# Patient Record
Sex: MALE | Race: WHITE | NOT HISPANIC OR LATINO | Employment: OTHER | URBAN - METROPOLITAN AREA
[De-identification: names, ages, dates, MRNs, and addresses within clinical notes are randomized per-mention and may not be internally consistent; named-entity substitution may affect disease eponyms.]

---

## 2023-02-15 ENCOUNTER — OFFICE VISIT (OUTPATIENT)
Dept: MEDICAL GROUP | Facility: MEDICAL CENTER | Age: 73
End: 2023-02-15
Payer: MEDICARE

## 2023-02-15 VITALS
HEART RATE: 94 BPM | WEIGHT: 179.8 LBS | TEMPERATURE: 98 F | OXYGEN SATURATION: 95 % | DIASTOLIC BLOOD PRESSURE: 82 MMHG | BODY MASS INDEX: 28.22 KG/M2 | HEIGHT: 67 IN | SYSTOLIC BLOOD PRESSURE: 136 MMHG

## 2023-02-15 DIAGNOSIS — M54.50 ACUTE BILATERAL LOW BACK PAIN WITHOUT SCIATICA: Primary | ICD-10-CM

## 2023-02-15 DIAGNOSIS — N40.0 BPH WITH ELEVATED PSA: ICD-10-CM

## 2023-02-15 DIAGNOSIS — Z02.89 ENCOUNTER FOR COMPLETION OF FORM WITH PATIENT: ICD-10-CM

## 2023-02-15 DIAGNOSIS — H93.13 TINNITUS, BILATERAL: ICD-10-CM

## 2023-02-15 DIAGNOSIS — R97.20 BPH WITH ELEVATED PSA: ICD-10-CM

## 2023-02-15 PROCEDURE — 99204 OFFICE O/P NEW MOD 45 MIN: CPT | Performed by: FAMILY MEDICINE

## 2023-02-15 RX ORDER — TIZANIDINE 4 MG/1
4 TABLET ORAL EVERY 6 HOURS PRN
Qty: 30 TABLET | Refills: 1 | Status: SHIPPED | OUTPATIENT
Start: 2023-02-15 | End: 2023-08-23

## 2023-02-15 ASSESSMENT — PATIENT HEALTH QUESTIONNAIRE - PHQ9: CLINICAL INTERPRETATION OF PHQ2 SCORE: 0

## 2023-02-15 NOTE — PROGRESS NOTES
"Subjective:     CC:  The primary encounter diagnosis was Acute bilateral low back pain without sciatica. Diagnoses of BPH with elevated PSA, Tinnitus, bilateral, and Encounter for completion of form with patient were also pertinent to this visit.    HISTORY OF THE PRESENT ILLNESS: Patient is a 73 y.o. male. This pleasant patient is here today to establish care and discuss back pain. Patient presents with daughter who helps with some of the medical history.    Moved here from [facility], [place], [facility]   Moved closer to family to [place]    Back pain  Bad fall 2 weeks ago, landed flat on back  Bad pain  Fell out of bed, had an air mattress on his bed, he was waiting for a new mattress  No head contact, no LOC  Middle of low back hurts, needing to use cane which is not typical for him  No radiation down legs  No loss of bladder or stool control  Using rest and heat    BPH with elevated PSA  Has had work up with urology and has always been benign  Does not take any medication for prostate, uses Zinc plus  Urinates once per night  Able to empty bladder  No issues with starting the stream  No hematuria    Tinnitus, B/L  Does wear hearing aids  Hasn't seen audiologist in some time  Would consider getting an updated test      Problem   Bph With Elevated Psa   Tinnitus, Bilateral       Current Outpatient Medications Ordered in Epic   Medication Sig Dispense Refill    tizanidine (ZANAFLEX) 4 MG Tab Take 1 Tablet by mouth every 6 hours as needed (low back pain). 30 Tablet 1     No current Epic-ordered facility-administered medications on file.       Health Maintenance: records request pending    ROS:   ROS see HPI      Objective:     Exam: /82   Pulse 94   Temp 36.7 °C (98 °F) (Temporal)   Ht 1.702 m (5' 7\")   Wt 81.6 kg (179 lb 12.8 oz)   SpO2 95%  Body mass index is 28.16 kg/m².    Physical Exam  Vitals reviewed.   Constitutional:       General: He is not in acute distress.     Appearance: " Normal appearance.   HENT:      Head: Normocephalic and atraumatic.   Eyes:      Conjunctiva/sclera: Conjunctivae normal.   Cardiovascular:      Rate and Rhythm: Normal rate and regular rhythm.      Heart sounds: Normal heart sounds.   Pulmonary:      Effort: Pulmonary effort is normal. No respiratory distress.      Breath sounds: Normal breath sounds.   Abdominal:      General: There is no distension.      Palpations: Abdomen is soft.      Tenderness: There is no abdominal tenderness. There is no guarding.   Musculoskeletal:         General: No swelling, tenderness, deformity or signs of injury.      Comments: Negative seated straight leg raise   Skin:     General: Skin is warm and dry.   Neurological:      Mental Status: He is alert. Mental status is at baseline.      Gait: Gait abnormal (uses cane).   Psychiatric:         Mood and Affect: Mood normal.         Behavior: Behavior normal.         Assessment & Plan:   73 y.o. male with the following -    Problem List Items Addressed This Visit       BPH with elevated PSA     Has been followed by urology closely  Records request pending  Will discuss referral to establish here if needed but not having any concerning symptoms at this time         Tinnitus, bilateral     Chronic and seems to have worsened  Patient is amenable to updated audiology visit         Relevant Orders    Referral to Audiology     Other Visit Diagnoses       Acute bilateral low back pain without sciatica    -  Primary  Acute problem  No red flag symptoms, no midline tenderness or step offs noted  Will continue with conservative therapy and will add tizanidine, discussed with patient can increase fall risk and drowsiness, if not improving over next couple of weeks to come back and will investigate further    Relevant Medications    tizanidine (ZANAFLEX) 4 MG Tab    Encounter for completion of form with patient      DMV form filled out for moderately temporary plaquered            Return if  symptoms worsen or fail to improve.    Please note that this dictation was created using voice recognition software. I have made every reasonable attempt to correct obvious errors, but I expect that there are errors of grammar and possibly content that I did not discover before finalizing the note.

## 2023-02-15 NOTE — LETTER
Jemstep  Armaan Koch D.O.  [address]  [place] [address]  Fax: [phone]   Authorization for Release/Disclosure of   Protected Health Information   Name: RAMESH BLANK : 1950 SSN: [id number]   Address: [place] [address] Phone:    [phone] (home)    I authorize the entity listed below to release/disclose the PHI below to:   Jemstep/Armaan Koch D.O. and Armaan Koch D.O.   Provider or Entity Name:  [facility]   Address   City, State, [place], [place] Phone:      Fax:     Reason for request: continuity of care   Information to be released:    [ xxx ] LAST COLONOSCOPY,  including any PATH REPORT and follow-up  [  ] LAST FIT/COLOGUARD RESULT [  ] LAST DEXA  [  ] LAST MAMMOGRAM  [  ] LAST PAP  [xxx  ] LAST LABS [  ] RETINA EXAM REPORT  [ xxx ] IMMUNIZATION RECORDS  [ xxx ] Release all info      [  ] Check here and initial the line next to each item to release ALL health information INCLUDING  _____ Care and treatment for drug and / or alcohol abuse  _____ HIV testing, infection status, or AIDS  _____ Genetic Testing    DATES OF SERVICE OR TIME PERIOD TO BE DISCLOSED: _____________  I understand and acknowledge that:  * This Authorization may be revoked at any time by you in writing, except if your health information has already been used or disclosed.  * Your health information that will be used or disclosed as a result of you signing this authorization could be re-disclosed by the recipient. If this occurs, your re-disclosed health information may no longer be protected by State or Federal laws.  * You may refuse to sign this Authorization. Your refusal will not affect your ability to obtain treatment.  * This Authorization becomes effective upon signing and will  on (date) __________.      If no date is indicated, this Authorization will  one (1) year from the signature date.    Name: Ramesh Blank    Signature:   Date:      2/15/2023       PLEASE FAX REQUESTED RECORDS BACK TO: [phone]

## 2023-02-16 NOTE — ASSESSMENT & PLAN NOTE
Has been followed by urology closely  Records request pending  Will discuss referral to establish here if needed but not having any concerning symptoms at this time

## 2023-06-23 ENCOUNTER — TELEPHONE (OUTPATIENT)
Dept: HEALTH INFORMATION MANAGEMENT | Facility: OTHER | Age: 73
End: 2023-06-23
Payer: MEDICARE

## 2023-08-22 ENCOUNTER — APPOINTMENT (OUTPATIENT)
Dept: RADIOLOGY | Facility: MEDICAL CENTER | Age: 73
DRG: 543 | End: 2023-08-22
Attending: STUDENT IN AN ORGANIZED HEALTH CARE EDUCATION/TRAINING PROGRAM
Payer: MEDICARE

## 2023-08-22 ENCOUNTER — HOSPITAL ENCOUNTER (INPATIENT)
Facility: MEDICAL CENTER | Age: 73
LOS: 3 days | DRG: 543 | End: 2023-08-26
Attending: STUDENT IN AN ORGANIZED HEALTH CARE EDUCATION/TRAINING PROGRAM | Admitting: SURGERY
Payer: MEDICARE

## 2023-08-22 DIAGNOSIS — Z02.9 DISCHARGE PLANNING ISSUES: ICD-10-CM

## 2023-08-22 DIAGNOSIS — S32.402A CLOSED NONDISPLACED FRACTURE OF LEFT ACETABULUM, UNSPECIFIED PORTION OF ACETABULUM, INITIAL ENCOUNTER (HCC): ICD-10-CM
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
## 2023-08-22 DIAGNOSIS — S22.32XA CLOSED FRACTURE OF ONE RIB OF LEFT SIDE, INITIAL ENCOUNTER: ICD-10-CM

## 2023-08-22 DIAGNOSIS — M54.50 ACUTE MIDLINE LOW BACK PAIN WITHOUT SCIATICA: ICD-10-CM

## 2023-08-22 DIAGNOSIS — S32.592A CLOSED FRACTURE OF LEFT INFERIOR PUBIC RAMUS, INITIAL ENCOUNTER (HCC): ICD-10-CM

## 2023-08-22 DIAGNOSIS — S32.82XA MULTIPLE CLOSED FRACTURES OF PELVIS WITHOUT DISRUPTION OF PELVIC RING, INITIAL ENCOUNTER (HCC): ICD-10-CM

## 2023-08-22 DIAGNOSIS — R31.9 HEMATURIA, UNSPECIFIED TYPE: ICD-10-CM

## 2023-08-22 DIAGNOSIS — I71.21 ANEURYSM OF ASCENDING AORTA WITHOUT RUPTURE (HCC): ICD-10-CM

## 2023-08-22 PROBLEM — T14.90XA TRAUMA: Status: ACTIVE | Noted: 2023-08-22

## 2023-08-22 PROBLEM — S32.030A COMPRESSION FRACTURE OF L3 VERTEBRA (HCC): Status: ACTIVE | Noted: 2023-08-22

## 2023-08-22 PROCEDURE — 99285 EMERGENCY DEPT VISIT HI MDM: CPT

## 2023-08-22 PROCEDURE — 73501 X-RAY EXAM HIP UNI 1 VIEW: CPT | Mod: LT

## 2023-08-22 PROCEDURE — 96375 TX/PRO/DX INJ NEW DRUG ADDON: CPT

## 2023-08-22 PROCEDURE — 96374 THER/PROPH/DIAG INJ IV PUSH: CPT

## 2023-08-22 PROCEDURE — 71101 X-RAY EXAM UNILAT RIBS/CHEST: CPT | Mod: LT

## 2023-08-22 PROCEDURE — 36415 COLL VENOUS BLD VENIPUNCTURE: CPT

## 2023-08-22 PROCEDURE — 99284 EMERGENCY DEPT VISIT MOD MDM: CPT | Performed by: SURGERY

## 2023-08-22 PROCEDURE — 72100 X-RAY EXAM L-S SPINE 2/3 VWS: CPT

## 2023-08-22 PROCEDURE — 96376 TX/PRO/DX INJ SAME DRUG ADON: CPT

## 2023-08-22 PROCEDURE — 700111 HCHG RX REV CODE 636 W/ 250 OVERRIDE (IP): Mod: JZ | Performed by: STUDENT IN AN ORGANIZED HEALTH CARE EDUCATION/TRAINING PROGRAM

## 2023-08-22 RX ORDER — HYDROMORPHONE HYDROCHLORIDE 1 MG/ML
0.5 INJECTION, SOLUTION INTRAMUSCULAR; INTRAVENOUS; SUBCUTANEOUS ONCE
Status: COMPLETED | OUTPATIENT
Start: 2023-08-22 | End: 2023-08-22

## 2023-08-22 RX ORDER — HYDROMORPHONE HYDROCHLORIDE 1 MG/ML
1 INJECTION, SOLUTION INTRAMUSCULAR; INTRAVENOUS; SUBCUTANEOUS ONCE
Status: COMPLETED | OUTPATIENT
Start: 2023-08-22 | End: 2023-08-22

## 2023-08-22 RX ADMIN — FAMOTIDINE 20 MG: 10 INJECTION, SOLUTION INTRAVENOUS at 20:56

## 2023-08-22 RX ADMIN — HYDROMORPHONE HYDROCHLORIDE 0.5 MG: 1 INJECTION, SOLUTION INTRAMUSCULAR; INTRAVENOUS; SUBCUTANEOUS at 23:29

## 2023-08-22 RX ADMIN — HYDROMORPHONE HYDROCHLORIDE 1 MG: 1 INJECTION, SOLUTION INTRAMUSCULAR; INTRAVENOUS; SUBCUTANEOUS at 20:56

## 2023-08-22 ASSESSMENT — PAIN DESCRIPTION - PAIN TYPE: TYPE: ACUTE PAIN

## 2023-08-23 ENCOUNTER — APPOINTMENT (OUTPATIENT)
Dept: RADIOLOGY | Facility: MEDICAL CENTER | Age: 73
DRG: 543 | End: 2023-08-23
Attending: SURGERY
Payer: MEDICARE

## 2023-08-23 PROBLEM — Z78.9 NO CONTRAINDICATION TO DEEP VEIN THROMBOSIS (DVT) PROPHYLAXIS: Status: ACTIVE | Noted: 2023-08-23

## 2023-08-23 PROBLEM — I71.21 ASCENDING AORTIC ANEURYSM (HCC): Status: ACTIVE | Noted: 2023-08-23

## 2023-08-23 PROBLEM — J98.11 BILATERAL ATELECTASIS: Status: ACTIVE | Noted: 2023-08-23

## 2023-08-23 PROBLEM — S32.82XA MULTIPLE CLOSED PELVIC FRACTURES WITHOUT DISRUPTION OF PELVIC CIRCLE (HCC): Status: ACTIVE | Noted: 2023-08-22

## 2023-08-23 LAB
ALBUMIN SERPL BCP-MCNC: 4 G/DL (ref 3.2–4.9)
ALBUMIN/GLOB SERPL: 1.7 G/DL
ALP SERPL-CCNC: 63 U/L (ref 30–99)
ALT SERPL-CCNC: 18 U/L (ref 2–50)
ANION GAP SERPL CALC-SCNC: 12 MMOL/L (ref 7–16)
AST SERPL-CCNC: 22 U/L (ref 12–45)
BILIRUB SERPL-MCNC: 1.3 MG/DL (ref 0.1–1.5)
BUN SERPL-MCNC: 17 MG/DL (ref 8–22)
CALCIUM ALBUM COR SERPL-MCNC: 9 MG/DL (ref 8.5–10.5)
CALCIUM SERPL-MCNC: 9 MG/DL (ref 8.5–10.5)
CHLORIDE SERPL-SCNC: 105 MMOL/L (ref 96–112)
CO2 SERPL-SCNC: 24 MMOL/L (ref 20–33)
CREAT SERPL-MCNC: 1.08 MG/DL (ref 0.5–1.4)
ERYTHROCYTE [DISTWIDTH] IN BLOOD BY AUTOMATED COUNT: 41.3 FL (ref 35.9–50)
ETHANOL BLD-MCNC: <10.1 MG/DL
GFR SERPLBLD CREATININE-BSD FMLA CKD-EPI: 72 ML/MIN/1.73 M 2
GLOBULIN SER CALC-MCNC: 2.3 G/DL (ref 1.9–3.5)
GLUCOSE SERPL-MCNC: 156 MG/DL (ref 65–99)
HCT VFR BLD AUTO: 45.7 % (ref 42–52)
HGB BLD-MCNC: 15.2 G/DL (ref 14–18)
MCH RBC QN AUTO: 30.5 PG (ref 27–33)
MCHC RBC AUTO-ENTMCNC: 33.3 G/DL (ref 32.3–36.5)
MCV RBC AUTO: 91.8 FL (ref 81.4–97.8)
PLATELET # BLD AUTO: 196 K/UL (ref 164–446)
PMV BLD AUTO: 10.5 FL (ref 9–12.9)
POTASSIUM SERPL-SCNC: 4 MMOL/L (ref 3.6–5.5)
PROT SERPL-MCNC: 6.3 G/DL (ref 6–8.2)
RBC # BLD AUTO: 4.98 M/UL (ref 4.7–6.1)
SODIUM SERPL-SCNC: 141 MMOL/L (ref 135–145)
WBC # BLD AUTO: 14.6 K/UL (ref 4.8–10.8)

## 2023-08-23 PROCEDURE — 99222 1ST HOSP IP/OBS MODERATE 55: CPT | Performed by: ORTHOPAEDIC SURGERY

## 2023-08-23 PROCEDURE — 99232 SBSQ HOSP IP/OBS MODERATE 35: CPT | Performed by: REGISTERED NURSE

## 2023-08-23 PROCEDURE — 700111 HCHG RX REV CODE 636 W/ 250 OVERRIDE (IP): Performed by: NURSE PRACTITIONER

## 2023-08-23 PROCEDURE — 82077 ASSAY SPEC XCP UR&BREATH IA: CPT

## 2023-08-23 PROCEDURE — 85027 COMPLETE CBC AUTOMATED: CPT

## 2023-08-23 PROCEDURE — 80053 COMPREHEN METABOLIC PANEL: CPT

## 2023-08-23 PROCEDURE — 700105 HCHG RX REV CODE 258: Mod: JZ | Performed by: NURSE PRACTITIONER

## 2023-08-23 PROCEDURE — A9270 NON-COVERED ITEM OR SERVICE: HCPCS | Performed by: NURSE PRACTITIONER

## 2023-08-23 PROCEDURE — 770001 HCHG ROOM/CARE - MED/SURG/GYN PRIV*

## 2023-08-23 PROCEDURE — 700101 HCHG RX REV CODE 250: Performed by: NURSE PRACTITIONER

## 2023-08-23 PROCEDURE — 72131 CT LUMBAR SPINE W/O DYE: CPT

## 2023-08-23 PROCEDURE — 71250 CT THORAX DX C-: CPT

## 2023-08-23 PROCEDURE — 700102 HCHG RX REV CODE 250 W/ 637 OVERRIDE(OP): Performed by: NURSE PRACTITIONER

## 2023-08-23 PROCEDURE — 71045 X-RAY EXAM CHEST 1 VIEW: CPT

## 2023-08-23 RX ORDER — ONDANSETRON 2 MG/ML
4 INJECTION INTRAMUSCULAR; INTRAVENOUS EVERY 4 HOURS PRN
Status: DISCONTINUED | OUTPATIENT
Start: 2023-08-23 | End: 2023-08-26 | Stop reason: HOSPADM

## 2023-08-23 RX ORDER — SODIUM CHLORIDE, SODIUM LACTATE, POTASSIUM CHLORIDE, CALCIUM CHLORIDE 600; 310; 30; 20 MG/100ML; MG/100ML; MG/100ML; MG/100ML
INJECTION, SOLUTION INTRAVENOUS CONTINUOUS
Status: DISCONTINUED | OUTPATIENT
Start: 2023-08-23 | End: 2023-08-24

## 2023-08-23 RX ORDER — ACETAMINOPHEN 325 MG/1
650 TABLET ORAL EVERY 6 HOURS PRN
Status: DISCONTINUED | OUTPATIENT
Start: 2023-08-28 | End: 2023-08-26 | Stop reason: HOSPADM

## 2023-08-23 RX ORDER — ENOXAPARIN SODIUM 100 MG/ML
30 INJECTION SUBCUTANEOUS EVERY 12 HOURS
Status: DISCONTINUED | OUTPATIENT
Start: 2023-08-23 | End: 2023-08-26 | Stop reason: HOSPADM

## 2023-08-23 RX ORDER — AMOXICILLIN 250 MG
1 CAPSULE ORAL NIGHTLY
Status: DISCONTINUED | OUTPATIENT
Start: 2023-08-23 | End: 2023-08-26 | Stop reason: HOSPADM

## 2023-08-23 RX ORDER — OXYCODONE HYDROCHLORIDE 5 MG/1
5 TABLET ORAL
Status: DISCONTINUED | OUTPATIENT
Start: 2023-08-23 | End: 2023-08-26 | Stop reason: HOSPADM

## 2023-08-23 RX ORDER — MORPHINE SULFATE 4 MG/ML
4 INJECTION INTRAVENOUS
Status: DISCONTINUED | OUTPATIENT
Start: 2023-08-23 | End: 2023-08-26 | Stop reason: HOSPADM

## 2023-08-23 RX ORDER — OXYCODONE HYDROCHLORIDE 10 MG/1
10 TABLET ORAL
Status: DISCONTINUED | OUTPATIENT
Start: 2023-08-23 | End: 2023-08-26 | Stop reason: HOSPADM

## 2023-08-23 RX ORDER — LIDOCAINE 50 MG/G
1 PATCH TOPICAL EVERY 24 HOURS
Status: DISCONTINUED | OUTPATIENT
Start: 2023-08-23 | End: 2023-08-26 | Stop reason: HOSPADM

## 2023-08-23 RX ORDER — ENEMA 19; 7 G/133ML; G/133ML
1 ENEMA RECTAL
Status: DISCONTINUED | OUTPATIENT
Start: 2023-08-23 | End: 2023-08-26 | Stop reason: HOSPADM

## 2023-08-23 RX ORDER — ACETAMINOPHEN 325 MG/1
650 TABLET ORAL EVERY 6 HOURS
Status: DISCONTINUED | OUTPATIENT
Start: 2023-08-23 | End: 2023-08-26 | Stop reason: HOSPADM

## 2023-08-23 RX ORDER — POLYETHYLENE GLYCOL 3350 17 G/17G
1 POWDER, FOR SOLUTION ORAL 2 TIMES DAILY
Status: DISCONTINUED | OUTPATIENT
Start: 2023-08-23 | End: 2023-08-26 | Stop reason: HOSPADM

## 2023-08-23 RX ORDER — DOCUSATE SODIUM 100 MG/1
100 CAPSULE, LIQUID FILLED ORAL 2 TIMES DAILY
Status: DISCONTINUED | OUTPATIENT
Start: 2023-08-23 | End: 2023-08-26 | Stop reason: HOSPADM

## 2023-08-23 RX ORDER — ONDANSETRON 4 MG/1
4 TABLET, ORALLY DISINTEGRATING ORAL EVERY 4 HOURS PRN
Status: DISCONTINUED | OUTPATIENT
Start: 2023-08-23 | End: 2023-08-26 | Stop reason: HOSPADM

## 2023-08-23 RX ORDER — BISACODYL 10 MG
10 SUPPOSITORY, RECTAL RECTAL
Status: DISCONTINUED | OUTPATIENT
Start: 2023-08-23 | End: 2023-08-26 | Stop reason: HOSPADM

## 2023-08-23 RX ORDER — AMOXICILLIN 250 MG
1 CAPSULE ORAL
Status: DISCONTINUED | OUTPATIENT
Start: 2023-08-23 | End: 2023-08-26 | Stop reason: HOSPADM

## 2023-08-23 RX ADMIN — OXYCODONE HYDROCHLORIDE 5 MG: 5 TABLET ORAL at 09:14

## 2023-08-23 RX ADMIN — ENOXAPARIN SODIUM 30 MG: 100 INJECTION SUBCUTANEOUS at 18:10

## 2023-08-23 RX ADMIN — SENNOSIDES AND DOCUSATE SODIUM 1 TABLET: 50; 8.6 TABLET ORAL at 22:39

## 2023-08-23 RX ADMIN — ACETAMINOPHEN 650 MG: 325 TABLET, FILM COATED ORAL at 12:27

## 2023-08-23 RX ADMIN — OXYCODONE HYDROCHLORIDE 10 MG: 10 TABLET ORAL at 12:27

## 2023-08-23 RX ADMIN — DOCUSATE SODIUM 100 MG: 100 CAPSULE, LIQUID FILLED ORAL at 06:08

## 2023-08-23 RX ADMIN — SODIUM CHLORIDE, POTASSIUM CHLORIDE, SODIUM LACTATE AND CALCIUM CHLORIDE: 600; 310; 30; 20 INJECTION, SOLUTION INTRAVENOUS at 23:45

## 2023-08-23 RX ADMIN — ACETAMINOPHEN 650 MG: 325 TABLET, FILM COATED ORAL at 06:08

## 2023-08-23 RX ADMIN — OXYCODONE HYDROCHLORIDE 5 MG: 5 TABLET ORAL at 06:12

## 2023-08-23 RX ADMIN — ACETAMINOPHEN 650 MG: 325 TABLET, FILM COATED ORAL at 18:10

## 2023-08-23 RX ADMIN — SODIUM CHLORIDE, POTASSIUM CHLORIDE, SODIUM LACTATE AND CALCIUM CHLORIDE: 600; 310; 30; 20 INJECTION, SOLUTION INTRAVENOUS at 03:57

## 2023-08-23 RX ADMIN — LIDOCAINE PATCH 5% 1 PATCH: 700 PATCH TOPICAL at 06:08

## 2023-08-23 ASSESSMENT — COGNITIVE AND FUNCTIONAL STATUS - GENERAL
HELP NEEDED FOR BATHING: A LOT
DRESSING REGULAR UPPER BODY CLOTHING: A LOT
TURNING FROM BACK TO SIDE WHILE IN FLAT BAD: A LOT
MOVING FROM LYING ON BACK TO SITTING ON SIDE OF FLAT BED: A LOT
WALKING IN HOSPITAL ROOM: A LOT
TOILETING: A LOT
DAILY ACTIVITIY SCORE: 15
DRESSING REGULAR LOWER BODY CLOTHING: A LOT
MOBILITY SCORE: 12
CLIMB 3 TO 5 STEPS WITH RAILING: A LOT
MOVING TO AND FROM BED TO CHAIR: A LOT
SUGGESTED CMS G CODE MODIFIER DAILY ACTIVITY: CK
SUGGESTED CMS G CODE MODIFIER MOBILITY: CL
STANDING UP FROM CHAIR USING ARMS: A LOT
PERSONAL GROOMING: A LITTLE

## 2023-08-23 ASSESSMENT — LIFESTYLE VARIABLES
HOW MANY TIMES IN THE PAST YEAR HAVE YOU HAD 5 OR MORE DRINKS IN A DAY: 0
EVER HAD A DRINK FIRST THING IN THE MORNING TO STEADY YOUR NERVES TO GET RID OF A HANGOVER: NO
EVER FELT BAD OR GUILTY ABOUT YOUR DRINKING: NO
CONSUMPTION TOTAL: NEGATIVE
AVERAGE NUMBER OF DAYS PER WEEK YOU HAVE A DRINK CONTAINING ALCOHOL: 1
HAVE PEOPLE ANNOYED YOU BY CRITICIZING YOUR DRINKING: NO
ON A TYPICAL DAY WHEN YOU DRINK ALCOHOL HOW MANY DRINKS DO YOU HAVE: 1
TOTAL SCORE: 0
DOES PATIENT WANT TO STOP DRINKING: NO
HAVE YOU EVER FELT YOU SHOULD CUT DOWN ON YOUR DRINKING: NO
ALCOHOL_USE: YES

## 2023-08-23 ASSESSMENT — ENCOUNTER SYMPTOMS
EYES NEGATIVE: 1
MYALGIAS: 1
PSYCHIATRIC NEGATIVE: 1
CARDIOVASCULAR NEGATIVE: 1
GASTROINTESTINAL NEGATIVE: 1
RESPIRATORY NEGATIVE: 1
NEUROLOGICAL NEGATIVE: 1
CONSTITUTIONAL NEGATIVE: 1
FALLS: 1
FOCAL WEAKNESS: 0
BLURRED VISION: 0
DOUBLE VISION: 0
DIZZINESS: 0

## 2023-08-23 ASSESSMENT — PAIN DESCRIPTION - PAIN TYPE
TYPE: ACUTE PAIN

## 2023-08-23 ASSESSMENT — PATIENT HEALTH QUESTIONNAIRE - PHQ9
2. FEELING DOWN, DEPRESSED, IRRITABLE, OR HOPELESS: NOT AT ALL
1. LITTLE INTEREST OR PLEASURE IN DOING THINGS: NOT AT ALL
SUM OF ALL RESPONSES TO PHQ9 QUESTIONS 1 AND 2: 0

## 2023-08-23 ASSESSMENT — FIBROSIS 4 INDEX: FIB4 SCORE: 1.96

## 2023-08-23 ASSESSMENT — COPD QUESTIONNAIRES
COPD SCREENING SCORE: 4
DO YOU EVER COUGH UP ANY MUCUS OR PHLEGM?: NO/ONLY WITH OCCASIONAL COLDS OR INFECTIONS
DURING THE PAST 4 WEEKS HOW MUCH DID YOU FEEL SHORT OF BREATH: NONE/LITTLE OF THE TIME
HAVE YOU SMOKED AT LEAST 100 CIGARETTES IN YOUR ENTIRE LIFE: YES

## 2023-08-23 NOTE — H&P
TRAUMA HISTORY AND PHYSICAL    DATE OF SERVICE: 8/22/2023    ACTIVATION LEVEL: no activation.     HISTORY OF PRESENT ILLNESS: The patient is a 73 year-old man who was injured when he fell unintentionally. He did not hit his head or have any LOC.  He does not take any medication. He has pain in his pelvis and ribs.  He has road rash on the left forearm.     The patient was triaged to [facility] in accordance with established pre hospital protocols. An expeditious primary and secondary survey with required adjuncts was conducted. See Trauma Narrator for full details.    PAST MEDICAL HISTORY:   Past Medical History:   Diagnosis Date    BPH with elevated PSA     Tinnitus, bilateral          PAST SURGICAL HISTORY:   Past Surgical History:   Procedure Laterality Date    INGUINAL HERNIA REPAIR Left     PROSTATE NEEDLE BIOPSY      x4 reports all benign          ALLERGIES: Aspirin       CURRENT MEDICATIONS:   No outpatient medications have been marked as taking for the 8/22/23 encounter (Hospital Encounter).   Patient reports none    FAMILY HISTORY: family history includes Diabetes in his father; Heart Attack in his father; Other in his mother.  Reviewed and found to be non-contributory in regards to the above presentation    SOCIAL HISTORY:  reports that he quit smoking about 34 years ago. His smoking use included cigarettes. He started smoking about 44 years ago. He has a 10.0 pack-year smoking history. He has never used smokeless tobacco. He reports current alcohol use. He reports that he does not use drugs.  Patient denies habitual use of alcohol, tobacco, and illicit drugs    REVIEW OF SYSTEMS:   Comprehensive review of systems is negative with the exception of the aforementioned HPI, PMH, and PSH bullets in accordance with CMS guidelines.    PHYSICAL EXAMINATION:   Vital Signs: BP (!) 153/80   Pulse 79   Temp 36.9 °C (98.4 °F) (Temporal)   Resp 18   SpO2 91%   Physical Exam  Vitals and  nursing note reviewed. Exam conducted with a chaperone present.   Constitutional:       General: He is not in acute distress.     Appearance: He is not ill-appearing.   HENT:      Head: Normocephalic and atraumatic.      Right Ear: External ear normal.      Left Ear: External ear normal.      Nose: Nose normal.      Mouth/Throat:      Mouth: Mucous membranes are moist.      Pharynx: Oropharynx is clear.   Eyes:      Extraocular Movements: Extraocular movements intact.      Pupils: Pupils are equal, round, and reactive to light.   Cardiovascular:      Rate and Rhythm: Normal rate and regular rhythm.      Pulses: Normal pulses.   Pulmonary:      Effort: Pulmonary effort is normal.      Comments: Nasal cannula oxygen  Abdominal:      General: Abdomen is flat. There is no distension.      Palpations: Abdomen is soft.      Tenderness: There is no abdominal tenderness.   Musculoskeletal:         General: No swelling or deformity.      Cervical back: Normal range of motion.      Comments: Pelvic pain, poor motor function of left lower extremity   Skin:     General: Skin is warm and dry.      Capillary Refill: Capillary refill takes less than 2 seconds.      Comments: Left forearm abrasion.   Neurological:      General: No focal deficit present.      Mental Status: He is alert and oriented to person, place, and time.   Psychiatric:         Mood and Affect: Mood normal.         LABORATORY VALUES:   Recent Labs     08/23/23  0010   WBC 14.6*   RBC 4.98   HEMOGLOBIN 15.2   HEMATOCRIT 45.7   MCV 91.8   MCH 30.5   MCHC 33.3   RDW 41.3   PLATELETCT 196   MPV 10.5     Recent Labs     08/23/23  0010   SODIUM 141   POTASSIUM 4.0   CHLORIDE 105   CO2 24   GLUCOSE 156*   BUN 17   CREATININE 1.08   CALCIUM 9.0     Recent Labs     08/23/23  0010   ASTSGOT 22   ALTSGPT 18   TBILIRUBIN 1.3   ALKPHOSPHAT 63   GLOBULIN 2.3            IMAGING:   CT-LSPINE W/O PLUS RECONS   Final Result         1.  No acute traumatic bony injury of the lumbar  spine.   2.  Chronic appearing superior endplate fracture of L3 with anterior wedge compression deformity   3.  Medial left acetabular fracture   4.  Atherosclerosis      DX-LUMBAR SPINE-2 OR 3 VIEWS   Final Result         1.  Anterior compression form body of L3, appears likely chronic, otherwise age indeterminate. Could be further evaluated with CT of the lumbar spine.   2.  Atherosclerosis      [id number] (WITH 1-VIEW CXR) LEFT   Final Result         1.  Anterior left 10th rib fracture   2.  Scattered hazy pulmonary infiltrates and/or edema.   3.  Atherosclerosis      DX-HIP-UNILATERAL-WITH PELVIS-1 VIEW LEFT   Final Result         1.  Subtle left inferior pubic ramus fracture      CT-CHEST,ABDOMEN,PELVIS W/O    (Results Pending)       IMPRESSION AND PLAN:  Multiple closed pelvic fractures without disruption of pelvic [place] (HCC)- (present on admission)  Assessment & Plan  Left inferior pubic ramus fracture.  Medial left acetabular fracture.  Definitive plan pending.  Weight bearing status - Nonweightbearing LLE.  Romeo Bowman MD. Orthopedic Surgeon. [facility]. consultation pending     Closed fracture of one rib of left side- (present on admission)  Assessment & Plan  Anterior left 10th rib fracture.  CT of chest pending.  Aggressive multimodal pain management, and pulmonary hygiene. Serial chest radiographs.    Chronic compression fracture of L3 vertebra (HCC)- (present on admission)  Assessment & Plan  Anterior compression form body of L3, appears likely chronic.  CT of lumbar spine confirms L3 fracture is chronic. No acute fracture of lumbar spine.  Analgesia.    Trauma- (present on admission)  Assessment & Plan  Frequent falls. Tripped and fell today. Unable to ambulate since fall.  Non Trauma Activation.  Susy Gee MD. Trauma Surgery.        Aggregated care time spent evaluating, reviewing documentation, providing care, and managing this patient exclusive of procedures: 55  minutes  ____________________________________   [facility]     DD: 8/22/2023   DT: 11:38 PM'

## 2023-08-23 NOTE — ED TRIAGE NOTES
Chief Complaint   Patient presents with    Back Pain     Hip pain     Patient BIBA with above mentioned complaint. Patient called EMS after having ground level fall. Patient reporting of lower back pain and hip pian. Denies head strike and not on blood thinner.

## 2023-08-23 NOTE — ASSESSMENT & PLAN NOTE
Left inferior pubic ramus fracture.  Medial left acetabular fracture.  Non-operative management.  Weight bearing status - Touch toe weightbearing LLE x 4 weeks.   Romeo Bowman MD. Orthopedic Surgeon. [facility].

## 2023-08-23 NOTE — ED NOTES
Bedside report given to Barbara RN. Patient on 2L O2 nasal cannula.  POC discussed with patient. No needs at this time.

## 2023-08-23 NOTE — ASSESSMENT & PLAN NOTE
Anterior compression form body of L3, appears likely chronic.  CT of lumbar spine confirms L3 fracture is chronic. No acute fracture of lumbar spine.  Analgesia.

## 2023-08-23 NOTE — CARE PLAN
Problem: Pain - Standard  Goal: Alleviation of pain or a reduction in pain to the patient’s comfort goal  Outcome: Progressing     Problem: Knowledge Deficit - Standard  Goal: Patient and family/care givers will demonstrate understanding of plan of care, disease process/condition, diagnostic tests and medications  Outcome: Progressing       The patient is Stable - Low risk of patient condition declining or worsening    Shift Goals  Clinical Goals: Pain control  Patient Goals: Pain control    Progress made toward(s) clinical / shift goals:  Taught pt 0-10 pain scale and non-pharmacological method of pain management, encouraged to verbalize when in pain. Administered PRN pain meds as needed.      Patient is not progressing towards the following goals:

## 2023-08-23 NOTE — PROGRESS NOTES
Ground-level fall after a mechanical trip and fall.  Landed on the left hip.  Case discussed with Dr. Mendiola last night.  CT scan shows acetabular fracture involving anterior column and medial acetabular wall.  This is nondisplaced.  Severe osteopenia to the pelvis.    Plan:  Toe-touch weightbearing and range of motion as tolerated to the left hip x4 weeks  PT/OT  Mobilize  DC planning

## 2023-08-23 NOTE — DISCHARGE PLANNING
Received Choice form @: 1210  Agency/Facility Name: [facility]  Referral sent per Choice form @: Not sent, placed in media    Received Choice form @: 1241  Agency/Facility Name: [facility]  Referral sent per Choice form @: Not sent, placed in media    Received Choice form @: 1246  Agency/Facility Name: [facility]   Referral sent per Choice form @: 1475

## 2023-08-23 NOTE — DISCHARGE PLANNING
HTH/SCP TCN chart review completed. Collaborated with ED CM as well as floor CM Leeann (as pt transitioned from ED to T3). The most current review of medical record, knowledge of pt's PLOF and social support, LACE+ score of 45, 6 clicks scores of 12 mobility were considered.      Pt seen at bedside. Introduced TCN program. Provided education regarding post acute levels of care. Discussed HTH/SCP plan benefits (Meds to Beds, medical uber and [facility] transitional care). Pt verbalizes understanding.    Note that pt is now awaiting PT and OT consults that were placed today (8/23). Also note that per review, pt is TTWB LLE and currently non-op. Discussed dc planning considerations with this in mind with pt in agreement that he may require post acute placement at time of dc. He reports he does not have a FWW or appropriate ADs at home as well.    Given aforementioned, no additional provider requests and choice proactively obtained for IRF, SNF, HH and DME (02 and/or DME if indicated), faxed to [facility] (currently in media) and CM aware. TCN will continue to follow and collaborate with discharge planning team as additional post acute needs arise. Thank you.     Completed today:  PT/OT consults in place today; monitoring for recs  Choice obtained: IRF, SNF, HH, DME (02/ADs)  [facility] referral not sent

## 2023-08-23 NOTE — PROGRESS NOTES
Trauma / Surgical Daily Progress Note    Date of Service  8/23/2023    Chief Complaint  73 y.o. male admitted 8/22/2023 with pelvic and rib fractures after a mechanical ground level fall.     Interval Events  Patient seen in ED, awaiting merrill bed.  Pain well managed.  Patient denies home prescription medication.   No new injury on tertiary exam.  PT/OT.  Ortho consult complete, non operative, regular diet.     Review of Systems  Review of Systems   Constitutional: Negative.    Eyes: Negative.  Negative for blurred vision and double vision.   Respiratory: Negative.     Cardiovascular: Negative.    Gastrointestinal: Negative.    Genitourinary: Negative.    Musculoskeletal:  Positive for falls, joint pain and myalgias.   Neurological: Negative.  Negative for dizziness and focal weakness.   Psychiatric/Behavioral: Negative.     All other systems reviewed and are negative.       Vital Signs  Temp:  [36.6 °C (97.9 °F)-36.9 °C (98.4 °F)] 36.9 °C (98.4 °F)  Pulse:  [70-99] 77  Resp:  [15-18] 16  BP: (132-153)/(76-89) 146/76  SpO2:  [91 %-94 %] 91 %    Physical Exam  Physical Exam  Vitals and nursing note reviewed.   Constitutional:       General: He is not in acute distress.     Appearance: Normal appearance.      Interventions: Nasal cannula in place.   HENT:      Head: Normocephalic and atraumatic.      Nose: Nose normal.      Mouth/Throat:      Mouth: Mucous membranes are moist.      Pharynx: Oropharynx is clear.   Eyes:      Extraocular Movements: Extraocular movements intact.      Right eye: No nystagmus.      Left eye: No nystagmus.      Conjunctiva/sclera: Conjunctivae normal.      Pupils: Pupils are equal, round, and reactive to light.   Cardiovascular:      Rate and Rhythm: Normal rate and regular rhythm.      Pulses: Normal pulses.      Heart sounds: No murmur heard.  Pulmonary:      Effort: Pulmonary effort is normal. No respiratory distress.   Chest:      Chest wall: No deformity, tenderness or crepitus.    Abdominal:      General: Abdomen is flat. Bowel sounds are normal.      Palpations: Abdomen is soft.   Musculoskeletal:         General: Normal range of motion.      Cervical back: Full passive range of motion without pain, normal range of motion and neck supple.      Right lower leg: No edema.      Left lower leg: No edema.   Skin:     General: Skin is warm and dry.      Capillary Refill: Capillary refill takes less than 2 seconds.      Comments: Abrasion to anterior left forearm.    Neurological:      General: No focal deficit present.      Mental Status: He is alert and oriented to person, place, and time. Mental status is at baseline.   Psychiatric:         Mood and Affect: Mood normal.         Laboratory  Recent Results (from the past 24 hour(s))   CBC WITHOUT DIFFERENTIAL    Collection Time: 08/23/23 12:10 AM   Result Value Ref Range    WBC 14.6 (H) 4.8 - 10.8 K/uL    RBC 4.98 4.70 - 6.10 M/uL    Hemoglobin 15.2 14.0 - 18.0 g/dL    Hematocrit 45.7 42.0 - 52.0 %    MCV 91.8 81.4 - 97.8 fL    MCH 30.5 27.0 - 33.0 pg    MCHC 33.3 32.3 - 36.5 g/dL    RDW 41.3 35.9 - 50.0 fL    Platelet Count 196 164 - 446 K/uL    MPV 10.5 9.0 - 12.9 fL   COMP METABOLIC PANEL    Collection Time: 08/23/23 12:10 AM   Result Value Ref Range    Sodium 141 135 - 145 mmol/L    Potassium 4.0 3.6 - 5.5 mmol/L    Chloride 105 96 - 112 mmol/L    Co2 24 20 - 33 mmol/L    Anion Gap 12.0 7.0 - 16.0    Glucose 156 (H) 65 - 99 mg/dL    Bun 17 8 - 22 mg/dL    Creatinine 1.08 0.50 - 1.40 mg/dL    Calcium 9.0 8.5 - 10.5 mg/dL    Correct Calcium 9.0 8.5 - 10.5 mg/dL    AST(SGOT) 22 12 - 45 U/L    ALT(SGPT) 18 2 - 50 U/L    Alkaline Phosphatase 63 30 - 99 U/L    Total Bilirubin 1.3 0.1 - 1.5 mg/dL    Albumin 4.0 3.2 - 4.9 g/dL    Total Protein 6.3 6.0 - 8.2 g/dL    Globulin 2.3 1.9 - 3.5 g/dL    A-G Ratio 1.7 g/dL   DIAGNOSTIC ALCOHOL    Collection Time: 08/23/23 12:10 AM   Result Value Ref Range    Diagnostic Alcohol <10.1 <10.1 mg/dL   ESTIMATED  GFR    Collection Time: 08/23/23 12:10 AM   Result Value Ref Range    GFR (CKD-EPI) 72 >60 mL/min/1.73 m 2       Fluids    Intake/Output Summary (Last 24 hours) at 8/23/2023 1011  Last data filed at 8/23/2023 0600  Gross per 24 hour   Intake --   Output 500 ml   Net -500 ml       Core Measures & Quality Metrics  Radiology images reviewed, Labs reviewed and Medications reviewed  Jang catheter: No Jang      DVT Prophylaxis: Enoxaparin (Lovenox)  DVT prophylaxis - mechanical: SCDs      Assessed for rehab: Patient was assess for and/or received rehabilitation services during this hospitalization    RAP Score Total: 4    CAGE Results: not completed Blood Alcohol>0.08: no       Assessment/Plan  * Trauma- (present on admission)  Assessment & Plan  Frequent falls. Tripped and fell today. Unable to ambulate since fall.  Non Trauma Activation.  Susy Gee MD. Trauma Surgery.    Multiple closed pelvic fractures without disruption of pelvic [place] (HCC)- (present on admission)  Assessment & Plan  Left inferior pubic ramus fracture.  Medial left acetabular fracture.  Non-operative management.  Weight bearing status - Touch toe weightbearing LLE x 4 weeks.   Romeo Bowman MD. Orthopedic Surgeon. [facility].     Closed fracture of one rib of left side- (present on admission)  Assessment & Plan  Anterior left 10th rib fracture.  CT of chest completed, no pneumothorax, confirms left anterolateral 10th rib fracture.   Aggressive multimodal pain management, and pulmonary hygiene. Serial chest radiographs.    Bilateral atelectasis- (present on admission)  Assessment & Plan  Bibasilar atelectasis, possible component of lower lobe infiltrates  Aggressive pulmonary hygiene. Serial chest radiographs.    No contraindication to deep vein thrombosis (DVT) prophylaxis- (present on admission)  Assessment & Plan  Prophylactic dose enoxaparin 30 mg BID initiated upon admission.    Chronic compression fracture of L3 vertebra  (HCC)- (present on admission)  Assessment & Plan  Anterior compression form body of L3, appears likely chronic.  CT of lumbar spine confirms L3 fracture is chronic. No acute fracture of lumbar spine.  Analgesia.    Ascending aortic aneurysm (HCC)- (present on admission)  Assessment & Plan  Incidental finding.  4.5 cm ascending thoracic aortic aneurysm, radiographic follow-up and stability recommended as clinically appropriate.      Mental status adequate for full examination?: Yes    Spine cleared (radiologically and/or clinically): Yes    All current laboratory studies/radiology exams reviewed: Yes    Medications reconciliation has been reviewed: Yes    Completed Consultations:  Surgery orthopedic.     Pending Consultations:  None    Newly identified diagnoses, injuries and/or co-morbidities:  None.      Discussed patient condition with RN, Patient, and trauma surgery.

## 2023-08-23 NOTE — ED NOTES
Received bedside report from Becka ADAME. Assumed pt care. Pt on 2L of oxygen. Pt denies any pain needs at this time.

## 2023-08-23 NOTE — ASSESSMENT & PLAN NOTE
Anterior left 10th rib fracture.  CT of chest completed, no pneumothorax, confirms left anterolateral 10th rib fracture.   Aggressive multimodal pain management, and pulmonary hygiene. Serial chest radiographs.

## 2023-08-23 NOTE — ASSESSMENT & PLAN NOTE
Bibasilar atelectasis, possible component of lower lobe infiltrates  Aggressive pulmonary hygiene. Serial chest radiographs.

## 2023-08-23 NOTE — ED NOTES
Report to Buddy ADAME. Pt being transported up to [facility] with transport in stable condition. All belongings with pt. Pt being sent up on 2L oxygen with full tank

## 2023-08-23 NOTE — ASSESSMENT & PLAN NOTE
Incidental finding.  4.5 cm ascending thoracic aortic aneurysm, radiographic follow-up and stability recommended as clinically appropriate.

## 2023-08-23 NOTE — ED PROVIDER NOTES
ED Provider Note    CHIEF COMPLAINT  Chief Complaint   Patient presents with    Back Pain     Hip pain       EXTERNAL RECORDS REVIEWED  Outpatient Notes Seen as an outpatient in 2023 for low back pain without sciatica. He had had a fall two weeks prior and landed flat on his back    HPI/ROS  LIMITATION TO HISTORY   Select: : None  OUTSIDE HISTORIAN(S):  None    Ramesh Manjarrez is a 73 y.o. male who presents for evaluation of hip pain and back pain after a ground-level fall.  Approximately 3 hours prior to arrival he accidentally tripped on some rocks outside of his apartment.  He had immediate pain in the left hip and low back and was unable to get up.  He has not been able to ambulate since the fall.  He also has some left-sided chest wall pain.  He did not hit his head she did not lose consciousness and denies any nausea or vomiting.  He does not take any anticoagulation.  He did receive fentanyl in route by EMS with some improvement.  The pain is worse with movement.    PAST MEDICAL HISTORY   has a past medical history of BPH with elevated PSA and Tinnitus, bilateral.    SURGICAL HISTORY   has a past surgical history that includes prostate needle biopsy and inguinal hernia repair (Left).    FAMILY HISTORY  Family History   Problem Relation Age of Onset    Other Mother         Liver disease    Diabetes Father     Heart Attack Father         77       SOCIAL HISTORY  Social History     Tobacco Use    Smoking status: Former     Current packs/day: 0.00     Average packs/day: 1 pack/day for 10.0 years (10.0 ttl pk-yrs)     Types: Cigarettes     Start date:      Quit date:      Years since quittin.6    Smokeless tobacco: Never   Vaping Use    Vaping Use: Never used   Substance and Sexual Activity    Alcohol use: Yes     Comment: 1 drink a month    Drug use: Never    Sexual activity: Not Currently       CURRENT MEDICATIONS  Home Medications       Reviewed by Seth Carr (Pharmacy Tech)  "on 08/23/23 at 0156  Med List Status: Complete     Medication Last Dose Status        Patient Cyrus Taking any Medications                           ALLERGIES  Allergies   Allergen Reactions    Aspirin Unspecified     Worsening tinnitus       PHYSICAL EXAM  VITAL SIGNS: BP (!) 142/78   Pulse 99   Temp 36.9 °C (98.4 °F) (Temporal)   Resp 18   Ht 1.702 m (5' 7.01\")   Wt 82 kg (180 lb 12.4 oz)   SpO2 94%   BMI 28.31 kg/m²    Constitutional: Awake and alert. Nontoxic  HENT: Normal cephalic atraumatic  Eyes: Grossly normal  Neck: Normal range of motion  Cardiovascular: Normal heart rate   Thorax & Lungs: No respiratory distress. Left chest wall TTP. No crepitus.    Abdomen: Non-tender  Back: Patient declines exam due to pain  Skin:  Road rash to left forearm  Extremities: TTP of left hip and pain with internal and external rotation.  Left hip raise is limited due to pain.  5/5 strength with ankle plantar and dorsiflexion.  + DP and PT pulses.  Cap refill < 2 seconds.  Normal sensation to light touch in all nerve distributions.  No open wounds.  Compartments soft.   Psychiatric: Affect normal      DIAGNOSTIC STUDIES / PROCEDURES    LABS  Results for orders placed or performed during the hospital encounter of 08/22/23   CBC WITHOUT DIFFERENTIAL   Result Value Ref Range    WBC 14.6 (H) 4.8 - 10.8 K/uL    RBC 4.98 4.70 - 6.10 M/uL    Hemoglobin 15.2 14.0 - 18.0 g/dL    Hematocrit 45.7 42.0 - 52.0 %    MCV 91.8 81.4 - 97.8 fL    MCH 30.5 27.0 - 33.0 pg    MCHC 33.3 32.3 - 36.5 g/dL    RDW 41.3 35.9 - 50.0 fL    Platelet Count 196 164 - 446 K/uL    MPV 10.5 9.0 - 12.9 fL   COMP METABOLIC PANEL   Result Value Ref Range    Sodium 141 135 - 145 mmol/L    Potassium 4.0 3.6 - 5.5 mmol/L    Chloride 105 96 - 112 mmol/L    Co2 24 20 - 33 mmol/L    Anion Gap 12.0 7.0 - 16.0    Glucose 156 (H) 65 - 99 mg/dL    Bun 17 8 - 22 mg/dL    Creatinine 1.08 0.50 - 1.40 mg/dL    Calcium 9.0 8.5 - 10.5 mg/dL    Correct Calcium 9.0 8.5 - " 10.5 mg/dL    AST(SGOT) 22 12 - 45 U/L    ALT(SGPT) 18 2 - 50 U/L    Alkaline Phosphatase 63 30 - 99 U/L    Total Bilirubin 1.3 0.1 - 1.5 mg/dL    Albumin 4.0 3.2 - 4.9 g/dL    Total Protein 6.3 6.0 - 8.2 g/dL    Globulin 2.3 1.9 - 3.5 g/dL    A-G Ratio 1.7 g/dL   DIAGNOSTIC ALCOHOL   Result Value Ref Range    Diagnostic Alcohol <10.1 <10.1 mg/dL   ESTIMATED GFR   Result Value Ref Range    GFR (CKD-EPI) 72 >60 mL/min/1.73 m 2         RADIOLOGY  I have independently interpreted the diagnostic imaging associated with this visit and am waiting the final reading from the radiologist.   My preliminary interpretation is as follows: + rib fracture  Radiologist interpretation:   CT-LSPINE W/O PLUS RECONS   Final Result         1.  No acute traumatic bony injury of the lumbar spine.   2.  Chronic appearing superior endplate fracture of L3 with anterior wedge compression deformity   3.  Medial left acetabular fracture   4.  Atherosclerosis      CT-CHEST,ABDOMEN,PELVIS W/O   Final Result         1.  Left inferior pubic ramus fracture   2.  Left medial acetabular fracture extending into the acetabular roof   3.  Anterolateral left 10th rib fracture   4.  Bibasilar atelectasis, possible component of lower lobe infiltrates   5.  4.5 cm ascending thoracic aortic aneurysm, radiographic follow-up and stability recommended as clinically appropriate.   6.  Cholelithiasis   7.  Fat-containing bilateral inguinal hernias.   8.  Enlarged prostate, workup and evaluation for causes of prostate enlargement recommended as clinically appropriate.   9.  Atherosclerosis and atherosclerotic coronary artery disease      DX-LUMBAR SPINE-2 OR 3 VIEWS   Final Result         1.  Anterior compression form body of L3, appears likely chronic, otherwise age indeterminate. Could be further evaluated with CT of the lumbar spine.   2.  Atherosclerosis      [id number] (WITH 1-VIEW CXR) LEFT   Final Result         1.  Anterior left 10th rib fracture    2.  Scattered hazy pulmonary infiltrates and/or edema.   3.  Atherosclerosis      DX-HIP-UNILATERAL-WITH PELVIS-1 VIEW LEFT   Final Result         1.  Subtle left inferior pubic ramus fracture      DX-CHEST-PORTABLE (1 VIEW)    (Results Pending)         COURSE & MEDICAL DECISION MAKING    ED Observation Status? Yes; I am placing the patient in to an observation status due to a diagnostic uncertainty as well as therapeutic intensity. Patient placed in observation status at 8:35 PM, 8/22/2023.     Observation plan is as follows: XR    Upon Reevaluation, the patient's condition has: not improved; and will be escalated to hospitalization.    Patient discharged from ED Observation status at 11:38 PM 8/22/2023    INITIAL ASSESSMENT, COURSE AND PLAN  Care Narrative: This is a 73-year-old male, not on anticoagulation who presents for evaluation after a mechanical ground-level fall.  He is complaining of pain in his left hip, lower back and left chest wall.  On arrival he has normal vital signs and is neurologically intact.  He has a leukocytosis likely reactive in the setting of stress response.  Otherwise no significant laboratory derangements.  X-rays obtained notable for left 10th rib fracture, left inferior pubic ramus fracture and a likely chronic L3 compression fracture.  Patient denies any known history of this and therefore we will pursue cross-sectional imaging for further evaluation.    He did not hit his head or lose consciousness and I have very low suspicion for an intracranial bleed.  He has no neck pain to suggest fracture or subluxation.  His abdomen is soft, nontender and I doubt hollow viscus injury or solid organ injury.  Patient describes a fall that is clearly mechanical in nature and I have low suspicion for syncope, CVA or underlying infection.  CT was obtained which is notable for chronic anterior compression fracture of L3.  He does also have left medial acetabular fracture and left inferior pubic  ramus fracture.  He was incidentally noted to have a 4.5 cm ascending thoracic aortic aneurysm among other incidental findings.  Otherwise no traumatic injuries identified.    In the ER patient required multiple doses of IV narcotics for pain control.    3:21 AM  I discussed with Dr. Bowman (Orthopedics).  His hip fractures are non operative.      Patient will be admitted to the Trauma service by Dr. Gee for further management.  He was admitted in stable condition.             DISPOSITION AND DISCUSSIONS  I have discussed management of the patient with the following physicians and RICKY's:  Dr. Gee. Dr. Bowman    Discussion of management with other [facility] or appropriate source(s): None       FINAL DIAGNOSIS  1. Closed nondisplaced fracture of left acetabulum, unspecified portion of acetabulum, initial encounter ([facility]) Acute   2. Closed fracture of left inferior pubic ramus, initial encounter ([facility]) Acute   3. Acute midline low back pain without sciatica Acute   4. Closed fracture of one rib of left side, initial encounter Acute   5. Aneurysm of ascending aorta without rupture (HCC) Acute   6. Ground-level fall Acute          Electronically signed by: Felicia Mendiola M.D., 8/22/2023 8:17 PM

## 2023-08-23 NOTE — ASSESSMENT & PLAN NOTE
Frequent falls. Tripped and fell today. Unable to ambulate since fall.  Non Trauma Activation.  Susy Gee MD. Trauma Surgery.

## 2023-08-24 ENCOUNTER — APPOINTMENT (OUTPATIENT)
Dept: RADIOLOGY | Facility: MEDICAL CENTER | Age: 73
DRG: 543 | End: 2023-08-24
Attending: SURGERY
Payer: MEDICARE

## 2023-08-24 LAB
ANION GAP SERPL CALC-SCNC: 9 MMOL/L (ref 7–16)
BASOPHILS # BLD AUTO: 0.4 % (ref 0–1.8)
BASOPHILS # BLD: 0.05 K/UL (ref 0–0.12)
BUN SERPL-MCNC: 15 MG/DL (ref 8–22)
CALCIUM SERPL-MCNC: 8.8 MG/DL (ref 8.5–10.5)
CHLORIDE SERPL-SCNC: 103 MMOL/L (ref 96–112)
CO2 SERPL-SCNC: 28 MMOL/L (ref 20–33)
CREAT SERPL-MCNC: 1.17 MG/DL (ref 0.5–1.4)
EOSINOPHIL # BLD AUTO: 0.25 K/UL (ref 0–0.51)
EOSINOPHIL NFR BLD: 2.1 % (ref 0–6.9)
ERYTHROCYTE [DISTWIDTH] IN BLOOD BY AUTOMATED COUNT: 43.4 FL (ref 35.9–50)
GFR SERPLBLD CREATININE-BSD FMLA CKD-EPI: 65 ML/MIN/1.73 M 2
GLUCOSE SERPL-MCNC: 117 MG/DL (ref 65–99)
HCT VFR BLD AUTO: 43 % (ref 42–52)
HGB BLD-MCNC: 14.6 G/DL (ref 14–18)
IMM GRANULOCYTES # BLD AUTO: 0.07 K/UL (ref 0–0.11)
IMM GRANULOCYTES NFR BLD AUTO: 0.6 % (ref 0–0.9)
LYMPHOCYTES # BLD AUTO: 0.84 K/UL (ref 1–4.8)
LYMPHOCYTES NFR BLD: 7 % (ref 22–41)
MAGNESIUM SERPL-MCNC: 1.8 MG/DL (ref 1.5–2.5)
MCH RBC QN AUTO: 31.5 PG (ref 27–33)
MCHC RBC AUTO-ENTMCNC: 34 G/DL (ref 32.3–36.5)
MCV RBC AUTO: 92.9 FL (ref 81.4–97.8)
MONOCYTES # BLD AUTO: 0.92 K/UL (ref 0–0.85)
MONOCYTES NFR BLD AUTO: 7.6 % (ref 0–13.4)
NEUTROPHILS # BLD AUTO: 9.95 K/UL (ref 1.82–7.42)
NEUTROPHILS NFR BLD: 82.3 % (ref 44–72)
NRBC # BLD AUTO: 0 K/UL
NRBC BLD-RTO: 0 /100 WBC (ref 0–0.2)
PHOSPHATE SERPL-MCNC: 2.2 MG/DL (ref 2.5–4.5)
PLATELET # BLD AUTO: 151 K/UL (ref 164–446)
PMV BLD AUTO: 10.5 FL (ref 9–12.9)
POTASSIUM SERPL-SCNC: 4.5 MMOL/L (ref 3.6–5.5)
RBC # BLD AUTO: 4.63 M/UL (ref 4.7–6.1)
SODIUM SERPL-SCNC: 140 MMOL/L (ref 135–145)
WBC # BLD AUTO: 12.1 K/UL (ref 4.8–10.8)

## 2023-08-24 PROCEDURE — 700111 HCHG RX REV CODE 636 W/ 250 OVERRIDE (IP): Performed by: REGISTERED NURSE

## 2023-08-24 PROCEDURE — 700111 HCHG RX REV CODE 636 W/ 250 OVERRIDE (IP): Performed by: NURSE PRACTITIONER

## 2023-08-24 PROCEDURE — 83735 ASSAY OF MAGNESIUM: CPT

## 2023-08-24 PROCEDURE — 99233 SBSQ HOSP IP/OBS HIGH 50: CPT | Performed by: REGISTERED NURSE

## 2023-08-24 PROCEDURE — 97162 PT EVAL MOD COMPLEX 30 MIN: CPT

## 2023-08-24 PROCEDURE — A9270 NON-COVERED ITEM OR SERVICE: HCPCS | Performed by: REGISTERED NURSE

## 2023-08-24 PROCEDURE — 94669 MECHANICAL CHEST WALL OSCILL: CPT

## 2023-08-24 PROCEDURE — 700101 HCHG RX REV CODE 250: Performed by: NURSE PRACTITIONER

## 2023-08-24 PROCEDURE — 36415 COLL VENOUS BLD VENIPUNCTURE: CPT

## 2023-08-24 PROCEDURE — 700102 HCHG RX REV CODE 250 W/ 637 OVERRIDE(OP): Performed by: REGISTERED NURSE

## 2023-08-24 PROCEDURE — 71045 X-RAY EXAM CHEST 1 VIEW: CPT

## 2023-08-24 PROCEDURE — 700102 HCHG RX REV CODE 250 W/ 637 OVERRIDE(OP): Performed by: NURSE PRACTITIONER

## 2023-08-24 PROCEDURE — 770001 HCHG ROOM/CARE - MED/SURG/GYN PRIV*

## 2023-08-24 PROCEDURE — 84100 ASSAY OF PHOSPHORUS: CPT

## 2023-08-24 PROCEDURE — A9270 NON-COVERED ITEM OR SERVICE: HCPCS | Performed by: NURSE PRACTITIONER

## 2023-08-24 PROCEDURE — 85025 COMPLETE CBC W/AUTO DIFF WBC: CPT

## 2023-08-24 PROCEDURE — 97166 OT EVAL MOD COMPLEX 45 MIN: CPT

## 2023-08-24 PROCEDURE — 80048 BASIC METABOLIC PNL TOTAL CA: CPT

## 2023-08-24 RX ORDER — CELECOXIB 100 MG/1
100 CAPSULE ORAL 2 TIMES DAILY
Status: DISCONTINUED | OUTPATIENT
Start: 2023-08-24 | End: 2023-08-26 | Stop reason: HOSPADM

## 2023-08-24 RX ORDER — MAGNESIUM SULFATE 1 G/100ML
1 INJECTION INTRAVENOUS ONCE
Status: COMPLETED | OUTPATIENT
Start: 2023-08-24 | End: 2023-08-24

## 2023-08-24 RX ORDER — METAXALONE 800 MG/1
800 TABLET ORAL 3 TIMES DAILY
Status: DISCONTINUED | OUTPATIENT
Start: 2023-08-24 | End: 2023-08-26 | Stop reason: HOSPADM

## 2023-08-24 RX ADMIN — DIBASIC SODIUM PHOSPHATE, MONOBASIC POTASSIUM PHOSPHATE AND MONOBASIC SODIUM PHOSPHATE 250 MG: 852; 155; 130 TABLET ORAL at 17:00

## 2023-08-24 RX ADMIN — OXYCODONE HYDROCHLORIDE 5 MG: 5 TABLET ORAL at 09:32

## 2023-08-24 RX ADMIN — POLYETHYLENE GLYCOL 3350 1 PACKET: 17 POWDER, FOR SOLUTION ORAL at 16:59

## 2023-08-24 RX ADMIN — DOCUSATE SODIUM 100 MG: 100 CAPSULE, LIQUID FILLED ORAL at 05:12

## 2023-08-24 RX ADMIN — LIDOCAINE PATCH 5% 1 PATCH: 700 PATCH TOPICAL at 05:12

## 2023-08-24 RX ADMIN — ACETAMINOPHEN 650 MG: 325 TABLET, FILM COATED ORAL at 16:59

## 2023-08-24 RX ADMIN — DIBASIC SODIUM PHOSPHATE, MONOBASIC POTASSIUM PHOSPHATE AND MONOBASIC SODIUM PHOSPHATE 250 MG: 852; 155; 130 TABLET ORAL at 09:23

## 2023-08-24 RX ADMIN — ENOXAPARIN SODIUM 30 MG: 100 INJECTION SUBCUTANEOUS at 17:00

## 2023-08-24 RX ADMIN — ACETAMINOPHEN 650 MG: 325 TABLET, FILM COATED ORAL at 12:51

## 2023-08-24 RX ADMIN — ACETAMINOPHEN 650 MG: 325 TABLET, FILM COATED ORAL at 23:42

## 2023-08-24 RX ADMIN — METAXALONE 800 MG: 800 TABLET ORAL at 17:00

## 2023-08-24 RX ADMIN — MAGNESIUM SULFATE IN DEXTROSE 1 G: 10 INJECTION, SOLUTION INTRAVENOUS at 09:26

## 2023-08-24 RX ADMIN — ENOXAPARIN SODIUM 30 MG: 100 INJECTION SUBCUTANEOUS at 05:11

## 2023-08-24 RX ADMIN — CELECOXIB 100 MG: 100 CAPSULE ORAL at 17:00

## 2023-08-24 RX ADMIN — DOCUSATE SODIUM 100 MG: 100 CAPSULE, LIQUID FILLED ORAL at 16:59

## 2023-08-24 RX ADMIN — CELECOXIB 100 MG: 100 CAPSULE ORAL at 12:50

## 2023-08-24 RX ADMIN — METAXALONE 800 MG: 800 TABLET ORAL at 12:50

## 2023-08-24 RX ADMIN — ACETAMINOPHEN 650 MG: 325 TABLET, FILM COATED ORAL at 05:11

## 2023-08-24 RX ADMIN — DIBASIC SODIUM PHOSPHATE, MONOBASIC POTASSIUM PHOSPHATE AND MONOBASIC SODIUM PHOSPHATE 250 MG: 852; 155; 130 TABLET ORAL at 12:51

## 2023-08-24 ASSESSMENT — ENCOUNTER SYMPTOMS
EYES NEGATIVE: 1
DOUBLE VISION: 0
BLURRED VISION: 0
MYALGIAS: 1
FALLS: 1
RESPIRATORY NEGATIVE: 1
CARDIOVASCULAR NEGATIVE: 1
DIZZINESS: 0
FOCAL WEAKNESS: 0
PSYCHIATRIC NEGATIVE: 1
GASTROINTESTINAL NEGATIVE: 1
CONSTITUTIONAL NEGATIVE: 1
NEUROLOGICAL NEGATIVE: 1

## 2023-08-24 ASSESSMENT — PAIN DESCRIPTION - PAIN TYPE
TYPE: ACUTE PAIN

## 2023-08-24 ASSESSMENT — COGNITIVE AND FUNCTIONAL STATUS - GENERAL
DRESSING REGULAR UPPER BODY CLOTHING: A LITTLE
EATING MEALS: A LITTLE
SUGGESTED CMS G CODE MODIFIER MOBILITY: CL
HELP NEEDED FOR BATHING: A LOT
MOVING FROM LYING ON BACK TO SITTING ON SIDE OF FLAT BED: A LOT
PERSONAL GROOMING: A LITTLE
TOILETING: A LOT
TURNING FROM BACK TO SIDE WHILE IN FLAT BAD: UNABLE
CLIMB 3 TO 5 STEPS WITH RAILING: TOTAL
WALKING IN HOSPITAL ROOM: A LOT
SUGGESTED CMS G CODE MODIFIER DAILY ACTIVITY: CK
DAILY ACTIVITIY SCORE: 15
MOVING TO AND FROM BED TO CHAIR: UNABLE
STANDING UP FROM CHAIR USING ARMS: A LITTLE
MOBILITY SCORE: 10
DRESSING REGULAR LOWER BODY CLOTHING: A LOT

## 2023-08-24 ASSESSMENT — PATIENT HEALTH QUESTIONNAIRE - PHQ9
SUM OF ALL RESPONSES TO PHQ9 QUESTIONS 1 AND 2: 0
1. LITTLE INTEREST OR PLEASURE IN DOING THINGS: NOT AT ALL
2. FEELING DOWN, DEPRESSED, IRRITABLE, OR HOPELESS: NOT AT ALL

## 2023-08-24 ASSESSMENT — GAIT ASSESSMENTS: GAIT LEVEL OF ASSIST: UNABLE TO PARTICIPATE

## 2023-08-24 ASSESSMENT — ACTIVITIES OF DAILY LIVING (ADL): TOILETING: INDEPENDENT

## 2023-08-24 NOTE — THERAPY
Physical Therapy   Initial Evaluation     Patient Name: Ramesh Manjarrez  Age:  73 y.o., Sex:  male  Medical Record #: [id number]  Today's Date: 8/24/2023     Precautions: Fall Risk;Toe Touch Weight Bearing Left Lower Extremity  Comments: ROMAT LLE x4 wks    Assessment  Patient is 73 y.o. male presently acutely s/p GLF resulting in L pelvic and rib fxs. Pt instructed on TTWB precautions. He required max A of 2 for bed mob due to pelvic and rib pain. Fair understandng of WB precautions to perform for transfer. Unable to progress to amb today 2/2 pain in ribs. Currently recommend post-acute placement prior to DC home.    Plan    Physical Therapy Initial Treatment Plan   Treatment Plan : Bed Mobility, Equipment, Gait Training, Neuro Re-Education / Balance, Self Care / Home Evaluation, Therapeutic Activities, Therapeutic Exercise, Stair Training  Treatment Frequency: 5 Times per Week  Duration: Until Therapy Goals Met    DC Equipment Recommendations: Unable to determine at this time  Discharge Recommendations: Recommend post-acute placement for additional physical therapy services prior to discharge home     Objective    Prior Living Situation   Prior Services Home-Independent   Housing / Facility 2 Story House  (bedroom on 1st level)   Steps Into Home 2   Equipment Owned Single Point Cane   Lives with - Patient's Self Care Capacity Adult Children   Comments son and son's fiance whom work during the day   Prior Level of Functional Mobility   Bed Mobility Independent   Transfer Status Independent   Ambulation Independent   Ambulation Distance Community distances   Assistive Devices Used Single Point Cane   Stairs Independent   History of Falls   History of Falls Yes   Date of Last Fall   (reports hx of recent falls)   Cognition    Cognition / Consciousness X   Level of Consciousness Alert   New Learning Impaired   Attention Impaired   Sequencing Impaired   Comments fair ability to understand and maintain TTWB precautions  on LLE   Active ROM Upper Body   Active ROM Upper Body  X   Comments RUE flexion/abd grossly impaired by weakness   Active ROM Lower Body    Active ROM Lower Body  X   Comments LLE grossly impaired by weakness/pain   Strength Lower Body   Lower Body Strength  X   Comments LLE grossly 2/5   Sensation Lower Body   Lower Extremity Sensation   WDL   Comments denies N/T   Balance Assessment   Sitting Balance (Static) Fair   Sitting Balance (Dynamic) Fair   Standing Balance (Static) Fair -   Standing Balance (Dynamic) Fair -   Weight Shift Sitting Fair   Weight Shift Standing Poor   Comments w/ FWW for transfer   Bed Mobility    Supine to Sit Maximal Assist  (of 2, toward R side of bed)   Sit to Supine   (up to chair post)   Rolling Maximal Assist to Rt.   Gait Analysis   Gait Level Of Assist Unable to Participate   Weight Bearing Status LLE TTWB   Functional Mobility   Sit to Stand Contact Guard Assist   Bed, Chair, Wheelchair Transfer Minimal Assist   Transfer Method Stand Pivot   How much difficulty does the patient currently have...   Turning over in bed (including adjusting bedclothes, sheets and blankets)? 1   Sitting down on and standing up from a chair with arms (e.g., wheelchair, bedside commode, etc.) 2   Moving from lying on back to sitting on the side of the bed? 1   How much help from another person does the patient currently need...   Moving to and from a bed to a chair (including a wheelchair)? 3   Need to walk in a hospital room? 2   Climbing 3-5 steps with a railing? 1   6 clicks Mobility Score 10   Activity Tolerance   Sitting in Chair post session   Sitting Edge of Bed 15 mins   Standing 3 mins   Short Term Goals    Short Term Goal # 1 Pt will perform supine<>sit with HOB flat and min A within 6 visits.   Short Term Goal # 2 Pt will perform bed<>chair transfers with FWW and SPV within 6 visits.   Short Term Goal # 3 Pt will amb >10ft with FWW and min A within 6 visits.

## 2023-08-24 NOTE — DISCHARGE PLANNING
HTH/SCP TCN chart review completed. Current discharge considerations are anticipated dc to post acute placement given  current TTWB status, current assist levels per review, and recent fall, etc. Note that pt has been accepted to both [facility] if indicated at time of dc. See prior TCN note for initial dc planning considerations. PT and OT consults are in place and TCN will monitor for recs as well. TCN will continue to follow and collaborate with discharge planning team as additional post acute needs arise. Thank you.    Completed:  PT/OT consults in place; monitoring for recs*  Choice obtained: IRF, SNF, HH, DME (02/ADs)  [facility] referral not sent     *noted in PM that PT and OT recs placed today for post acute placement

## 2023-08-24 NOTE — PROGRESS NOTES
Trauma / Surgical Daily Progress Note    Date of Service  8/24/2023    Chief Complaint  73 y.o. male admitted 8/22/2023 with pelvic and rib fractures after a mechanical ground level fall.     Interval Events  Patient doing well.   Multimodal pain regimen adjusted.  PT/OT evaluations pending.  Phosphorus replaced.   Encouraged IS use.     Review of Systems  Review of Systems   Constitutional: Negative.    Eyes: Negative.  Negative for blurred vision and double vision.   Respiratory: Negative.     Cardiovascular: Negative.    Gastrointestinal: Negative.    Genitourinary: Negative.    Musculoskeletal:  Positive for falls, joint pain and myalgias.   Neurological: Negative.  Negative for dizziness and focal weakness.   Psychiatric/Behavioral: Negative.     All other systems reviewed and are negative.       Vital Signs  Temp:  [36.9 °C (98.4 °F)-37.4 °C (99.3 °F)] 36.9 °C (98.4 °F)  Pulse:  [] 72  Resp:  [16-18] 18  BP: (130-159)/() 138/82  SpO2:  [92 %-93 %] 92 %    Physical Exam  Physical Exam  Vitals and nursing note reviewed.   Constitutional:       General: He is not in acute distress.     Appearance: Normal appearance.      Interventions: Nasal cannula in place.   HENT:      Head: Normocephalic and atraumatic.      Nose: Nose normal.      Mouth/Throat:      Mouth: Mucous membranes are moist.      Pharynx: Oropharynx is clear.   Eyes:      Extraocular Movements: Extraocular movements intact.      Right eye: No nystagmus.      Left eye: No nystagmus.      Conjunctiva/sclera: Conjunctivae normal.      Pupils: Pupils are equal, round, and reactive to light.   Cardiovascular:      Rate and Rhythm: Normal rate.      Pulses: Normal pulses.      Heart sounds: No murmur heard.  Pulmonary:      Effort: Pulmonary effort is normal. No respiratory distress.   Chest:      Chest wall: No deformity, tenderness or crepitus.   Abdominal:      General: Abdomen is flat. Bowel sounds are normal.      Palpations: Abdomen  is soft.   Musculoskeletal:         General: Normal range of motion.      Cervical back: Full passive range of motion without pain, normal range of motion and neck supple.      Right lower leg: No edema.      Left lower leg: No edema.   Skin:     General: Skin is warm and dry.      Capillary Refill: Capillary refill takes less than 2 seconds.      Comments: Abrasion to anterior left forearm.    Neurological:      General: No focal deficit present.      Mental Status: He is alert and oriented to person, place, and time. Mental status is at baseline.      GCS: GCS eye subscore is 4. GCS verbal subscore is 5. GCS motor subscore is 6.      Sensory: Sensation is intact.      Motor: Motor function is intact.   Psychiatric:         Mood and Affect: Mood normal.         Laboratory  Recent Results (from the past 24 hour(s))   CBC with Differential: Tomorrow AM    Collection Time: 08/24/23  4:19 AM   Result Value Ref Range    WBC 12.1 (H) 4.8 - 10.8 K/uL    RBC 4.63 (L) 4.70 - 6.10 M/uL    Hemoglobin 14.6 14.0 - 18.0 g/dL    Hematocrit 43.0 42.0 - 52.0 %    MCV 92.9 81.4 - 97.8 fL    MCH 31.5 27.0 - 33.0 pg    MCHC 34.0 32.3 - 36.5 g/dL    RDW 43.4 35.9 - 50.0 fL    Platelet Count 151 (L) 164 - 446 K/uL    MPV 10.5 9.0 - 12.9 fL    Neutrophils-Polys 82.30 (H) 44.00 - 72.00 %    Lymphocytes 7.00 (L) 22.00 - 41.00 %    Monocytes 7.60 0.00 - 13.40 %    Eosinophils 2.10 0.00 - 6.90 %    Basophils 0.40 0.00 - 1.80 %    Immature Granulocytes 0.60 0.00 - 0.90 %    Nucleated RBC 0.00 0.00 - 0.20 /100 WBC    Neutrophils (Absolute) 9.95 (H) 1.82 - 7.42 K/uL    Lymphs (Absolute) 0.84 (L) 1.00 - 4.80 K/uL    Monos (Absolute) 0.92 (H) 0.00 - 0.85 K/uL    Eos (Absolute) 0.25 0.00 - 0.51 K/uL    Baso (Absolute) 0.05 0.00 - 0.12 K/uL    Immature Granulocytes (abs) 0.07 0.00 - 0.11 K/uL    NRBC (Absolute) 0.00 K/uL   Basic Metabolic Panel (BMP): Tomorrow AM    Collection Time: 08/24/23  4:19 AM   Result Value Ref Range    Sodium 140 135 - 145  mmol/L    Potassium 4.5 3.6 - 5.5 mmol/L    Chloride 103 96 - 112 mmol/L    Co2 28 20 - 33 mmol/L    Glucose 117 (H) 65 - 99 mg/dL    Bun 15 8 - 22 mg/dL    Creatinine 1.17 0.50 - 1.40 mg/dL    Calcium 8.8 8.5 - 10.5 mg/dL    Anion Gap 9.0 7.0 - 16.0   Magnesium: Every Monday and Thursday AM    Collection Time: 08/24/23  4:19 AM   Result Value Ref Range    Magnesium 1.8 1.5 - 2.5 mg/dL   Phosphorus: Every Monday and Thursday AM    Collection Time: 08/24/23  4:19 AM   Result Value Ref Range    Phosphorus 2.2 (L) 2.5 - 4.5 mg/dL   ESTIMATED GFR    Collection Time: 08/24/23  4:19 AM   Result Value Ref Range    GFR (CKD-EPI) 65 >60 mL/min/1.73 m 2       Fluids    Intake/Output Summary (Last 24 hours) at 8/24/2023 1002  Last data filed at 8/23/2023 1600  Gross per 24 hour   Intake --   Output 150 ml   Net -150 ml       Core Measures & Quality Metrics  Radiology images reviewed, Labs reviewed and Medications reviewed  Jang catheter: No Jang      DVT Prophylaxis: Enoxaparin (Lovenox)  DVT prophylaxis - mechanical: SCDs      Assessed for rehab: Patient was assess for and/or received rehabilitation services during this hospitalization    RAP Score Total: 4    CAGE Results: negative Blood Alcohol>0.08: no       Assessment/Plan  * Trauma- (present on admission)  Assessment & Plan  Frequent falls. Tripped and fell today. Unable to ambulate since fall.  Non Trauma Activation.  Susy Gee MD. Trauma Surgery.    Multiple closed pelvic fractures without disruption of pelvic [place] (HCC)- (present on admission)  Assessment & Plan  Left inferior pubic ramus fracture.  Medial left acetabular fracture.  Non-operative management.  Weight bearing status - Touch toe weightbearing LLE x 4 weeks.   Romeo Bowman MD. Orthopedic Surgeon. [facility].     Closed fracture of one rib of left side- (present on admission)  Assessment & Plan  Anterior left 10th rib fracture.  CT of chest completed, no pneumothorax, confirms  left anterolateral 10th rib fracture.   Aggressive multimodal pain management, and pulmonary hygiene. Serial chest radiographs.    Bilateral atelectasis- (present on admission)  Assessment & Plan  Bibasilar atelectasis, possible component of lower lobe infiltrates  Aggressive pulmonary hygiene. Serial chest radiographs.    No contraindication to deep vein thrombosis (DVT) prophylaxis- (present on admission)  Assessment & Plan  Prophylactic dose enoxaparin 30 mg BID initiated upon admission.    Chronic compression fracture of L3 vertebra (HCC)- (present on admission)  Assessment & Plan  Anterior compression form body of L3, appears likely chronic.  CT of lumbar spine confirms L3 fracture is chronic. No acute fracture of lumbar spine.  Analgesia.    Ascending aortic aneurysm (HCC)- (present on admission)  Assessment & Plan  Incidental finding.  4.5 cm ascending thoracic aortic aneurysm, radiographic follow-up and stability recommended as clinically appropriate.        Discussed patient condition with RN, Patient, and trauma surgery.

## 2023-08-24 NOTE — PROGRESS NOTES
4 Eyes Skin Assessment Completed by DEEP Bee and DEEP Hill.    Head WDL  Ears WDL  Nose WDL  Mouth WDL  Neck WDL  Breast/Chest WDL  Shoulder Blades WDL  Spine WDL  (R) Arm/Elbow/Hand WDL  (L) Arm/Elbow/Hand Abrasion  Abdomen WDL  Groin Redness  Scrotum/Coccyx/Buttocks Redness and Blanching  (R) Leg WDL  (L) Leg WDL  (R) Heel/Foot/Toe WDL  (L) Heel/Foot/Toe WDL          Devices In Places Blood Pressure Cuff, Pulse Ox, SCD's, Oxy Mask, and Nasal Cannula      Interventions In Place Gray Ear Foams, Pillows, and Low Air Loss Mattress    Possible Skin Injury No    Pictures Uploaded Into Epic N/A  Wound Consult Placed N/A  RN Wound Prevention Protocol Ordered No

## 2023-08-24 NOTE — PROGRESS NOTES
1121 Report received from Teena ADAME.    1200 Pt arrived to unit from ER. Pt AAOx4, VSS on 2.5L nasal canula. Assessment completed. Pt oriented to room and to call light. Bed locked and in lowest position. Bed alarm on. Discussed plan of care. All needs met at this time.

## 2023-08-24 NOTE — THERAPY
"Occupational Therapy   Initial Evaluation     Patient Name: Ramesh Manjarrez  Age:  73 y.o., Sex:  male  Medical Record #: [id number]  Today's Date: 8/24/2023     Precautions  Precautions: Fall Risk, Toe Touch Weight Bearing Left Lower Extremity  Comments: ROMAT LLE x4 wks    Assessment  Patient is 73 y.o. male admitted after GLF with L acetabular fx and L rib fractures, managed non op.  Additional factors influencing patient status / progress: weakness, fatigue, impaired balance, impaired cognition, pain.      Plan    Occupational Therapy Initial Treatment Plan   Treatment Interventions: Self Care / Activities of Daily Living, Adaptive Equipment, Therapeutic Exercises, Neuro Re-Education / Balance, Therapeutic Activity  Treatment Frequency: 4 Times per Week  Duration: Until Therapy Goals Met    DC Equipment Recommendations: Unable to determine at this time  Discharge Recommendations: Recommend post-acute placement for additional occupational therapy services prior to discharge home     Subjective    \"This is hard.\"     Objective       08/24/23 1003   Prior Living Situation   Prior Services Home-Independent   Housing / Facility 2 Story House  (bed/bath downstairs)   Bathroom Set up Walk In Shower;Shower Chair;Grab Bars   Equipment Owned Hand Held Shower;Tub / Shower Seat;Grab Bar(s) In Tub / Shower;Grab Bar(s) By Toilet;Single Point Cane   Lives with - Patient's Self Care Capacity Adult Children   Comments Lives with son and son's fiance. They both work.   Prior Level of ADL Function   Self Feeding Independent   Grooming / Hygiene Independent   Bathing Independent   Dressing Independent   Toileting Independent   Prior Level of IADL Function   Medication Management Independent   Laundry Independent   Kitchen Mobility Independent   Finances Independent   Home Management Independent   Shopping Independent   Prior Level Of Mobility Independent With Device in Community;Independent With Device in Home   Driving / " Transportation Driving Independent   Occupation (Pre-Hospital Vocational) Retired Due To Age   Precautions   Precautions Fall Risk;Toe Touch Weight Bearing Left Lower Extremity   Comments ROMAT LLE x4 weeks; L rib fractures   Pain 0 - 10 Group   Therapist Pain Assessment Nurse Notified;During Activity  (mod c/o L hip pain)   Cognition    Cognition / Consciousness X   Speech/ Communication Delayed Responses   Level of Consciousness Alert   Safety Awareness Impaired   New Learning Impaired   Attention Impaired   Sequencing Impaired   Initiation Impaired   Comments pleasant and cooperative, poor retention of education   Active ROM Upper Body   Active ROM Upper Body  X   Dominant Hand Right   Comments hx of R RTC injury, impaired R shoulder flexion, limited LUE movement due to referred pain from rib fractures   Strength Upper Body   Upper Body Strength  X   Gross Strength Generalized Weakness, Equal Bilaterally.    Balance Assessment   Sitting Balance (Static) Fair   Sitting Balance (Dynamic) Fair -   Standing Balance (Static) Poor +   Standing Balance (Dynamic) Poor -   Weight Shift Sitting Fair   Weight Shift Standing Poor   Comments w/ FWW; difficulty maintaining TTWB LLE during transfer, needed frequent cueing   Bed Mobility    Supine to Sit Maximal Assist   Scooting Maximal Assist   Rolling Maximal Assist to Rt.   ADL Assessment   Grooming Supervision;Seated  (oral care)   Upper Body Dressing Minimal Assist  (don gown)   Lower Body Dressing Maximal Assist   Toileting   (declined need)   How much help from another person does the patient currently need...   Putting on and taking off regular lower body clothing? 2   Bathing (including washing, rinsing, and drying)? 2   Toileting, which includes using a toilet, bedpan, or urinal? 2   Putting on and taking off regular upper body clothing? 3   Taking care of personal grooming such as brushing teeth? 3   Eating meals? 3   6 Clicks Daily Activity Score 15   Functional  Mobility   Sit to Stand Contact Guard Assist   Bed, Chair, Wheelchair Transfer Minimal Assist   Transfer Method Stand Step   Mobility EOB>STS>Stand step to recliner   Comments w/ FWW   Patient / Family Goals   Patient / Family Goal #1 go home   Short Term Goals   Short Term Goal # 1 pt will demo ADL txfs with supv while maintaining TTWB LLE   Short Term Goal # 2 pt will dress LB with supv and AE prn   Short Term Goal # 3 pt will demo toileting wth supv

## 2023-08-24 NOTE — CARE PLAN
The patient is Stable - Low risk of patient condition declining or worsening    Shift Goals  Clinical Goals: mobility; wean oxygen  Patient Goals: comfort; rest  Family Goals: not present    Progress made toward(s) clinical / shift goals:    Problem: Pain - Standard  Goal: Alleviation of pain or a reduction in pain to the patient’s comfort goal  Outcome: Progressing     Problem: Knowledge Deficit - Standard  Goal: Patient and family/care givers will demonstrate understanding of plan of care, disease process/condition, diagnostic tests and medications  Outcome: Progressing     Problem: Fall Risk  Goal: Patient will remain free from falls  Outcome: Progressing       Patient is not progressing towards the following goals:

## 2023-08-24 NOTE — CARE PLAN
Problem: Pain - Standard  Goal: Alleviation of pain or a reduction in pain to the patient’s comfort goal  Outcome: Progressing  Pain control interventions: repositioning, medication, distraction and rest.          The patient is Stable - Low risk of patient condition declining or worsening    Shift Goals  Clinical Goals: rest, safety, pain control  Patient Goals: pain control, rest  Family Goals: na    Progress made toward(s) clinical / shift goals:  progressing     Patient is not progressing towards the following goals:

## 2023-08-25 PROBLEM — Z75.8 DISCHARGE PLANNING ISSUES: Status: ACTIVE | Noted: 2023-08-25

## 2023-08-25 PROBLEM — Z02.9 DISCHARGE PLANNING ISSUES: Status: ACTIVE | Noted: 2023-08-25

## 2023-08-25 LAB
ANION GAP SERPL CALC-SCNC: 7 MMOL/L (ref 7–16)
APPEARANCE UR: ABNORMAL
BACTERIA #/AREA URNS HPF: NEGATIVE /HPF
BASOPHILS # BLD AUTO: 0.4 % (ref 0–1.8)
BASOPHILS # BLD: 0.04 K/UL (ref 0–0.12)
BILIRUB UR QL STRIP.AUTO: NEGATIVE
BUN SERPL-MCNC: 20 MG/DL (ref 8–22)
CALCIUM SERPL-MCNC: 8.5 MG/DL (ref 8.5–10.5)
CHLORIDE SERPL-SCNC: 106 MMOL/L (ref 96–112)
CO2 SERPL-SCNC: 28 MMOL/L (ref 20–33)
COLOR UR: YELLOW
CREAT SERPL-MCNC: 1.05 MG/DL (ref 0.5–1.4)
EOSINOPHIL # BLD AUTO: 0.3 K/UL (ref 0–0.51)
EOSINOPHIL NFR BLD: 3 % (ref 0–6.9)
EPI CELLS #/AREA URNS HPF: NEGATIVE /HPF
ERYTHROCYTE [DISTWIDTH] IN BLOOD BY AUTOMATED COUNT: 42.1 FL (ref 35.9–50)
GFR SERPLBLD CREATININE-BSD FMLA CKD-EPI: 74 ML/MIN/1.73 M 2
GLUCOSE SERPL-MCNC: 121 MG/DL (ref 65–99)
GLUCOSE UR STRIP.AUTO-MCNC: NEGATIVE MG/DL
HCT VFR BLD AUTO: 40.4 % (ref 42–52)
HGB BLD-MCNC: 13.6 G/DL (ref 14–18)
HYALINE CASTS #/AREA URNS LPF: ABNORMAL /LPF
IMM GRANULOCYTES # BLD AUTO: 0.05 K/UL (ref 0–0.11)
IMM GRANULOCYTES NFR BLD AUTO: 0.5 % (ref 0–0.9)
KETONES UR STRIP.AUTO-MCNC: ABNORMAL MG/DL
LEUKOCYTE ESTERASE UR QL STRIP.AUTO: ABNORMAL
LYMPHOCYTES # BLD AUTO: 0.79 K/UL (ref 1–4.8)
LYMPHOCYTES NFR BLD: 7.8 % (ref 22–41)
MCH RBC QN AUTO: 30.6 PG (ref 27–33)
MCHC RBC AUTO-ENTMCNC: 33.7 G/DL (ref 32.3–36.5)
MCV RBC AUTO: 91 FL (ref 81.4–97.8)
MICRO URNS: ABNORMAL
MONOCYTES # BLD AUTO: 1.18 K/UL (ref 0–0.85)
MONOCYTES NFR BLD AUTO: 11.6 % (ref 0–13.4)
NEUTROPHILS # BLD AUTO: 7.77 K/UL (ref 1.82–7.42)
NEUTROPHILS NFR BLD: 76.7 % (ref 44–72)
NITRITE UR QL STRIP.AUTO: NEGATIVE
NRBC # BLD AUTO: 0 K/UL
NRBC BLD-RTO: 0 /100 WBC (ref 0–0.2)
PH UR STRIP.AUTO: 7 [PH] (ref 5–8)
PLATELET # BLD AUTO: 141 K/UL (ref 164–446)
PMV BLD AUTO: 10.7 FL (ref 9–12.9)
POTASSIUM SERPL-SCNC: 3.6 MMOL/L (ref 3.6–5.5)
PROT UR QL STRIP: NEGATIVE MG/DL
RBC # BLD AUTO: 4.44 M/UL (ref 4.7–6.1)
RBC # URNS HPF: >150 /HPF
RBC UR QL AUTO: ABNORMAL
SODIUM SERPL-SCNC: 141 MMOL/L (ref 135–145)
SP GR UR STRIP.AUTO: 1.02
UROBILINOGEN UR STRIP.AUTO-MCNC: 1 MG/DL
WBC # BLD AUTO: 10.1 K/UL (ref 4.8–10.8)
WBC #/AREA URNS HPF: ABNORMAL /HPF

## 2023-08-25 PROCEDURE — 700102 HCHG RX REV CODE 250 W/ 637 OVERRIDE(OP): Performed by: NURSE PRACTITIONER

## 2023-08-25 PROCEDURE — 85025 COMPLETE CBC W/AUTO DIFF WBC: CPT

## 2023-08-25 PROCEDURE — 36415 COLL VENOUS BLD VENIPUNCTURE: CPT

## 2023-08-25 PROCEDURE — A9270 NON-COVERED ITEM OR SERVICE: HCPCS | Performed by: REGISTERED NURSE

## 2023-08-25 PROCEDURE — 700102 HCHG RX REV CODE 250 W/ 637 OVERRIDE(OP): Performed by: REGISTERED NURSE

## 2023-08-25 PROCEDURE — 94669 MECHANICAL CHEST WALL OSCILL: CPT

## 2023-08-25 PROCEDURE — 97530 THERAPEUTIC ACTIVITIES: CPT | Mod: CQ

## 2023-08-25 PROCEDURE — 700111 HCHG RX REV CODE 636 W/ 250 OVERRIDE (IP): Performed by: NURSE PRACTITIONER

## 2023-08-25 PROCEDURE — 81001 URINALYSIS AUTO W/SCOPE: CPT

## 2023-08-25 PROCEDURE — 700101 HCHG RX REV CODE 250: Performed by: NURSE PRACTITIONER

## 2023-08-25 PROCEDURE — 80048 BASIC METABOLIC PNL TOTAL CA: CPT

## 2023-08-25 PROCEDURE — 770001 HCHG ROOM/CARE - MED/SURG/GYN PRIV*

## 2023-08-25 PROCEDURE — A9270 NON-COVERED ITEM OR SERVICE: HCPCS | Performed by: NURSE PRACTITIONER

## 2023-08-25 PROCEDURE — 99233 SBSQ HOSP IP/OBS HIGH 50: CPT | Performed by: REGISTERED NURSE

## 2023-08-25 RX ADMIN — METAXALONE 800 MG: 800 TABLET ORAL at 05:18

## 2023-08-25 RX ADMIN — ENOXAPARIN SODIUM 30 MG: 100 INJECTION SUBCUTANEOUS at 05:19

## 2023-08-25 RX ADMIN — ACETAMINOPHEN 650 MG: 325 TABLET, FILM COATED ORAL at 17:23

## 2023-08-25 RX ADMIN — CELECOXIB 100 MG: 100 CAPSULE ORAL at 17:22

## 2023-08-25 RX ADMIN — ACETAMINOPHEN 650 MG: 325 TABLET, FILM COATED ORAL at 12:42

## 2023-08-25 RX ADMIN — LIDOCAINE PATCH 5% 1 PATCH: 700 PATCH TOPICAL at 05:19

## 2023-08-25 RX ADMIN — METAXALONE 800 MG: 800 TABLET ORAL at 17:23

## 2023-08-25 RX ADMIN — DOCUSATE SODIUM 100 MG: 100 CAPSULE, LIQUID FILLED ORAL at 17:22

## 2023-08-25 RX ADMIN — ENOXAPARIN SODIUM 30 MG: 100 INJECTION SUBCUTANEOUS at 17:23

## 2023-08-25 RX ADMIN — MAGNESIUM HYDROXIDE 30 ML: 1200 LIQUID ORAL at 05:16

## 2023-08-25 RX ADMIN — ACETAMINOPHEN 650 MG: 325 TABLET, FILM COATED ORAL at 05:17

## 2023-08-25 RX ADMIN — POLYETHYLENE GLYCOL 3350 1 PACKET: 17 POWDER, FOR SOLUTION ORAL at 17:28

## 2023-08-25 RX ADMIN — DOCUSATE SODIUM 100 MG: 100 CAPSULE, LIQUID FILLED ORAL at 05:17

## 2023-08-25 RX ADMIN — CELECOXIB 100 MG: 100 CAPSULE ORAL at 05:18

## 2023-08-25 RX ADMIN — POLYETHYLENE GLYCOL 3350 1 PACKET: 17 POWDER, FOR SOLUTION ORAL at 05:14

## 2023-08-25 RX ADMIN — METAXALONE 800 MG: 800 TABLET ORAL at 12:42

## 2023-08-25 ASSESSMENT — ENCOUNTER SYMPTOMS
BLURRED VISION: 0
GASTROINTESTINAL NEGATIVE: 1
FOCAL WEAKNESS: 0
CONSTITUTIONAL NEGATIVE: 1
FALLS: 1
DIZZINESS: 0
PSYCHIATRIC NEGATIVE: 1
NEUROLOGICAL NEGATIVE: 1
CARDIOVASCULAR NEGATIVE: 1
RESPIRATORY NEGATIVE: 1
MYALGIAS: 1
DOUBLE VISION: 0
EYES NEGATIVE: 1

## 2023-08-25 ASSESSMENT — PAIN DESCRIPTION - PAIN TYPE
TYPE: ACUTE PAIN
TYPE: ACUTE PAIN

## 2023-08-25 ASSESSMENT — COGNITIVE AND FUNCTIONAL STATUS - GENERAL
MOVING TO AND FROM BED TO CHAIR: UNABLE
TURNING FROM BACK TO SIDE WHILE IN FLAT BAD: A LOT
WALKING IN HOSPITAL ROOM: A LOT
SUGGESTED CMS G CODE MODIFIER MOBILITY: CL
CLIMB 3 TO 5 STEPS WITH RAILING: TOTAL
MOBILITY SCORE: 11
STANDING UP FROM CHAIR USING ARMS: A LITTLE
MOVING FROM LYING ON BACK TO SITTING ON SIDE OF FLAT BED: A LOT

## 2023-08-25 ASSESSMENT — GAIT ASSESSMENTS: GAIT LEVEL OF ASSIST: UNABLE TO PARTICIPATE

## 2023-08-25 ASSESSMENT — PATIENT HEALTH QUESTIONNAIRE - PHQ9
1. LITTLE INTEREST OR PLEASURE IN DOING THINGS: NOT AT ALL
2. FEELING DOWN, DEPRESSED, IRRITABLE, OR HOPELESS: NOT AT ALL
SUM OF ALL RESPONSES TO PHQ9 QUESTIONS 1 AND 2: 0

## 2023-08-25 NOTE — ASSESSMENT & PLAN NOTE
Date of admission: 8/22/2023.  8/25 Rehab referral   Cleared for discharge: Yes - Date: 8/25.  Discharge delayed: No.  Discharge date: tbd.  Accepted to [facility], pending bed availability.

## 2023-08-25 NOTE — CARE PLAN
The patient is Stable - Low risk of patient condition declining or worsening    Shift Goals  Clinical Goals: Mobility ,wean off oxygen  Patient Goals: Rest and comfort  Family Goals: AYO    Progress made toward(s) clinical / shift goals:  on going    Patient is  progressing towards the following goals:  Problem: Knowledge Deficit - Standard  Goal: Patient and family/care givers will demonstrate understanding of plan of care, disease process/condition, diagnostic tests and medications  Outcome: Progressing     Problem: Pain - Standard  Goal: Alleviation of pain or a reduction in pain to the patient’s comfort goal  Outcome: Progressing     Problem: Fall Risk  Goal: Patient will remain free from falls  Outcome: Progressing

## 2023-08-25 NOTE — THERAPY
Physical Therapy   Daily Treatment     Patient Name: Ramesh Manjarrez  Age:  73 y.o., Sex:  male  Medical Record #: [id number]  Today's Date: 8/25/2023     Precautions  Precautions: Fall Risk;Toe Touch Weight Bearing Left Lower Extremity  Comments: ROMAT LLE x4 wks    Assessment    Pt greeted and seen for PT treatment. Pt somewhat hesitant around mobility, reported to needing x3 assist, but x1 assist was plenty for this session. Pt req'd ModA and frequent VC for bed mobility. Pt was able to stand and transfer to chair w/ FWW and CGA. Pt currently limited by impaired balance, weakness, decreased sequencing and coordination, decreased activity tolerance, impaired insight to deficits, impaired safety, and impaired cognition which negatively impacts functional mobility. Pt will continue to benefit from skilled PT to address deficits.       Plan    Treatment Plan Status: Continue Current Treatment Plan  Type of Treatment: Bed Mobility, Equipment, Gait Training, Neuro Re-Education / Balance, Self Care / Home Evaluation, Therapeutic Activities, Therapeutic Exercise, Stair Training  Treatment Frequency: 5 Times per Week  Treatment Duration: Until Therapy Goals Met    DC Equipment Recommendations: Unable to determine at this time  Discharge Recommendations: Recommend post-acute placement for additional physical therapy services prior to discharge home       08/25/23 1323   Cognition    Comments pt reported needing x3 assist for supine>sit, x1 assist was plenty for this session. maintained TTWB. pleasant, agreeable   Balance   Sitting Balance (Static) Fair   Sitting Balance (Dynamic) Fair   Standing Balance (Static) Fair -   Standing Balance (Dynamic) Fair -   Weight Shift Sitting Fair   Weight Shift Standing Poor   Skilled Intervention Verbal Cuing;Compensatory Strategies   Comments w/ FWW   Bed Mobility    Supine to Sit Moderate Assist   Scooting Standby Assist   Skilled Intervention Verbal Cuing;Facilitation   Comments req'd  heavy VC for bed mobility   Gait Analysis   Gait Level Of Assist Unable to Participate   Functional Mobility   Sit to Stand Contact Guard Assist   Bed, Chair, Wheelchair Transfer Contact Guard Assist   Transfer Method   (step hop/pivot)   Mobility bed mobility, transfer to chair   Skilled Intervention Verbal Cuing;Compensatory Strategies   Comments pt was able to hop for transfer, but unable to hop further distance   Short Term Goals    Short Term Goal # 1 Pt will perform supine<>sit with HOB flat and min A within 6 visits.   Goal Outcome # 1 Progressing as expected   Short Term Goal # 2 Pt will perform bed<>chair transfers with FWW and SPV within 6 visits.   Goal Outcome # 2 Progressing as expected   Short Term Goal # 3 Pt will amb >10ft with FWW and min A within 6 visits.   Goal Outcome # 3 Goal not met   Supervising Physical Therapist (PTA Treatments Only)   Supervising Physical Therapist Justina Cintron

## 2023-08-25 NOTE — DISCHARGE PLANNING
HTH/SCP TCN chart review completed. Discussed current dc planning considerations with BARBARA Olvera. As prior, current discharge considerations are anticipated dc to post acute placement given current functional status/precautions and most recent therapy recs. Note that pt has been accepted to both [facility] as well and SW has reached out for bed availability as well. Appreciate SW follow up. TCN will continue to follow and collaborate with discharge planning team as additional post acute needs arise. Thank you.     Completed:  PT/OT rec post acute placement on 8/24  Choice obtained: IRF, SNF, HH, DME (02/ADs); see above  [facility] referral not sent

## 2023-08-25 NOTE — DISCHARGE PLANNING
Case Management Discharge Planning    Admission Date: 8/22/2023  GMLOS: 3.8  ALOS: 2    6-Clicks ADL Score: 15  6-Clicks Mobility Score: 10  PT and/or OT Eval ordered: Yes  Post-acute Referrals Ordered: Yes  Post-acute Choice Obtained: Yes  Has referral(s) been sent to post-acute provider:  Yes      Anticipated Discharge Dispo: Discharge Disposition: D/T to SNF with Medicare cert in anticipation of skilled care (03)  Discharge Address: Pending referrals  Discharge Contact Phone Number: Pending referrals    DME Needed: No    Action(s) Taken: OTHER    Pt is medically cleared to dc to a SNF. Patient has been accepted to both [facility]. LSW left voicemail for Nohemy/admissions w/ [facility] inquiring about bed availability. Awaiting response.     If bed is not available, plan is to dc patient to [facility].     Update@1:50:     Per Nohemy, there is no bed availability until Monday 08/28. LSW left a voicemail for admissions with [facility] inquiring about bed availability. Awaiting response     No other CM needs identified at this time.     Escalations Completed: None    Medically Clear: Yes    Next Steps: Pending bed availability.     Barriers to Discharge: Pending Placement    Is the patient up for discharge tomorrow: No

## 2023-08-25 NOTE — CONSULTS
Date of Service: 23    Ramesh Manjarrez was seen today in consultation for left acetabular fracture at the request of Dr. Gee    CC: Left hip pain    HPI: Ramesh Manjarrez is a 73 y.o. male who presents with complaints of pain to left hip.  This started after a mechanical ground-level fall.  He landed on his left hip had pain to the left hip area and difficulty bearing weight.  The pain is 5/10 and is described as sharp.  The pain is made worse by palpation of the area and made better by rest and immobilization.    PMH:   Past Medical History:   Diagnosis Date    BPH with elevated PSA     Tinnitus, bilateral        PSH:   Past Surgical History:   Procedure Laterality Date    INGUINAL HERNIA REPAIR Left     PROSTATE NEEDLE BIOPSY      x4 reports all benign       FH:   Family History   Problem Relation Age of Onset    Other Mother         Liver disease    Diabetes Father     Heart Attack Father         77       SH:   Social History     Socioeconomic History    Marital status:      Spouse name: Not on file    Number of children: Not on file    Years of education: Not on file    Highest education level: Not on file   Occupational History    Not on file   Tobacco Use    Smoking status: Former     Current packs/day: 0.00     Average packs/day: 1 pack/day for 10.0 years (10.0 ttl pk-yrs)     Types: Cigarettes     Start date:      Quit date:      Years since quittin.6    Smokeless tobacco: Never   Vaping Use    Vaping Use: Never used   Substance and Sexual Activity    Alcohol use: Yes     Comment: 1 drink a month    Drug use: Never    Sexual activity: Not Currently   Other Topics Concern    Not on file   Social History Narrative    Not on file     Social Determinants of Health     Financial Resource Strain: Not on file   Food Insecurity: Not on file   Transportation Needs: Not on file   Physical Activity: Not on file   Stress: Not on file   Social Connections: Not on file   Intimate Partner  "Violence: Not on file   Housing Stability: Not on file       ROS: In review of the following systems: Constitutional, Eyes, ENT, Cardiovascular,Respiratory, GI, , Musculoskeletal, Skin, Neuro, Psych, Hematologic, Endocrine, Allergic; no pertinent findings were found related to the chief complaint and orthopedic injury     BP (!) 166/95   Pulse 86   Temp 36.7 °C (98.1 °F) (Temporal)   Resp 16   Ht 1.702 m (5' 7.01\")   Wt 82 kg (180 lb 12.4 oz)   SpO2 91%     Physical Exam:  General: Well nourished, well developed, age appropriate appearance   HEENT: Normocephalic, atraumatic  Psych: Normal mood and affect  Neck: Supple, no pain to motion  Chest/Pulmonary: breathing unlabored, no audible wheezing  Cardio: Regular heart rate and rhythm  Neuro: Sensation grossly intact to BUE and BLE, moving all four extremities  Skin: Intact with no full thickness abrasions or lacerations  MSK: No crepitus or pain to passive range of motion of the left hip or logrolling of the left hip.  No tenderness distally at the knee leg or ankle.  The other 3 extremities are atraumatic and nontender.    Imaging and labs: X-ray of the pelvis showed a symmetric pelvic ring.  CT scan of the pelvis showed nondisplaced fracture involving the anterior column extending into the quadrilateral surface of the left acetabulum.  No articular step-off.  Severe osteopenic bone.    Recent Labs     08/23/23  0010 08/24/23  0419 08/25/23  0358   WBC 14.6* 12.1* 10.1   RBC 4.98 4.63* 4.44*   HEMOGLOBIN 15.2 14.6 13.6*   HEMATOCRIT 45.7 43.0 40.4*   MCV 91.8 92.9 91.0   MCH 30.5 31.5 30.6   RDW 41.3 43.4 42.1   PLATELETCT 196 151* 141*   MPV 10.5 10.5 10.7   NEUTSPOLYS  --  82.30* 76.70*   LYMPHOCYTES  --  7.00* 7.80*   MONOCYTES  --  7.60 11.60   EOSINOPHILS  --  2.10 3.00   BASOPHILS  --  0.40 0.40       Assessment:   1. Closed nondisplaced fracture of left acetabulum, unspecified portion of acetabulum, initial encounter ([facility]) Acute       2. Closed " fracture of left inferior pubic ramus, initial encounter ([facility]) Acute       3. Acute midline low back pain without sciatica Acute       4. Closed fracture of one rib of left side, initial encounter Acute       5. Aneurysm of ascending aorta without rupture ([facility]) Acute       6. Ground-level fall Acute       7. Discharge planning issues  Referral to Urology      8. Hematuria, unspecified type            I discussed the diagnosis and findings with the patient at length.  I reviewed possible non operative and operative interventions and the risks and benefits of each of these.  he had a chance to ask questions and all of these were answered to his satisfaction.        Plan:  Nonoperative management of left acetabular fracture  Toe-touch weightbearing left lower extremity x4 weeks then weightbearing as tolerated  Repeat x-rays in the office in 10 to 14 days  PT/OT  Mobilize  Discharge planning

## 2023-08-25 NOTE — PROGRESS NOTES
Trauma / Surgical Daily Progress Note    Date of Service  8/25/2023    Chief Complaint  73 y.o. male admitted 8/22/2023 with pelvic and rib fractures after a mechanical ground level fall.     Interval Events  Patient doing well, hesitant to mobilize, encouraged and educated on importance.   Discussed incidental findings of thoracic aneurysm and importance of follow up, agreeable.  Disposition pending IPR placement.   Complains of hematuria, UA negative for infection, denies dysuria, will monitor, f/u with urology on outpatient basis.   Pain overall improved with multimodal pain regimen.     Review of Systems  Review of Systems   Constitutional: Negative.    Eyes: Negative.  Negative for blurred vision and double vision.   Respiratory: Negative.     Cardiovascular: Negative.    Gastrointestinal: Negative.    Genitourinary: Negative.    Musculoskeletal:  Positive for falls, joint pain and myalgias.   Neurological: Negative.  Negative for dizziness and focal weakness.   Psychiatric/Behavioral: Negative.     All other systems reviewed and are negative.       Vital Signs  Temp:  [36.6 °C (97.9 °F)-36.8 °C (98.2 °F)] 36.7 °C (98.1 °F)  Pulse:  [] 86  Resp:  [16-18] 16  BP: (126-167)/() 166/95  SpO2:  [89 %-94 %] 91 %    Physical Exam  Physical Exam  Vitals and nursing note reviewed.   Constitutional:       General: He is not in acute distress.     Appearance: Normal appearance.      Interventions: Nasal cannula in place.   HENT:      Head: Normocephalic and atraumatic.      Nose: Nose normal.      Mouth/Throat:      Mouth: Mucous membranes are moist.      Pharynx: Oropharynx is clear.   Eyes:      Extraocular Movements: Extraocular movements intact.      Right eye: No nystagmus.      Left eye: No nystagmus.      Conjunctiva/sclera: Conjunctivae normal.      Pupils: Pupils are equal, round, and reactive to light.   Cardiovascular:      Rate and Rhythm: Normal rate.      Pulses: Normal pulses.      Heart  sounds: No murmur heard.  Pulmonary:      Effort: Pulmonary effort is normal. No respiratory distress.   Chest:      Chest wall: No deformity, tenderness or crepitus.   Abdominal:      General: Abdomen is flat. Bowel sounds are normal.      Palpations: Abdomen is soft.   Musculoskeletal:         General: Normal range of motion.      Cervical back: Full passive range of motion without pain, normal range of motion and neck supple.      Right lower leg: No edema.      Left lower leg: No edema.   Skin:     General: Skin is warm and dry.      Capillary Refill: Capillary refill takes less than 2 seconds.      Comments: Abrasion to anterior left forearm.    Neurological:      General: No focal deficit present.      Mental Status: He is alert and oriented to person, place, and time. Mental status is at baseline.      GCS: GCS eye subscore is 4. GCS verbal subscore is 5. GCS motor subscore is 6.      Sensory: Sensation is intact.      Motor: Motor function is intact.   Psychiatric:         Mood and Affect: Mood normal.         Laboratory  Recent Results (from the past 24 hour(s))   CBC with Differential: Tomorrow AM    Collection Time: 08/25/23  3:58 AM   Result Value Ref Range    WBC 10.1 4.8 - 10.8 K/uL    RBC 4.44 (L) 4.70 - 6.10 M/uL    Hemoglobin 13.6 (L) 14.0 - 18.0 g/dL    Hematocrit 40.4 (L) 42.0 - 52.0 %    MCV 91.0 81.4 - 97.8 fL    MCH 30.6 27.0 - 33.0 pg    MCHC 33.7 32.3 - 36.5 g/dL    RDW 42.1 35.9 - 50.0 fL    Platelet Count 141 (L) 164 - 446 K/uL    MPV 10.7 9.0 - 12.9 fL    Neutrophils-Polys 76.70 (H) 44.00 - 72.00 %    Lymphocytes 7.80 (L) 22.00 - 41.00 %    Monocytes 11.60 0.00 - 13.40 %    Eosinophils 3.00 0.00 - 6.90 %    Basophils 0.40 0.00 - 1.80 %    Immature Granulocytes 0.50 0.00 - 0.90 %    Nucleated RBC 0.00 0.00 - 0.20 /100 WBC    Neutrophils (Absolute) 7.77 (H) 1.82 - 7.42 K/uL    Lymphs (Absolute) 0.79 (L) 1.00 - 4.80 K/uL    Monos (Absolute) 1.18 (H) 0.00 - 0.85 K/uL    Eos (Absolute) 0.30  0.00 - 0.51 K/uL    Baso (Absolute) 0.04 0.00 - 0.12 K/uL    Immature Granulocytes (abs) 0.05 0.00 - 0.11 K/uL    NRBC (Absolute) 0.00 K/uL   Basic Metabolic Panel (BMP): Tomorrow AM    Collection Time: 08/25/23  3:58 AM   Result Value Ref Range    Sodium 141 135 - 145 mmol/L    Potassium 3.6 3.6 - 5.5 mmol/L    Chloride 106 96 - 112 mmol/L    Co2 28 20 - 33 mmol/L    Glucose 121 (H) 65 - 99 mg/dL    Bun 20 8 - 22 mg/dL    Creatinine 1.05 0.50 - 1.40 mg/dL    Calcium 8.5 8.5 - 10.5 mg/dL    Anion Gap 7.0 7.0 - 16.0   ESTIMATED GFR    Collection Time: 08/25/23  3:58 AM   Result Value Ref Range    GFR (CKD-EPI) 74 >60 mL/min/1.73 m 2   URINALYSIS    Collection Time: 08/25/23  9:49 AM    Specimen: Urine, Clean Catch   Result Value Ref Range    Color Yellow     Character Cloudy (A)     Specific Gravity 1.020 <1.035    Ph 7.0 5.0 - 8.0    Glucose Negative Negative mg/dL    Ketones Trace (A) Negative mg/dL    Protein Negative Negative mg/dL    Bilirubin Negative Negative    Urobilinogen, Urine 1.0 Negative    Nitrite Negative Negative    Leukocyte Esterase Trace (A) Negative    Occult Blood Moderate (A) Negative    Micro Urine Req Microscopic    URINE MICROSCOPIC (W/UA)    Collection Time: 08/25/23  9:49 AM   Result Value Ref Range    WBC 2-5 (A) /hpf    RBC >150 (A) /hpf    Bacteria Negative None /hpf    Epithelial Cells Negative /hpf    Hyaline Cast 0-2 /lpf       Fluids    Intake/Output Summary (Last 24 hours) at 8/25/2023 1045  Last data filed at 8/25/2023 0600  Gross per 24 hour   Intake --   Output 500 ml   Net -500 ml       Core Measures & Quality Metrics  Radiology images reviewed, Labs reviewed and Medications reviewed  Jang catheter: No Jang      DVT Prophylaxis: Enoxaparin (Lovenox)  DVT prophylaxis - mechanical: SCDs      Assessed for rehab: Patient was assess for and/or received rehabilitation services during this hospitalization    RAP Score Total: 4    CAGE Results: negative Blood Alcohol>0.08: no        Assessment/Plan  * Trauma- (present on admission)  Assessment & Plan  Frequent falls. Tripped and fell today. Unable to ambulate since fall.  Non Trauma Activation.  Susy Gee MD. Trauma Surgery.    Discharge planning issues  Assessment & Plan  Date of admission: 8/22/2023.  8/25 Rehab referral   Cleared for discharge: Yes - Date: 8/25.  Discharge delayed: No.  Discharge date: tbd.      Multiple closed pelvic fractures without disruption of pelvic [place] (HCC)- (present on admission)  Assessment & Plan  Left inferior pubic ramus fracture.  Medial left acetabular fracture.  Non-operative management.  Weight bearing status - Touch toe weightbearing LLE x 4 weeks.   Romeo Bowman MD. Orthopedic Surgeon. [facility].     Closed fracture of one rib of left side- (present on admission)  Assessment & Plan  Anterior left 10th rib fracture.  CT of chest completed, no pneumothorax, confirms left anterolateral 10th rib fracture.   Aggressive multimodal pain management, and pulmonary hygiene. Serial chest radiographs.    Bilateral atelectasis- (present on admission)  Assessment & Plan  Bibasilar atelectasis, possible component of lower lobe infiltrates  Aggressive pulmonary hygiene. Serial chest radiographs.    No contraindication to deep vein thrombosis (DVT) prophylaxis- (present on admission)  Assessment & Plan  Prophylactic dose enoxaparin 30 mg BID initiated upon admission.    Chronic compression fracture of L3 vertebra (HCC)- (present on admission)  Assessment & Plan  Anterior compression form body of L3, appears likely chronic.  CT of lumbar spine confirms L3 fracture is chronic. No acute fracture of lumbar spine.  Analgesia.    Ascending aortic aneurysm (HCC)- (present on admission)  Assessment & Plan  Incidental finding.  4.5 cm ascending thoracic aortic aneurysm, radiographic follow-up and stability recommended as clinically appropriate.        Discussed patient condition with RN, Patient,  and trauma surgery .

## 2023-08-26 VITALS
DIASTOLIC BLOOD PRESSURE: 86 MMHG | OXYGEN SATURATION: 90 % | HEIGHT: 67 IN | WEIGHT: 180.78 LBS | BODY MASS INDEX: 28.37 KG/M2 | HEART RATE: 88 BPM | TEMPERATURE: 98.7 F | SYSTOLIC BLOOD PRESSURE: 135 MMHG | RESPIRATION RATE: 17 BRPM

## 2023-08-26 LAB
ANION GAP SERPL CALC-SCNC: 11 MMOL/L (ref 7–16)
BASOPHILS # BLD AUTO: 0.6 % (ref 0–1.8)
BASOPHILS # BLD: 0.06 K/UL (ref 0–0.12)
BUN SERPL-MCNC: 19 MG/DL (ref 8–22)
CALCIUM SERPL-MCNC: 8.4 MG/DL (ref 8.5–10.5)
CHLORIDE SERPL-SCNC: 106 MMOL/L (ref 96–112)
CO2 SERPL-SCNC: 25 MMOL/L (ref 20–33)
CREAT SERPL-MCNC: 1 MG/DL (ref 0.5–1.4)
EOSINOPHIL # BLD AUTO: 0.34 K/UL (ref 0–0.51)
EOSINOPHIL NFR BLD: 3.4 % (ref 0–6.9)
ERYTHROCYTE [DISTWIDTH] IN BLOOD BY AUTOMATED COUNT: 42.2 FL (ref 35.9–50)
GFR SERPLBLD CREATININE-BSD FMLA CKD-EPI: 79 ML/MIN/1.73 M 2
GLUCOSE SERPL-MCNC: 114 MG/DL (ref 65–99)
HCT VFR BLD AUTO: 42.8 % (ref 42–52)
HGB BLD-MCNC: 14.3 G/DL (ref 14–18)
IMM GRANULOCYTES # BLD AUTO: 0.04 K/UL (ref 0–0.11)
IMM GRANULOCYTES NFR BLD AUTO: 0.4 % (ref 0–0.9)
LYMPHOCYTES # BLD AUTO: 0.92 K/UL (ref 1–4.8)
LYMPHOCYTES NFR BLD: 9.3 % (ref 22–41)
MCH RBC QN AUTO: 30.6 PG (ref 27–33)
MCHC RBC AUTO-ENTMCNC: 33.4 G/DL (ref 32.3–36.5)
MCV RBC AUTO: 91.6 FL (ref 81.4–97.8)
MONOCYTES # BLD AUTO: 1.31 K/UL (ref 0–0.85)
MONOCYTES NFR BLD AUTO: 13.2 % (ref 0–13.4)
NEUTROPHILS # BLD AUTO: 7.23 K/UL (ref 1.82–7.42)
NEUTROPHILS NFR BLD: 73.1 % (ref 44–72)
NRBC # BLD AUTO: 0 K/UL
NRBC BLD-RTO: 0 /100 WBC (ref 0–0.2)
PLATELET # BLD AUTO: 164 K/UL (ref 164–446)
PMV BLD AUTO: 11.2 FL (ref 9–12.9)
POTASSIUM SERPL-SCNC: 3.8 MMOL/L (ref 3.6–5.5)
RBC # BLD AUTO: 4.67 M/UL (ref 4.7–6.1)
SODIUM SERPL-SCNC: 142 MMOL/L (ref 135–145)
WBC # BLD AUTO: 9.9 K/UL (ref 4.8–10.8)

## 2023-08-26 PROCEDURE — 80048 BASIC METABOLIC PNL TOTAL CA: CPT

## 2023-08-26 PROCEDURE — 700102 HCHG RX REV CODE 250 W/ 637 OVERRIDE(OP): Performed by: REGISTERED NURSE

## 2023-08-26 PROCEDURE — A9270 NON-COVERED ITEM OR SERVICE: HCPCS | Performed by: NURSE PRACTITIONER

## 2023-08-26 PROCEDURE — A9270 NON-COVERED ITEM OR SERVICE: HCPCS | Performed by: REGISTERED NURSE

## 2023-08-26 PROCEDURE — 85025 COMPLETE CBC W/AUTO DIFF WBC: CPT

## 2023-08-26 PROCEDURE — 700111 HCHG RX REV CODE 636 W/ 250 OVERRIDE (IP): Performed by: NURSE PRACTITIONER

## 2023-08-26 PROCEDURE — 36415 COLL VENOUS BLD VENIPUNCTURE: CPT

## 2023-08-26 PROCEDURE — 700102 HCHG RX REV CODE 250 W/ 637 OVERRIDE(OP): Performed by: NURSE PRACTITIONER

## 2023-08-26 PROCEDURE — 99239 HOSP IP/OBS DSCHRG MGMT >30: CPT | Performed by: REGISTERED NURSE

## 2023-08-26 PROCEDURE — 700101 HCHG RX REV CODE 250: Performed by: NURSE PRACTITIONER

## 2023-08-26 RX ORDER — ENOXAPARIN SODIUM 100 MG/ML
30 INJECTION SUBCUTANEOUS EVERY 12 HOURS
Status: ACTIVE | DISCHARGE
Start: 2023-08-26 | End: 2023-10-04

## 2023-08-26 RX ORDER — OXYCODONE HYDROCHLORIDE 5 MG/1
5 TABLET ORAL EVERY 4 HOURS PRN
Qty: 18 TABLET | Refills: 0 | Status: SHIPPED | OUTPATIENT
Start: 2023-08-26 | End: 2023-08-29

## 2023-08-26 RX ORDER — METAXALONE 800 MG/1
800 TABLET ORAL 3 TIMES DAILY
Qty: 21 TABLET | Refills: 0 | Status: SHIPPED | OUTPATIENT
Start: 2023-08-26 | End: 2023-09-02

## 2023-08-26 RX ORDER — CELECOXIB 100 MG/1
100 CAPSULE ORAL 2 TIMES DAILY
Qty: 14 CAPSULE | Refills: 0 | Status: SHIPPED | OUTPATIENT
Start: 2023-08-26 | End: 2023-09-02

## 2023-08-26 RX ORDER — ACETAMINOPHEN 325 MG/1
650 TABLET ORAL EVERY 6 HOURS
Qty: 30 TABLET | Refills: 0 | COMMUNITY
Start: 2023-08-26 | End: 2023-10-04

## 2023-08-26 RX ADMIN — MAGNESIUM HYDROXIDE 30 ML: 1200 LIQUID ORAL at 05:30

## 2023-08-26 RX ADMIN — OXYCODONE HYDROCHLORIDE 5 MG: 5 TABLET ORAL at 05:29

## 2023-08-26 RX ADMIN — DOCUSATE SODIUM 100 MG: 100 CAPSULE, LIQUID FILLED ORAL at 05:29

## 2023-08-26 RX ADMIN — METAXALONE 800 MG: 800 TABLET ORAL at 05:29

## 2023-08-26 RX ADMIN — ENOXAPARIN SODIUM 30 MG: 100 INJECTION SUBCUTANEOUS at 05:30

## 2023-08-26 RX ADMIN — ACETAMINOPHEN 650 MG: 325 TABLET, FILM COATED ORAL at 05:29

## 2023-08-26 RX ADMIN — ACETAMINOPHEN 650 MG: 325 TABLET, FILM COATED ORAL at 00:48

## 2023-08-26 RX ADMIN — LIDOCAINE PATCH 5% 1 PATCH: 700 PATCH TOPICAL at 05:30

## 2023-08-26 RX ADMIN — CELECOXIB 100 MG: 100 CAPSULE ORAL at 05:29

## 2023-08-26 RX ADMIN — METAXALONE 800 MG: 800 TABLET ORAL at 13:14

## 2023-08-26 RX ADMIN — ACETAMINOPHEN 650 MG: 325 TABLET, FILM COATED ORAL at 13:14

## 2023-08-26 ASSESSMENT — ENCOUNTER SYMPTOMS
DOUBLE VISION: 0
FOCAL WEAKNESS: 0
MYALGIAS: 1
CONSTITUTIONAL NEGATIVE: 1
NEUROLOGICAL NEGATIVE: 1
CARDIOVASCULAR NEGATIVE: 1
GASTROINTESTINAL NEGATIVE: 1
EYES NEGATIVE: 1
FALLS: 1
DIZZINESS: 0
PSYCHIATRIC NEGATIVE: 1
RESPIRATORY NEGATIVE: 1
BLURRED VISION: 0

## 2023-08-26 ASSESSMENT — PAIN DESCRIPTION - PAIN TYPE: TYPE: ACUTE PAIN

## 2023-08-26 NOTE — DISCHARGE SUMMARY
Trauma Discharge Summary    DATE OF ADMISSION: 8/22/2023    DATE OF DISCHARGE: 8/26/2023    LENGTH OF STAY: 4 days    ATTENDING PHYSICIAN: Susy Gee M.D.    CONSULTING PHYSICIAN:   Cristofer Bowman M.D. Surgery Orthopedic    DISCHARGE DIAGNOSIS:  Principal Problem:    Trauma  Active Problems:    Closed fracture of one rib of left side    Multiple closed pelvic fractures without disruption of pelvic [place] (HCC)    Discharge planning issues    Chronic compression fracture of L3 vertebra (HCC)    No contraindication to deep vein thrombosis (DVT) prophylaxis    Bilateral atelectasis    Ascending aortic aneurysm (HCC)  Resolved Problems:    * No resolved hospital problems. *      PROCEDURES:  1. None    HISTORY OF PRESENT ILLNESS: The patient is a 73 y.o. male who was reportedly injured in a mechanical ground-level fall.  The patient was transferred to [facility] in [place], [place].    HOSPITAL COURSE: The patient was triaged as a trauma consult activation.  The patient suffered multiple closed pelvic fractures without disruption of the pelvic cervical.  Orthopedic surgery was consulted recommending nonoperative management toe-touch weightbearing to the left lower extremity x4 weeks.  The patient was transported to trauma [facility].  The patient had an uncomplicated hospital course.  He was noted to have hematuria without any dysuria or frequency.  UA did not show any evidence of acute infection.  Referral for outpatient urology placed.  Additionally the patient was made aware of an incidental finding of a thoracic aortic aneurysm, he will need to follow-up with his PCP for regular surveillance.  He verbalized understanding of this and is agreeable.  He was evaluated by physical and Occupational Therapy who recommended postacute services for acute rehab and Occupational Therapy.  The patient is agreeable to this.  [facility]  On day of discharge his pain is well managed  with oral analgesia, he is tolerating a regular diet, and he is motivated for rehab.  He will be discharged to rehab with close follow-up with orthopedic surgery.    HOSPITAL PROBLEM LIST:  * Trauma- (present on admission)  Assessment & Plan  Frequent falls. Tripped and fell today. Unable to ambulate since fall.  Non Trauma Activation.  Susy Gee MD. Trauma Surgery.    Discharge planning issues  Assessment & Plan  Date of admission: 8/22/2023.  8/25 Rehab referral   Cleared for discharge: Yes - Date: 8/25.  Discharge delayed: No.  Discharge date: tbd.  Accepted to [facility], pending bed availability.       Multiple closed pelvic fractures without disruption of pelvic [place] (HCC)- (present on admission)  Assessment & Plan  Left inferior pubic ramus fracture.  Medial left acetabular fracture.  Non-operative management.  Weight bearing status - Touch toe weightbearing LLE x 4 weeks.   Romeo Bowman MD. Orthopedic Surgeon. [facility].     Closed fracture of one rib of left side- (present on admission)  Assessment & Plan  Anterior left 10th rib fracture.  CT of chest completed, no pneumothorax, confirms left anterolateral 10th rib fracture.   Aggressive multimodal pain management, and pulmonary hygiene. Serial chest radiographs.    Bilateral atelectasis- (present on admission)  Assessment & Plan  Bibasilar atelectasis, possible component of lower lobe infiltrates  Aggressive pulmonary hygiene. Serial chest radiographs.    No contraindication to deep vein thrombosis (DVT) prophylaxis- (present on admission)  Assessment & Plan  Prophylactic dose enoxaparin 30 mg BID initiated upon admission.    Chronic compression fracture of L3 vertebra (HCC)- (present on admission)  Assessment & Plan  Anterior compression form body of L3, appears likely chronic.  CT of lumbar spine confirms L3 fracture is chronic. No acute fracture of lumbar spine.  Analgesia.    Ascending aortic aneurysm (HCC)- (present on  admission)  Assessment & Plan  Incidental finding.  4.5 cm ascending thoracic aortic aneurysm, radiographic follow-up and stability recommended as clinically appropriate.          DISPOSITION: Discharged to [facility] on 8/26/23. The patient was and family were counseled and questions were answered. Specifically, signs and symptoms of infection, respiratory decompensation, and persistent or worsening pain were discussed and the patient agrees to seek medical attention if any of these develop.    DISCHARGE MEDICATIONS:  The patients controlled substance history was reviewed and a controlled substance use informed consent (if applicable) was provided by [facility] and the patient has been prescribed.     Medication List        START taking these medications        Instructions   acetaminophen 325 MG Tabs  Commonly known as: Tylenol   Take 2 Tablets by mouth every 6 hours.  Dose: 650 mg     celecoxib 100 MG Caps  Commonly known as: CeleBREX   Take 1 Capsule by mouth 2 times a day for 7 days.  Dose: 100 mg     enoxaparin 30 MG/0.3ML Sosy inj  Commonly known as: Lovenox   Inject 0.3 mL under the skin every 12 hours.  Dose: 30 mg     metaxalone 800 MG Tabs  Commonly known as: Skelaxin   Take 1 Tablet by mouth 3 times a day for 7 days.  Dose: 800 mg     oxyCODONE immediate-release 5 MG Tabs  Commonly known as: Roxicodone   Take 1 Tablet by mouth every four hours as needed for Severe Pain for up to 3 days.  Dose: 5 mg              ACTIVITY:  Toe-touch weightbearing left lower extremity x4 weeks.    WOUND CARE:  Applicable    DIET:  Orders Placed This Encounter   Procedures    Diet Order Diet: Regular     Standing Status:   Standing     Number of Occurrences:   1     Order Specific Question:   Diet:     Answer:   Regular [1]       FOLLOW UP:  Romeo Bowman M.D.  [address] [place] [id number] [phone]    Follow up in 2 week(s)      Armaan Koch D.O.  [address] [facility] [address]  [place]  [id number]  [phone]    Follow up  Follow up for thoracic aorta aneurysm surviellance and hematuria.    [facility] [address]  [place] [id number]  [phone]  Follow up  As needed      TIME SPENT ON DISCHARGE: 45 minutes      ____________________________________________  ALLY Ponce    DD: 8/26/2023 1:21 PM

## 2023-08-26 NOTE — DISCHARGE INSTRUCTIONS
- Call or seek medical attention for questions or concerns  - Follow up with the [facility] RETURN: as needed.  - Follow up with Dr. Bowman in 2 weeks time  - Follow up with primary care provider within one weeks time  - Resume regular diet  - May take over the counter acetaminophen  - Continue daily over the counter stool softener while on narcotics  - No operation of machinery or motorized vehicles while under the influence of narcotics  - No alcohol, marijuana or illicit drug use while under the influence of narcotics  - In the event of a narcotic overdose naloxone (Narcan) is available without a prescription from any [facility] or [facility]  - No swimming, hot tubs, baths or wound submersion until cleared by outpatient provider. May shower  - No contact sports, strenuous activities, or heavy lifting until cleared by outpatient provider  - If respiratory decompensation, persistent or worsening pain, or signs or symptoms of infection occur seek medical attention

## 2023-08-26 NOTE — DISCHARGE PLANNING
Case Management Discharge Planning    Admission Date: 8/22/2023  GMLOS: 3.8  ALOS: 3    6-Clicks ADL Score: 15  6-Clicks Mobility Score: 11  PT and/or OT Eval ordered: Yes  Post-acute Referrals Ordered: Yes  Post-acute Choice Obtained: Yes  Has referral(s) been sent to post-acute provider:  Yes      Anticipated Discharge Dispo: Discharge Disposition: D/T to SNF with Medicare cert in anticipation of skilled care (03)  Discharge Address: Pending referrals  Discharge Contact Phone Number: Pending referrals    DME Needed: No    Action(s) Taken: Patient is set up for discharge to [facility] via [facility] transport at 1500 today.  RN CM met with the patient/family at bedside to discuss, patient consented to transfer.  RN CM delivered the 2nd IMM to the patient, patient signed the IMM at 1418.  Transfer packet completed and handed off to bedside DEEP Isabel.    Escalations Completed: Pending Discharge Destination and Transportation Vendor    Medically Clear: Yes    Next Steps: Care coordination available to discuss any discharge barriers.    Barriers to Discharge: None    Is the patient up for discharge tomorrow: Discharging today.

## 2023-08-26 NOTE — DOCUMENTATION QUERY
[facility]                                                                       Query Response Note      PATIENT:               RAYA BLANK  ACCT #:                  [id number]  MRN:                     [id number]  :                      1950  ADMIT DATE:       2023 7:30 PM  DISCH DATE:          RESPONDING  PROVIDER #:        [id number]           QUERY TEXT:    Patient had ground-level fall resulting in pelvic/ acetabular fractures.  Severe osteopenia is documented.  Please clarify the type of fracture:          The patient's Clinical Indicators include:   Ortho Note:  ground-level fall after a mechanical trip and fall.  CT scan shows acetabular fracture involving anterior column and medial acetabular wall.  Severe osteopenia to pelvis.   H&P and Progress Notes:  Multiple closed pelvic fractures without disruption of pelvic [place] : left inferior pubic ramus fracture, medial left acetabular fracture   Risk Factors: fall, osteopenia  Treatment:  non-op management, toe-touch weight bearing, ROM as tolerated, PT/OT    Thank you,  Porfirio Lyle RN, BSN, CCDS  Clinical   Connect via [place]  Options provided:   -- Fracture is pathological (due to weakening of bone due to osteoporosis, malignancy, osteomalacia, etc.   -- Fracture due to a combination of pathological process and mild trauma that alone would likely not have caused the fracture   -- Fracture due to trauma alone   -- Other etiology of fracture, (please document other etiology of fracture   -- Unable to determine      Query created by: Porfirio Lyle on 2023 1:02 PM    RESPONSE TEXT:    Fracture is pathological (due to weakening of bone due to osteoporosis, malignancy, osteomalacia, etc.          Electronically signed by:  LICO RANDALL MD 2023 6:42 AM

## 2023-08-26 NOTE — DISCHARGE PLANNING
Patient to transfer to [facility] today at 1500 via [facility]. TAYA silva transport,. Elizabeth at [facility] advised of transport time. DEEP Herron advised.

## 2023-08-26 NOTE — PROGRESS NOTES
Trauma / Surgical Daily Progress Note    Date of Service  8/26/2023    Chief Complaint  73 y.o. male admitted 8/22/2023 with pelvic and rib fractures after a mechanical ground level fall.     Interval Events  Patient mobilizing and overall progressing much better.  Pain is well controlled.   Patient in much better spirits.   Accepted to [facility], pending bed availability.   Wean oxygen as able.   IS 1200 ml.     Review of Systems  Review of Systems   Constitutional: Negative.    Eyes: Negative.  Negative for blurred vision and double vision.   Respiratory: Negative.     Cardiovascular: Negative.    Gastrointestinal: Negative.    Genitourinary: Negative.    Musculoskeletal:  Positive for falls, joint pain and myalgias.   Neurological: Negative.  Negative for dizziness and focal weakness.   Psychiatric/Behavioral: Negative.     All other systems reviewed and are negative.       Vital Signs  Temp:  [36.6 °C (97.9 °F)-37 °C (98.6 °F)] 36.6 °C (97.9 °F)  Pulse:  [81-99] 86  Resp:  [14-18] 18  BP: (145-159)/(83-94) 145/87  SpO2:  [90 %-91 %] 90 %    Physical Exam  Physical Exam  Vitals and nursing note reviewed.   Constitutional:       General: He is not in acute distress.     Appearance: Normal appearance.      Interventions: Nasal cannula in place.   HENT:      Head: Normocephalic and atraumatic.      Nose: Nose normal.      Mouth/Throat:      Mouth: Mucous membranes are moist.      Pharynx: Oropharynx is clear.   Eyes:      Extraocular Movements:      Right eye: No nystagmus.      Left eye: No nystagmus.   Cardiovascular:      Pulses: Normal pulses.      Heart sounds: No murmur heard.  Pulmonary:      Effort: Pulmonary effort is normal. No respiratory distress.   Chest:      Chest wall: No deformity, tenderness or crepitus.   Abdominal:      Palpations: Abdomen is soft.   Musculoskeletal:         General: Normal range of motion.      Cervical back: Full passive range of motion without pain.      Right lower leg:  No edema.      Left lower leg: No edema.   Skin:     General: Skin is warm and dry.      Capillary Refill: Capillary refill takes less than 2 seconds.      Comments: Abrasion to anterior left forearm.    Neurological:      Mental Status: He is alert and oriented to person, place, and time. Mental status is at baseline.      GCS: GCS eye subscore is 4. GCS verbal subscore is 5. GCS motor subscore is 6.      Sensory: Sensation is intact.      Motor: Motor function is intact.   Psychiatric:         Mood and Affect: Mood normal.         Laboratory  Recent Results (from the past 24 hour(s))   CBC with Differential: Tomorrow AM    Collection Time: 08/26/23  5:39 AM   Result Value Ref Range    WBC 9.9 4.8 - 10.8 K/uL    RBC 4.67 (L) 4.70 - 6.10 M/uL    Hemoglobin 14.3 14.0 - 18.0 g/dL    Hematocrit 42.8 42.0 - 52.0 %    MCV 91.6 81.4 - 97.8 fL    MCH 30.6 27.0 - 33.0 pg    MCHC 33.4 32.3 - 36.5 g/dL    RDW 42.2 35.9 - 50.0 fL    Platelet Count 164 164 - 446 K/uL    MPV 11.2 9.0 - 12.9 fL    Neutrophils-Polys 73.10 (H) 44.00 - 72.00 %    Lymphocytes 9.30 (L) 22.00 - 41.00 %    Monocytes 13.20 0.00 - 13.40 %    Eosinophils 3.40 0.00 - 6.90 %    Basophils 0.60 0.00 - 1.80 %    Immature Granulocytes 0.40 0.00 - 0.90 %    Nucleated RBC 0.00 0.00 - 0.20 /100 WBC    Neutrophils (Absolute) 7.23 1.82 - 7.42 K/uL    Lymphs (Absolute) 0.92 (L) 1.00 - 4.80 K/uL    Monos (Absolute) 1.31 (H) 0.00 - 0.85 K/uL    Eos (Absolute) 0.34 0.00 - 0.51 K/uL    Baso (Absolute) 0.06 0.00 - 0.12 K/uL    Immature Granulocytes (abs) 0.04 0.00 - 0.11 K/uL    NRBC (Absolute) 0.00 K/uL   Basic Metabolic Panel (BMP): Tomorrow AM    Collection Time: 08/26/23  5:39 AM   Result Value Ref Range    Sodium 142 135 - 145 mmol/L    Potassium 3.8 3.6 - 5.5 mmol/L    Chloride 106 96 - 112 mmol/L    Co2 25 20 - 33 mmol/L    Glucose 114 (H) 65 - 99 mg/dL    Bun 19 8 - 22 mg/dL    Creatinine 1.00 0.50 - 1.40 mg/dL    Calcium 8.4 (L) 8.5 - 10.5 mg/dL    Anion Gap 11.0  7.0 - 16.0   ESTIMATED GFR    Collection Time: 08/26/23  5:39 AM   Result Value Ref Range    GFR (CKD-EPI) 79 >60 mL/min/1.73 m 2       Fluids    Intake/Output Summary (Last 24 hours) at 8/26/2023 1230  Last data filed at 8/26/2023 0000  Gross per 24 hour   Intake --   Output 350 ml   Net -350 ml         Core Measures & Quality Metrics  Radiology images reviewed, Labs reviewed and Medications reviewed  Jang catheter: No Jang      DVT Prophylaxis: Enoxaparin (Lovenox)  DVT prophylaxis - mechanical: SCDs      Assessed for rehab: Patient was assess for and/or received rehabilitation services during this hospitalization    RAP Score Total: 4    CAGE Results: negative Blood Alcohol>0.08: no       Assessment/Plan  * Trauma- (present on admission)  Assessment & Plan  Frequent falls. Tripped and fell today. Unable to ambulate since fall.  Non Trauma Activation.  Susy Gee MD. Trauma Surgery.    Discharge planning issues  Assessment & Plan  Date of admission: 8/22/2023.  8/25 Rehab referral   Cleared for discharge: Yes - Date: 8/25.  Discharge delayed: No.  Discharge date: tbd.  Accepted to [facility], pending bed availability.       Multiple closed pelvic fractures without disruption of pelvic [place] (HCC)- (present on admission)  Assessment & Plan  Left inferior pubic ramus fracture.  Medial left acetabular fracture.  Non-operative management.  Weight bearing status - Touch toe weightbearing LLE x 4 weeks.   Romeo Bowman MD. Orthopedic Surgeon. [facility].     Closed fracture of one rib of left side- (present on admission)  Assessment & Plan  Anterior left 10th rib fracture.  CT of chest completed, no pneumothorax, confirms left anterolateral 10th rib fracture.   Aggressive multimodal pain management, and pulmonary hygiene. Serial chest radiographs.    Bilateral atelectasis- (present on admission)  Assessment & Plan  Bibasilar atelectasis, possible component of lower lobe infiltrates  Aggressive  pulmonary hygiene. Serial chest radiographs.    No contraindication to deep vein thrombosis (DVT) prophylaxis- (present on admission)  Assessment & Plan  Prophylactic dose enoxaparin 30 mg BID initiated upon admission.    Chronic compression fracture of L3 vertebra (HCC)- (present on admission)  Assessment & Plan  Anterior compression form body of L3, appears likely chronic.  CT of lumbar spine confirms L3 fracture is chronic. No acute fracture of lumbar spine.  Analgesia.    Ascending aortic aneurysm (HCC)- (present on admission)  Assessment & Plan  Incidental finding.  4.5 cm ascending thoracic aortic aneurysm, radiographic follow-up and stability recommended as clinically appropriate.        Discussed patient condition with RN, Patient, and trauma surgery .

## 2023-08-26 NOTE — CARE PLAN
Problem: Pain - Standard  Goal: Alleviation of pain or a reduction in pain to the patient’s comfort goal  Outcome: Progressing     Problem: Knowledge Deficit - Standard  Goal: Patient and family/care givers will demonstrate understanding of plan of care, disease process/condition, diagnostic tests and medications  Outcome: Progressing     Problem: Fall Risk  Goal: Patient will remain free from falls  Outcome: Progressing     Problem: Respiratory  Goal: Patient will achieve/maintain optimum respiratory ventilation and gas exchange  Outcome: Progressing   The patient is Stable - Low risk of patient condition declining or worsening    Shift Goals  Clinical Goals: Pain management  Patient Goals: Pain control, comfort  Family Goals: not present    Progress made toward(s) clinical / shift goals:  Patient resting in bed, bed is locked and in lowest position, call bell within reach of patient.    Patient is not progressing towards the following goals:

## 2023-08-26 NOTE — DISCHARGE PLANNING
Spoke with Elizabeth at [facility] they will accept patient on service. Per Elizabeth they have no transport . Call placed to [facility], spoke with Devante they have no transport left. RN DONALD Herron advised.

## 2023-08-26 NOTE — DISCHARGE PLANNING
"HTH/SCP TCN chart review completed. Collaborated with DONALD Herron. Noted per review that prior SW update at 1:50PM on 8/25, \" LSW left a voicemail for admissions with [facility] inquiring about bed availability. Awaiting response\". Updated CM/DPA re: situation who then reached out to [facility] for update regarding referral and note [facility] able to accept today. As [facility] and [facility] no longer have transport available, pt will require [facility] transport for dc today and discussed with DONALD as well. Current discharge considerations are now to dc to [facility] today at 1500 via [facility]. TCN will continue to follow and collaborate with discharge planning team as additional post acute needs arise. Thank you.    Completed:    PT/OT with recommendations for post acute placement on 8/24 and 8/25  Choice obtained: IRF, SNF, HH, DME (02/ADs); see above  [facility] referral not sent as anticipating placement as above  "

## 2023-09-25 ENCOUNTER — HOME HEALTH ADMISSION (OUTPATIENT)
Dept: HOME HEALTH SERVICES | Facility: HOME HEALTHCARE | Age: 73
End: 2023-09-25
Payer: MEDICARE

## 2023-10-03 ENCOUNTER — HOME CARE VISIT (OUTPATIENT)
Dept: HOME HEALTH SERVICES | Facility: HOME HEALTHCARE | Age: 73
End: 2023-10-03
Payer: MEDICARE

## 2023-10-03 VITALS
SYSTOLIC BLOOD PRESSURE: 124 MMHG | DIASTOLIC BLOOD PRESSURE: 72 MMHG | HEIGHT: 67 IN | OXYGEN SATURATION: 90 % | HEART RATE: 115 BPM | RESPIRATION RATE: 18 BRPM | TEMPERATURE: 98.3 F | BODY MASS INDEX: 28.31 KG/M2

## 2023-10-03 PROCEDURE — G0493 RN CARE EA 15 MIN HH/HOSPICE: HCPCS

## 2023-10-03 PROCEDURE — 665001 SOC-HOME HEALTH

## 2023-10-03 SDOH — ECONOMIC STABILITY: HOUSING INSECURITY: EVIDENCE OF SMOKING MATERIAL: 0

## 2023-10-03 ASSESSMENT — ENCOUNTER SYMPTOMS
SHORTNESS OF BREATH: 1
PAIN LOCATION - RELIEVING FACTORS: TYLENOL
PAIN LOCATION - PAIN FREQUENCY: CONSTANT
DYSPNEA ON EXERTION: 1
PAIN LOCATION - PAIN QUALITY: ACHE
PAIN LOCATION: LOWER BACK
PAIN: 1
FATIGUES EASILY: 1
COUGH CHARACTERISTICS: NON-PRODUCTIVE
PERSON REPORTING PAIN: PATIENT
COUGH: 1
DYSPNEA ACTIVITY LEVEL: AFTER AMBULATING 10 - 20 FT
LOWEST PAIN SEVERITY IN PAST 24 HOURS: 2/10
FATIGUE: 1
HIGHEST PAIN SEVERITY IN PAST 24 HOURS: 3/10
PAIN LOCATION - PAIN SEVERITY: 2/10
PAIN SEVERITY GOAL: 0/10
SUBJECTIVE PAIN PROGRESSION: GRADUALLY IMPROVING

## 2023-10-04 ENCOUNTER — HOME CARE VISIT (OUTPATIENT)
Dept: HOME HEALTH SERVICES | Facility: HOME HEALTHCARE | Age: 73
End: 2023-10-04
Payer: MEDICARE

## 2023-10-04 PROCEDURE — G0180 MD CERTIFICATION HHA PATIENT: HCPCS | Performed by: FAMILY MEDICINE

## 2023-10-04 NOTE — CASE COMMUNICATION
CAMACHO, PT called to schedule patient for PT evaluation on 10/4/23.  Scheduled evaluation on 10/5/23.  Patient reports he has not received equipment (shower chair and 4WW) which was ordered by care facility.  PT called and left message with [facility] discharge planner regarding patient's concern.

## 2023-10-05 ENCOUNTER — HOME CARE VISIT (OUTPATIENT)
Dept: HOME HEALTH SERVICES | Facility: HOME HEALTHCARE | Age: 73
End: 2023-10-05
Payer: MEDICARE

## 2023-10-05 VITALS
RESPIRATION RATE: 18 BRPM | TEMPERATURE: 97.9 F | HEART RATE: 82 BPM | DIASTOLIC BLOOD PRESSURE: 80 MMHG | SYSTOLIC BLOOD PRESSURE: 132 MMHG | OXYGEN SATURATION: 94 %

## 2023-10-05 PROCEDURE — G0151 HHCP-SERV OF PT,EA 15 MIN: HCPCS

## 2023-10-05 SDOH — ECONOMIC STABILITY: HOUSING INSECURITY
HOME SAFETY: PT NOTED CONCENTRATOR.  PATIENT REPORTS HE HAS NOT BEEN USING IT THE LAST SEVERAL DAYS AS HE DOES NOT NEED IT.  PT EDUCATED PATIENT THAT IT IS ON HIS LIST TO USE AT ALL TIMES PER DOCTOR.  PATIENT DECLINES NEED.  O2 SAT WAS 94% AT REST.  PT NOTIFIED T

## 2023-10-05 SDOH — ECONOMIC STABILITY: HOUSING INSECURITY: HOME SAFETY: EAM AND MD.

## 2023-10-05 ASSESSMENT — ENCOUNTER SYMPTOMS
PAIN: 1
PERSON REPORTING PAIN: PATIENT
LIMITED RANGE OF MOTION: 1
PAIN SEVERITY GOAL: 0/10
HIGHEST PAIN SEVERITY IN PAST 24 HOURS: 3/10
MUSCLE WEAKNESS: 1
LOWEST PAIN SEVERITY IN PAST 24 HOURS: 0/10
PAIN LOCATION: BACK
SUBJECTIVE PAIN PROGRESSION: GRADUALLY IMPROVING
PAIN LOCATION - RELIEVING FACTORS: REST, POSITIONING
PAIN LOCATION - EXACERBATING FACTORS: MOBILITY
PAIN LOCATION - PAIN DURATION: SINCE INJURY
ARTHRALGIAS: 1
PAIN LOCATION - PAIN QUALITY: ACHY
POOR JUDGMENT: 1
PAIN LOCATION - PAIN SEVERITY: 2/10
PAIN LOCATION - PAIN FREQUENCY: INTERMITTENT

## 2023-10-05 ASSESSMENT — ACTIVITIES OF DAILY LIVING (ADL)
AMBULATION ASSISTANCE ON FLAT SURFACES: 1
AMBULATION_DISTANCE/DURATION_TOLERATED: 5 FEET

## 2023-10-05 NOTE — CASE COMMUNICATION
PT evaluation completed on 10/5/23.  Frequency 1 week 1, 2 week 3 effective 10/5/23.  Please assist with authorization as needed.

## 2023-10-05 NOTE — CASE COMMUNICATION
fyi, patient states he has not been using supp O2 for the last few days as states he does not feel that he needs it.  Denies any SOB, dizziness.  All vitals within parameters at PT visit.  O2 was 94% at rest.

## 2023-10-05 NOTE — Clinical Note
noted   ----- Message -----  From: Candy Arauz, PT  Sent: 10/5/2023   2:46 PM PDT  To: Imelda Duval R.N.; Dinah Navarro; *      PT evaluation completed on 10/5/23.  Frequency 1 week 1, 2 week 3 effective 10/5/23.  Please assist with authorization as needed.

## 2023-10-06 ENCOUNTER — HOME CARE VISIT (OUTPATIENT)
Dept: HOME HEALTH SERVICES | Facility: HOME HEALTHCARE | Age: 73
End: 2023-10-06
Payer: MEDICARE

## 2023-10-06 VITALS
SYSTOLIC BLOOD PRESSURE: 139 MMHG | HEART RATE: 99 BPM | OXYGEN SATURATION: 94 % | RESPIRATION RATE: 16 BRPM | DIASTOLIC BLOOD PRESSURE: 81 MMHG | TEMPERATURE: 97.9 F

## 2023-10-06 PROCEDURE — G0152 HHCP-SERV OF OT,EA 15 MIN: HCPCS

## 2023-10-06 ASSESSMENT — ACTIVITIES OF DAILY LIVING (ADL)
DRESSING_UB_CURRENT_FUNCTION: INDEPENDENT
HOUSEKEEPING ASSESSED: 1
GROOMING_CURRENT_FUNCTION: INDEPENDENT
PREPARING MEALS: NEEDS ASSISTANCE
GROOMING ASSESSED: 1
LIGHT HOUSEKEEPING: NEEDS ASSISTANCE

## 2023-10-06 ASSESSMENT — ENCOUNTER SYMPTOMS
PAIN: 1
PAIN LOCATION: BACK
PAIN LOCATION - PAIN SEVERITY: 2/10

## 2023-10-09 ENCOUNTER — HOME CARE VISIT (OUTPATIENT)
Dept: HOME HEALTH SERVICES | Facility: HOME HEALTHCARE | Age: 73
End: 2023-10-09
Payer: MEDICARE

## 2023-10-09 ENCOUNTER — DOCUMENTATION (OUTPATIENT)
Dept: VASCULAR LAB | Facility: MEDICAL CENTER | Age: 73
End: 2023-10-09
Payer: MEDICARE

## 2023-10-09 NOTE — PROGRESS NOTES
Medication chart review for [facility] services    Received referral from [facility].   Medications reviewed  compared with discharge summary if available.  Discharge summary date, if applicable:   8/26    Current medication list per [facility]     Medication list one, patient is currently taking    Current Outpatient Medications:     Home Care Oxygen, 4 L/min, Inhalation, Continuous    acetaminophen, 650 mg, Oral, Q8HRS PRN    Glycerin-Polysorbate 80 (REFRESH DRY EYE THERAPY OP), 2 Drop, Both Eyes, QDAY PRN      Medication list two, drugs that the patient has been prescribed or recommended to take by their healthcare provider on discharge summary  START taking these medications         Instructions   acetaminophen 325 MG Tabs  Commonly known as: Tylenol    Take 2 Tablets by mouth every 6 hours.  Dose: 650 mg      celecoxib 100 MG Caps  Commonly known as: CeleBREX    Take 1 Capsule by mouth 2 times a day for 7 days.  Dose: 100 mg      enoxaparin 30 MG/0.3ML Sosy inj  Commonly known as: Lovenox    Inject 0.3 mL under the skin every 12 hours.  Dose: 30 mg      metaxalone 800 MG Tabs  Commonly known as: Skelaxin    Take 1 Tablet by mouth 3 times a day for 7 days.  Dose: 800 mg      oxyCODONE immediate-release 5 MG Tabs  Commonly known as: Roxicodone    Take 1 Tablet by mouth every four hours as needed for Severe Pain for up to 3 days.  Dose: 5 mg           Allergies   Allergen Reactions    Aspirin Unspecified     Worsening tinnitus  Other reaction(s): Other (See Comments)  TINNITUS      Codeine      Other reaction(s): Unknown       Labs     Lab Results   Component Value Date/Time    SODIUM 142 08/26/2023 05:39 AM    POTASSIUM 3.8 08/26/2023 05:39 AM    CHLORIDE 106 08/26/2023 05:39 AM    CO2 25 08/26/2023 05:39 AM    GLUCOSE 114 (H) 08/26/2023 05:39 AM    BUN 19 08/26/2023 05:39 AM    CREATININE 1.00 08/26/2023 05:39 AM     Lab Results   Component Value Date/Time    ALKPHOSPHAT 63 08/23/2023 12:10 AM     ASTSGOT 22 08/23/2023 12:10 AM    ALTSGPT 18 08/23/2023 12:10 AM    TBILIRUBIN 1.3 08/23/2023 12:10 AM    ALBUMIN 4.0 08/23/2023 12:10 AM        Assessment for clinically significant drug interactions, drug omissions/additions, duplicative therapies.            CC   Armaan Koch D.O.  [address]  [facility] [address]  Fax: [phone]    [facility]  Phone [phone] fax [phone]    This note was created using voice recognition software (Dragon). The accuracy of the dictation is limited by the abilities of the software. I have reviewed the note prior to signing, however some errors in grammar and context are still possible. If you have any questions related to this note please do not hesitate to contact our office.

## 2023-10-10 ENCOUNTER — HOME CARE VISIT (OUTPATIENT)
Dept: HOME HEALTH SERVICES | Facility: HOME HEALTHCARE | Age: 73
End: 2023-10-10
Payer: MEDICARE

## 2023-10-10 PROCEDURE — G0299 HHS/HOSPICE OF RN EA 15 MIN: HCPCS

## 2023-10-10 ASSESSMENT — ACTIVITIES OF DAILY LIVING (ADL): OASIS_M1830: 05

## 2023-10-10 NOTE — CASE COMMUNICATION
"I agree with these changes.   ----- Message -----  From: Lisa Waldrop R.N.  Sent: 10/10/2023  10:00 AM PDT  To: Trisha Gonzalez R.N.      Quality Review for SOC OASIS by KATE Waldrop, RN on  October 10, 2023     Edits completed by KATE Waldrop RN:  1.  and  diagnosis coding updated per chart review.  2. Changed  to 5 per OT note dated 10/6/23 \"Bathing not assessed d/t environmental limitations. Pt states he ha s ordered a shower chair.\"  changed to 10/6/23 per the collaboration convention  3. Changed  to 5 per narrative stating patient needs moderate assistance and a wheelchair for safet mobility.  4.  changed to 10/2/23, date of valid referral  5. Changed  to 4 as patient only wears oxygen at night  6.  changed oxygen to intermittent per chart review only wearing at night  7.  is 3 per ambulation status  8. Comp leted F2F information  9.  is 13 per care plan therapy sets."

## 2023-10-10 NOTE — CASE COMMUNICATION
"Quality Review for SOC OASIS by KATE Waldrop RN on  October 10, 2023     Edits completed by KATE Waldrop RN:  1.  and  diagnosis coding updated per chart review.  2. Changed  to 5 per OT note dated 10/6/23 \"Bathing not assessed d/t environmental limitations. Pt states he has ordered a shower chair.\"  changed to 10/6/23 per the collaboration convention  3. Changed  to 5 per narrative stating patient needs m oderate assistance and a wheelchair for safet mobility.  4.  changed to 10/2/23, date of valid referral  5. Changed  to 4 as patient only wears oxygen at night  6.  changed oxygen to intermittent per chart review only wearing at night  7.  is 3 per ambulation status  8. Completed F2F information  9.  is 13 per care plan therapy sets."

## 2023-10-11 ENCOUNTER — HOME CARE VISIT (OUTPATIENT)
Dept: HOME HEALTH SERVICES | Facility: HOME HEALTHCARE | Age: 73
End: 2023-10-11
Payer: MEDICARE

## 2023-10-11 VITALS
SYSTOLIC BLOOD PRESSURE: 110 MMHG | DIASTOLIC BLOOD PRESSURE: 80 MMHG | OXYGEN SATURATION: 95 % | HEART RATE: 82 BPM | RESPIRATION RATE: 18 BRPM | TEMPERATURE: 97.7 F

## 2023-10-11 PROCEDURE — G0152 HHCP-SERV OF OT,EA 15 MIN: HCPCS

## 2023-10-11 ASSESSMENT — ENCOUNTER SYMPTOMS
PAIN LOCATION: BACK
PAIN LOCATION - PAIN SEVERITY: 2/10

## 2023-10-12 ENCOUNTER — HOME CARE VISIT (OUTPATIENT)
Dept: HOME HEALTH SERVICES | Facility: HOME HEALTHCARE | Age: 73
End: 2023-10-12
Payer: MEDICARE

## 2023-10-12 VITALS
HEART RATE: 95 BPM | OXYGEN SATURATION: 99 % | RESPIRATION RATE: 16 BRPM | DIASTOLIC BLOOD PRESSURE: 76 MMHG | SYSTOLIC BLOOD PRESSURE: 138 MMHG | TEMPERATURE: 97.4 F

## 2023-10-12 VITALS
HEART RATE: 100 BPM | RESPIRATION RATE: 18 BRPM | OXYGEN SATURATION: 98 % | SYSTOLIC BLOOD PRESSURE: 114 MMHG | TEMPERATURE: 99.1 F | DIASTOLIC BLOOD PRESSURE: 76 MMHG

## 2023-10-12 PROCEDURE — G0151 HHCP-SERV OF PT,EA 15 MIN: HCPCS

## 2023-10-12 ASSESSMENT — SOCIAL DETERMINANTS OF HEALTH (SDOH): IS CONCERNED ABOUT INCOME: N

## 2023-10-12 ASSESSMENT — ENCOUNTER SYMPTOMS
PERSON REPORTING PAIN: PATIENT
PAIN LOCATION - PAIN SEVERITY: 2/10
PAIN LOCATION - PAIN FREQUENCY: INTERMITTENT
PAIN LOCATION - EXACERBATING FACTORS: PHYSICAL ACTIVITY
SUBJECTIVE PAIN PROGRESSION: UNCHANGED
LOWEST PAIN SEVERITY IN PAST 24 HOURS: 0/10
PAIN SEVERITY GOAL: 0/10
HIGHEST PAIN SEVERITY IN PAST 24 HOURS: 2/10
PAIN LOCATION - RELIEVING FACTORS: RESTING
PAIN LOCATION: BACK
HYPERTENSION: 1
ASSOCIATED SYMPTOMS: NO
LAST BOWEL MOVEMENT: 66757
PAIN: 1
PAIN LOCATION - PAIN QUALITY: ACHY
PAIN LOCATION - PAIN DURATION: WITH ACTIVITY

## 2023-10-12 ASSESSMENT — ACTIVITIES OF DAILY LIVING (ADL)
AMBULATION ASSISTANCE: MINIMUM ASSIST
AMBULATION ASSISTANCE: 1

## 2023-10-13 ENCOUNTER — HOME CARE VISIT (OUTPATIENT)
Dept: HOME HEALTH SERVICES | Facility: HOME HEALTHCARE | Age: 73
End: 2023-10-13
Payer: MEDICARE

## 2023-10-15 ENCOUNTER — APPOINTMENT (OUTPATIENT)
Dept: RADIOLOGY | Facility: MEDICAL CENTER | Age: 73
DRG: 871 | End: 2023-10-15
Attending: STUDENT IN AN ORGANIZED HEALTH CARE EDUCATION/TRAINING PROGRAM
Payer: MEDICARE

## 2023-10-15 ENCOUNTER — HOSPITAL ENCOUNTER (INPATIENT)
Facility: MEDICAL CENTER | Age: 73
LOS: 3 days | DRG: 871 | End: 2023-10-18
Attending: STUDENT IN AN ORGANIZED HEALTH CARE EDUCATION/TRAINING PROGRAM | Admitting: STUDENT IN AN ORGANIZED HEALTH CARE EDUCATION/TRAINING PROGRAM
Payer: MEDICARE

## 2023-10-15 DIAGNOSIS — R41.82 ALTERED MENTAL STATUS, UNSPECIFIED ALTERED MENTAL STATUS TYPE: ICD-10-CM

## 2023-10-15 DIAGNOSIS — J96.11 CHRONIC RESPIRATORY FAILURE WITH HYPOXIA (HCC): ICD-10-CM

## 2023-10-15 DIAGNOSIS — A41.9 SEPSIS, DUE TO UNSPECIFIED ORGANISM, UNSPECIFIED WHETHER ACUTE ORGAN DYSFUNCTION PRESENT (HCC): ICD-10-CM

## 2023-10-15 DIAGNOSIS — N39.0 URINARY TRACT INFECTION WITHOUT HEMATURIA, SITE UNSPECIFIED: ICD-10-CM

## 2023-10-15 PROBLEM — J96.01 ACUTE HYPOXIC RESPIRATORY FAILURE (HCC): Status: ACTIVE | Noted: 2023-10-15

## 2023-10-15 PROBLEM — E80.6 HYPERBILIRUBINEMIA: Status: ACTIVE | Noted: 2023-10-15

## 2023-10-15 PROBLEM — E83.39 HYPOPHOSPHATEMIA: Status: ACTIVE | Noted: 2023-10-15

## 2023-10-15 PROBLEM — R60.0 BILATERAL LOWER EXTREMITY EDEMA: Status: ACTIVE | Noted: 2023-10-15

## 2023-10-15 PROBLEM — N17.9 ACUTE KIDNEY INJURY (HCC): Status: ACTIVE | Noted: 2023-10-15

## 2023-10-15 PROBLEM — D64.9 NORMOCYTIC ANEMIA: Status: ACTIVE | Noted: 2023-10-15

## 2023-10-15 PROBLEM — G93.40 ACUTE ENCEPHALOPATHY: Status: ACTIVE | Noted: 2023-10-15

## 2023-10-15 LAB
ALBUMIN SERPL BCP-MCNC: 3.9 G/DL (ref 3.2–4.9)
ALBUMIN/GLOB SERPL: 1.3 G/DL
ALP SERPL-CCNC: 98 U/L (ref 30–99)
ALT SERPL-CCNC: 9 U/L (ref 2–50)
ANION GAP SERPL CALC-SCNC: 13 MMOL/L (ref 7–16)
APPEARANCE UR: CLEAR
AST SERPL-CCNC: 9 U/L (ref 12–45)
BACTERIA #/AREA URNS HPF: ABNORMAL /HPF
BASOPHILS # BLD AUTO: 0.3 % (ref 0–1.8)
BASOPHILS # BLD: 0.05 K/UL (ref 0–0.12)
BILIRUB SERPL-MCNC: 2.4 MG/DL (ref 0.1–1.5)
BILIRUB UR QL STRIP.AUTO: NEGATIVE
BUN SERPL-MCNC: 16 MG/DL (ref 8–22)
CALCIUM ALBUM COR SERPL-MCNC: 9.6 MG/DL (ref 8.5–10.5)
CALCIUM SERPL-MCNC: 9.5 MG/DL (ref 8.5–10.5)
CHLORIDE SERPL-SCNC: 103 MMOL/L (ref 96–112)
CO2 SERPL-SCNC: 25 MMOL/L (ref 20–33)
COLOR UR: YELLOW
CREAT SERPL-MCNC: 1.38 MG/DL (ref 0.5–1.4)
EOSINOPHIL # BLD AUTO: 0.01 K/UL (ref 0–0.51)
EOSINOPHIL NFR BLD: 0.1 % (ref 0–6.9)
EPI CELLS #/AREA URNS HPF: NEGATIVE /HPF
ERYTHROCYTE [DISTWIDTH] IN BLOOD BY AUTOMATED COUNT: 49.5 FL (ref 35.9–50)
FERRITIN SERPL-MCNC: 1178 NG/ML (ref 22–322)
FLUAV RNA SPEC QL NAA+PROBE: NEGATIVE
FLUBV RNA SPEC QL NAA+PROBE: NEGATIVE
GFR SERPLBLD CREATININE-BSD FMLA CKD-EPI: 53 ML/MIN/1.73 M 2
GLOBULIN SER CALC-MCNC: 3 G/DL (ref 1.9–3.5)
GLUCOSE SERPL-MCNC: 124 MG/DL (ref 65–99)
GLUCOSE UR STRIP.AUTO-MCNC: NEGATIVE MG/DL
HCT VFR BLD AUTO: 41.3 % (ref 42–52)
HGB BLD-MCNC: 13.2 G/DL (ref 14–18)
HGB RETIC QN AUTO: 32.4 PG/CELL (ref 29–35)
HYALINE CASTS #/AREA URNS LPF: ABNORMAL /LPF
IMM GRANULOCYTES # BLD AUTO: 0.12 K/UL (ref 0–0.11)
IMM GRANULOCYTES NFR BLD AUTO: 0.7 % (ref 0–0.9)
IMM RETICS NFR: 15.7 % (ref 2.6–16.1)
IRON SATN MFR SERPL: 7 % (ref 15–55)
IRON SERPL-MCNC: 14 UG/DL (ref 50–180)
KETONES UR STRIP.AUTO-MCNC: NEGATIVE MG/DL
LACTATE SERPL-SCNC: 1.7 MMOL/L (ref 0.5–2)
LEUKOCYTE ESTERASE UR QL STRIP.AUTO: ABNORMAL
LYMPHOCYTES # BLD AUTO: 0.73 K/UL (ref 1–4.8)
LYMPHOCYTES NFR BLD: 4.4 % (ref 22–41)
MAGNESIUM SERPL-MCNC: 2 MG/DL (ref 1.5–2.5)
MCH RBC QN AUTO: 29.3 PG (ref 27–33)
MCHC RBC AUTO-ENTMCNC: 32 G/DL (ref 32.3–36.5)
MCV RBC AUTO: 91.8 FL (ref 81.4–97.8)
MICRO URNS: ABNORMAL
MONOCYTES # BLD AUTO: 1.03 K/UL (ref 0–0.85)
MONOCYTES NFR BLD AUTO: 6.2 % (ref 0–13.4)
NEUTROPHILS # BLD AUTO: 14.6 K/UL (ref 1.82–7.42)
NEUTROPHILS NFR BLD: 88.3 % (ref 44–72)
NITRITE UR QL STRIP.AUTO: NEGATIVE
NRBC # BLD AUTO: 0 K/UL
NRBC BLD-RTO: 0 /100 WBC (ref 0–0.2)
PH UR STRIP.AUTO: 7.5 [PH] (ref 5–8)
PHOSPHATE SERPL-MCNC: 2.1 MG/DL (ref 2.5–4.5)
PLATELET # BLD AUTO: 287 K/UL (ref 164–446)
PMV BLD AUTO: 9.6 FL (ref 9–12.9)
POTASSIUM SERPL-SCNC: 4.1 MMOL/L (ref 3.6–5.5)
PROT SERPL-MCNC: 6.9 G/DL (ref 6–8.2)
PROT UR QL STRIP: 30 MG/DL
RBC # BLD AUTO: 4.5 M/UL (ref 4.7–6.1)
RBC # URNS HPF: ABNORMAL /HPF
RBC UR QL AUTO: ABNORMAL
RETICS # AUTO: 0.14 M/UL (ref 0.04–0.12)
RETICS/RBC NFR: 3.1 % (ref 0.8–2.6)
RSV RNA SPEC QL NAA+PROBE: NEGATIVE
SARS-COV-2 RNA RESP QL NAA+PROBE: NOTDETECTED
SODIUM SERPL-SCNC: 141 MMOL/L (ref 135–145)
SP GR UR STRIP.AUTO: 1.02
SPECIMEN SOURCE: NORMAL
TIBC SERPL-MCNC: 199 UG/DL (ref 250–450)
TSH SERPL DL<=0.005 MIU/L-ACNC: 0.96 UIU/ML (ref 0.38–5.33)
UIBC SERPL-MCNC: 185 UG/DL (ref 110–370)
UROBILINOGEN UR STRIP.AUTO-MCNC: 1 MG/DL
WBC # BLD AUTO: 16.5 K/UL (ref 4.8–10.8)
WBC #/AREA URNS HPF: ABNORMAL /HPF

## 2023-10-15 PROCEDURE — 87486 CHLMYD PNEUM DNA AMP PROBE: CPT

## 2023-10-15 PROCEDURE — 87798 DETECT AGENT NOS DNA AMP: CPT

## 2023-10-15 PROCEDURE — 82728 ASSAY OF FERRITIN: CPT

## 2023-10-15 PROCEDURE — 770006 HCHG ROOM/CARE - MED/SURG/GYN SEMI*

## 2023-10-15 PROCEDURE — 87086 URINE CULTURE/COLONY COUNT: CPT

## 2023-10-15 PROCEDURE — 83550 IRON BINDING TEST: CPT

## 2023-10-15 PROCEDURE — 36415 COLL VENOUS BLD VENIPUNCTURE: CPT

## 2023-10-15 PROCEDURE — 85025 COMPLETE CBC W/AUTO DIFF WBC: CPT

## 2023-10-15 PROCEDURE — 87077 CULTURE AEROBIC IDENTIFY: CPT

## 2023-10-15 PROCEDURE — 87186 SC STD MICRODIL/AGAR DIL: CPT

## 2023-10-15 PROCEDURE — 81001 URINALYSIS AUTO W/SCOPE: CPT

## 2023-10-15 PROCEDURE — C9803 HOPD COVID-19 SPEC COLLECT: HCPCS | Performed by: STUDENT IN AN ORGANIZED HEALTH CARE EDUCATION/TRAINING PROGRAM

## 2023-10-15 PROCEDURE — 87581 M.PNEUMON DNA AMP PROBE: CPT

## 2023-10-15 PROCEDURE — 80053 COMPREHEN METABOLIC PANEL: CPT

## 2023-10-15 PROCEDURE — 99223 1ST HOSP IP/OBS HIGH 75: CPT | Mod: AI,GC | Performed by: STUDENT IN AN ORGANIZED HEALTH CARE EDUCATION/TRAINING PROGRAM

## 2023-10-15 PROCEDURE — A9270 NON-COVERED ITEM OR SERVICE: HCPCS | Performed by: STUDENT IN AN ORGANIZED HEALTH CARE EDUCATION/TRAINING PROGRAM

## 2023-10-15 PROCEDURE — 700102 HCHG RX REV CODE 250 W/ 637 OVERRIDE(OP): Performed by: STUDENT IN AN ORGANIZED HEALTH CARE EDUCATION/TRAINING PROGRAM

## 2023-10-15 PROCEDURE — 0241U HCHG SARS-COV-2 COVID-19 NFCT DS RESP RNA 4 TRGT MIC: CPT

## 2023-10-15 PROCEDURE — 85046 RETICYTE/HGB CONCENTRATE: CPT

## 2023-10-15 PROCEDURE — 83540 ASSAY OF IRON: CPT

## 2023-10-15 PROCEDURE — 70450 CT HEAD/BRAIN W/O DYE: CPT

## 2023-10-15 PROCEDURE — 87040 BLOOD CULTURE FOR BACTERIA: CPT

## 2023-10-15 PROCEDURE — 700111 HCHG RX REV CODE 636 W/ 250 OVERRIDE (IP): Mod: JZ | Performed by: STUDENT IN AN ORGANIZED HEALTH CARE EDUCATION/TRAINING PROGRAM

## 2023-10-15 PROCEDURE — 87633 RESP VIRUS 12-25 TARGETS: CPT

## 2023-10-15 PROCEDURE — 84443 ASSAY THYROID STIM HORMONE: CPT

## 2023-10-15 PROCEDURE — 83735 ASSAY OF MAGNESIUM: CPT

## 2023-10-15 PROCEDURE — 700105 HCHG RX REV CODE 258: Performed by: STUDENT IN AN ORGANIZED HEALTH CARE EDUCATION/TRAINING PROGRAM

## 2023-10-15 PROCEDURE — 93005 ELECTROCARDIOGRAM TRACING: CPT | Performed by: STUDENT IN AN ORGANIZED HEALTH CARE EDUCATION/TRAINING PROGRAM

## 2023-10-15 PROCEDURE — 83605 ASSAY OF LACTIC ACID: CPT

## 2023-10-15 PROCEDURE — 96374 THER/PROPH/DIAG INJ IV PUSH: CPT

## 2023-10-15 PROCEDURE — 71045 X-RAY EXAM CHEST 1 VIEW: CPT

## 2023-10-15 PROCEDURE — 84100 ASSAY OF PHOSPHORUS: CPT

## 2023-10-15 PROCEDURE — 99285 EMERGENCY DEPT VISIT HI MDM: CPT

## 2023-10-15 RX ORDER — BISACODYL 10 MG
10 SUPPOSITORY, RECTAL RECTAL
Status: DISCONTINUED | OUTPATIENT
Start: 2023-10-15 | End: 2023-10-18 | Stop reason: HOSPADM

## 2023-10-15 RX ORDER — SODIUM CHLORIDE, SODIUM LACTATE, POTASSIUM CHLORIDE, AND CALCIUM CHLORIDE .6; .31; .03; .02 G/100ML; G/100ML; G/100ML; G/100ML
30 INJECTION, SOLUTION INTRAVENOUS ONCE
Status: COMPLETED | OUTPATIENT
Start: 2023-10-15 | End: 2023-10-15

## 2023-10-15 RX ORDER — POLYETHYLENE GLYCOL 3350 17 G/17G
1 POWDER, FOR SOLUTION ORAL
Status: DISCONTINUED | OUTPATIENT
Start: 2023-10-15 | End: 2023-10-18 | Stop reason: HOSPADM

## 2023-10-15 RX ORDER — CEFTRIAXONE 2 G/1
2000 INJECTION, POWDER, FOR SOLUTION INTRAMUSCULAR; INTRAVENOUS ONCE
Status: COMPLETED | OUTPATIENT
Start: 2023-10-15 | End: 2023-10-15

## 2023-10-15 RX ORDER — LABETALOL HYDROCHLORIDE 5 MG/ML
10 INJECTION, SOLUTION INTRAVENOUS EVERY 4 HOURS PRN
Status: DISCONTINUED | OUTPATIENT
Start: 2023-10-15 | End: 2023-10-18 | Stop reason: HOSPADM

## 2023-10-15 RX ORDER — ENOXAPARIN SODIUM 100 MG/ML
40 INJECTION SUBCUTANEOUS DAILY
Status: DISCONTINUED | OUTPATIENT
Start: 2023-10-15 | End: 2023-10-18 | Stop reason: HOSPADM

## 2023-10-15 RX ORDER — ACETAMINOPHEN 325 MG/1
650 TABLET ORAL ONCE
Status: COMPLETED | OUTPATIENT
Start: 2023-10-15 | End: 2023-10-15

## 2023-10-15 RX ORDER — AMOXICILLIN 250 MG
2 CAPSULE ORAL 2 TIMES DAILY
Status: DISCONTINUED | OUTPATIENT
Start: 2023-10-15 | End: 2023-10-18 | Stop reason: HOSPADM

## 2023-10-15 RX ORDER — ACETAMINOPHEN 325 MG/1
650 TABLET ORAL EVERY 6 HOURS PRN
Status: DISCONTINUED | OUTPATIENT
Start: 2023-10-15 | End: 2023-10-16

## 2023-10-15 RX ADMIN — ACETAMINOPHEN 650 MG: 325 TABLET, FILM COATED ORAL at 16:54

## 2023-10-15 RX ADMIN — ENOXAPARIN SODIUM 40 MG: 100 INJECTION SUBCUTANEOUS at 21:31

## 2023-10-15 RX ADMIN — SODIUM CHLORIDE, POTASSIUM CHLORIDE, SODIUM LACTATE AND CALCIUM CHLORIDE 2448 ML: 600; 310; 30; 20 INJECTION, SOLUTION INTRAVENOUS at 16:52

## 2023-10-15 RX ADMIN — DIBASIC SODIUM PHOSPHATE, MONOBASIC POTASSIUM PHOSPHATE AND MONOBASIC SODIUM PHOSPHATE 250 MG: 852; 155; 130 TABLET ORAL at 21:31

## 2023-10-15 RX ADMIN — ACETAMINOPHEN 650 MG: 325 TABLET, FILM COATED ORAL at 23:30

## 2023-10-15 RX ADMIN — DIBASIC SODIUM PHOSPHATE, MONOBASIC POTASSIUM PHOSPHATE AND MONOBASIC SODIUM PHOSPHATE 250 MG: 852; 155; 130 TABLET ORAL at 23:33

## 2023-10-15 RX ADMIN — CEFTRIAXONE SODIUM 2000 MG: 2 INJECTION, POWDER, FOR SOLUTION INTRAMUSCULAR; INTRAVENOUS at 16:54

## 2023-10-15 ASSESSMENT — LIFESTYLE VARIABLES
HAVE PEOPLE ANNOYED YOU BY CRITICIZING YOUR DRINKING: NO
ALCOHOL_USE: NO
EVER HAD A DRINK FIRST THING IN THE MORNING TO STEADY YOUR NERVES TO GET RID OF A HANGOVER: NO
EVER FELT BAD OR GUILTY ABOUT YOUR DRINKING: NO
HOW MANY TIMES IN THE PAST YEAR HAVE YOU HAD 5 OR MORE DRINKS IN A DAY: 0
CONSUMPTION TOTAL: NEGATIVE
TOTAL SCORE: 0
AVERAGE NUMBER OF DAYS PER WEEK YOU HAVE A DRINK CONTAINING ALCOHOL: 0
TOTAL SCORE: 0
HAVE YOU EVER FELT YOU SHOULD CUT DOWN ON YOUR DRINKING: NO
ON A TYPICAL DAY WHEN YOU DRINK ALCOHOL HOW MANY DRINKS DO YOU HAVE: 0
TOTAL SCORE: 0

## 2023-10-15 ASSESSMENT — COGNITIVE AND FUNCTIONAL STATUS - GENERAL
SUGGESTED CMS G CODE MODIFIER DAILY ACTIVITY: CJ
DAILY ACTIVITIY SCORE: 20
DRESSING REGULAR UPPER BODY CLOTHING: A LITTLE
TOILETING: A LITTLE
MOVING FROM LYING ON BACK TO SITTING ON SIDE OF FLAT BED: A LITTLE
MOVING TO AND FROM BED TO CHAIR: A LITTLE
WALKING IN HOSPITAL ROOM: A LOT
DRESSING REGULAR LOWER BODY CLOTHING: A LITTLE
CLIMB 3 TO 5 STEPS WITH RAILING: A LOT
MOBILITY SCORE: 17
HELP NEEDED FOR BATHING: A LITTLE
SUGGESTED CMS G CODE MODIFIER MOBILITY: CK
STANDING UP FROM CHAIR USING ARMS: A LITTLE

## 2023-10-15 ASSESSMENT — ENCOUNTER SYMPTOMS
CONSTIPATION: 0
NECK PAIN: 0
SHORTNESS OF BREATH: 0
HEADACHES: 0
SENSORY CHANGE: 0
DIZZINESS: 0
WEAKNESS: 0
NAUSEA: 0
ABDOMINAL PAIN: 0
SPEECH CHANGE: 0
BLOOD IN STOOL: 0
DIAPHORESIS: 0
SEIZURES: 0
PALPITATIONS: 0
HEMOPTYSIS: 0
VOMITING: 1
BACK PAIN: 0
FEVER: 1
DIARRHEA: 0
CHILLS: 1
COUGH: 0

## 2023-10-15 ASSESSMENT — FIBROSIS 4 INDEX: FIB4 SCORE: 2.34

## 2023-10-15 NOTE — ED PROVIDER NOTES
ED Provider Note    CHIEF COMPLAINT  Chief Complaint   Patient presents with    Fever     TMAX 102.5 F    ALOC     Per family       EXTERNAL RECORDS REVIEWED  Outpatient Notes patient was seen by geriatrician on 10/4/2023.  Noted history of multiple closed pelvic fractures and a rib fracture sustained via a mechanical ground-level fall at the end of August.     HPI/ROS  LIMITATION TO HISTORY   Select: Altered mental status / Confusion  OUTSIDE HISTORIAN(S):  EMS who report that the patient's family found him altered at home this afternoon prior to arrival.    Ramesh Manjarrez is a 73 y.o. male who presents to the emergency department for evaluation of altered mental status and a fever.  As above EMS reported that the patient's family contacted them due to concerns that the patient was altered above baseline at home and they noted that he felt quite warm.  He was febrile on EMS arrival.  Currently the patient is disoriented and cannot provide additional history.    PAST MEDICAL HISTORY   has a past medical history of BPH with elevated PSA and Tinnitus, bilateral.    SURGICAL HISTORY   has a past surgical history that includes prostate needle biopsy and inguinal hernia repair (Left).    FAMILY HISTORY  Family History   Problem Relation Age of Onset    Other Mother         Liver disease    Diabetes Father     Heart Attack Father         77       SOCIAL HISTORY  Social History     Tobacco Use    Smoking status: Former     Current packs/day: 0.00     Average packs/day: 1 pack/day for 10.0 years (10.0 ttl pk-yrs)     Types: Cigarettes     Start date:      Quit date:      Years since quittin.8    Smokeless tobacco: Never   Vaping Use    Vaping Use: Never used   Substance and Sexual Activity    Alcohol use: Yes     Comment: 1 drink a month    Drug use: Never    Sexual activity: Not Currently       CURRENT MEDICATIONS  Home Medications    **Home medications have not yet been reviewed for this encounter**    "      ALLERGIES  Allergies   Allergen Reactions    Aspirin Unspecified     Worsening tinnitus  Other reaction(s): Other (See Comments)  TINNITUS      Codeine      Other reaction(s): Unknown       PHYSICAL EXAM  VITAL SIGNS: BP (!) 140/67   Pulse (!) 121   Temp (!) 38.4 °C (101.2 °F) (Temporal)   Resp (!) 29   Ht 1.702 m (5' 7\")   Wt 81.6 kg (180 lb)   SpO2 95%   BMI 28.19 kg/m²    Constitutional: No acute distress, diaphoretic  HEENT: Atraumatic, normocephalic, pupils are equal round reactive to light, nose normal, mouth shows dry mucous membranes  Neck: Supple, no JVD, no tracheal deviation  Cardiovascular: Tachycardic, regular rhythm, no murmur, rub or gallop, 2+ pulses peripherally x4  Thorax & Lungs: No respiratory distress, no wheezes, rales or rhonchi, no chest wall tenderness.  GI: Soft, non-distended, non-tender, no rebound  : Normal external male genitalia.  Normal perineum.  Skin: Warm, dry, no acute rash or lesion  Musculoskeletal: Moving all extremities, no acute deformity, 2+ bilateral lower extremity edema, no tenderness  Neurologic: A&Ox1, cranial nerves II through XII grossly intact, moving all extremities with no focal deficits.  Intermittently following commands.  Psychiatric: Appropriate affect for situation at this time      DIAGNOSTIC STUDIES / PROCEDURES    LABS  Labs Reviewed   CBC WITH DIFFERENTIAL - Abnormal; Notable for the following components:       Result Value    WBC 16.5 (*)     RBC 4.50 (*)     Hemoglobin 13.2 (*)     Hematocrit 41.3 (*)     MCHC 32.0 (*)     Neutrophils-Polys 88.30 (*)     Lymphocytes 4.40 (*)     Neutrophils (Absolute) 14.60 (*)     Lymphs (Absolute) 0.73 (*)     Monos (Absolute) 1.03 (*)     Immature Granulocytes (abs) 0.12 (*)     All other components within normal limits   COMP METABOLIC PANEL - Abnormal; Notable for the following components:    Glucose 124 (*)     AST(SGOT) 9 (*)     Total Bilirubin 2.4 (*)     All other components within normal limits "   URINALYSIS - Abnormal; Notable for the following components:    Protein 30 (*)     Leukocyte Esterase Moderate (*)     Occult Blood Trace (*)     All other components within normal limits    Narrative:     Release to patient->Immediate  Indication for culture:->Evaluation for sepsis without a  clear source of infection   ESTIMATED GFR - Abnormal; Notable for the following components:    GFR (CKD-EPI) 53 (*)     All other components within normal limits   URINE MICROSCOPIC (W/UA) - Abnormal; Notable for the following components:    WBC 10-20 (*)     RBC 0-2 (*)     Bacteria Few (*)     All other components within normal limits    Narrative:     Release to patient->Immediate  Indication for culture:->Evaluation for sepsis without a  clear source of infection   LACTIC ACID   COV-2, FLU A/B, AND RSV BY PCR ([facility]rack[facility]ID)    Narrative:     Release to patient->Immediate   TSH WITH REFLEX TO FT4   URINE CULTURE(NEW)    Narrative:     Release to patient->Immediate  Indication for culture:->Evaluation for sepsis without a  clear source of infection   BLOOD CULTURE    Narrative:     Blood Cultures X2. Draw one blood culture from central line  (including implanted port) and one blood culture  peripherally. If no central line present draw blood cultures  times two peripherally from different sites  Release to patient->Immediate   BLOOD CULTURE    Narrative:     Blood Cultures X2. Draw one blood culture from central line  (including implanted port) and one blood culture  peripherally. If no central line present draw blood cultures  times two peripherally from different sites  Release to patient->Immediate   MAGNESIUM         RADIOLOGY  I have independently interpreted the diagnostic imaging associated with this visit and am waiting the final reading from the radiologist.   My preliminary interpretation is as follows: No acute intracranial hemorrhage  Radiologist interpretation:   CT-HEAD W/O   Final Result      1.  Diffuse atrophy  and white matter changes.   2.  No acute intracranial hemorrhage or territorial infarct.            DX-CHEST-PORTABLE (1 VIEW)   Final Result      Bibasilar atelectasis. No focal consolidation. No pleural effusions.               COURSE & MEDICAL DECISION MAKING    ED Observation Status? No; Patient does not meet criteria for ED Observation.     INITIAL ASSESSMENT, COURSE AND PLAN  Care Narrative:     Patient arrives emergency department for evaluation of altered mental status and fever.  EMS called by family prior to arrival.  Despite multiple attempts no contact with patient's family can be made.  Patient is disoriented unable to provide history.  Extensive physical examination does not reveal any obvious source of infection with no obvious skin lesions, evidence of cellulitis, abscess.  Chest x-ray with no focal consolidation concerning for pneumonia.  Abdomen is nontender which argues against serious intra-abdominal infection.  CT head was obtained given altered mental status and does not demonstrate any acute intracranial hemorrhage or significant additional acute abnormality.  Patient's work-up was otherwise remarkable for a leukocytosis with a left shift and a urinalysis with moderate leukocyte esterase, 10-20 white blood cells and few bacteria without epithelial cells.  COVID test negative.  At this point urinary tract infection is most likely etiology of altered mental status and sepsis.  Broad-spectrum antibiotics were provided with ceftriaxone.  20 cc/kg of IV fluid was administered for significant tachycardia with improvement in tachycardia and clinical status.  Overall patient warrants admission to the hospital for further management, awaiting results of blood cultures, antibiotic therapy and mental status reevaluation.  Case was discussed with Dr. Martin.  Admitting hospitalist.    CRITICAL CARE  The very real possibilty of a deterioration of this patient's condition required the highest level of my  preparedness for sudden, emergent intervention.  I provided critical care services, which included medication orders, frequent reevaluations of the patient's condition and response to treatment, ordering and reviewing test results, and discussing the case with various consultants.  The critical care time associated with the care of the patient was 30 minutes. Review chart for interventions. This time is exclusive of any other billable procedures.       HYDRATION: Based on the patient's presentation of Sepsis the patient was given IV fluids. IV Hydration was used because oral hydration was not adequate alone. Upon recheck following hydration, the patient was improved.      ADDITIONAL PROBLEM LIST  UTI    DISPOSITION AND DISCUSSIONS  I have discussed management of the patient with the following physicians and RICKY's: Family medicine resident working with Dr. Martin, Hospitalist    Discussion of management with other [facility] or appropriate source(s): None       FINAL DIAGNOSIS  1. Sepsis, due to unspecified organism, unspecified whether acute organ dysfunction present (HCC)    2. Altered mental status, unspecified altered mental status type    3.  Critical care time of 30 minutes       Electronically signed by: Luke Ruiz M.D., 10/15/2023 4:33 PM

## 2023-10-15 NOTE — ED TRIAGE NOTES
"Chief Complaint   Patient presents with    Fever     TMAX 102.5 F    ALOC     Per family     Pt BIBA from home after c/o NVD, fever, generalized weakness and ALOC. Family left house around 12 today and upon return family noticed patient confused and not at baseline.  TMAX 102.5 F. No meds taken PTA. COVID 3 wks ago per family.  Pt 88% RA, pt placed 2 L NC    Blood Pressure : (!) 167/76, Pulse: (!) 109, Respiration: (!) 24, Temperature: (!) 39.1 °C (102.3 °F), Height: 170.2 cm (5' 7\"), Weight: 81.6 kg (180 lb), BMI (Calculated): 28.19, BSA (Calculated): 2, Pulse Oximetry: 90 % (pt placed on 2 L), O2 (LPM): 0, O2 Delivery Device: None - Room Air    "

## 2023-10-16 ENCOUNTER — HOME CARE VISIT (OUTPATIENT)
Dept: HOME HEALTH SERVICES | Facility: HOME HEALTHCARE | Age: 73
End: 2023-10-16
Payer: MEDICARE

## 2023-10-16 PROBLEM — N39.0 UTI (URINARY TRACT INFECTION): Status: ACTIVE | Noted: 2023-10-16

## 2023-10-16 LAB
ALBUMIN SERPL BCP-MCNC: 3.3 G/DL (ref 3.2–4.9)
ALBUMIN/GLOB SERPL: 1 G/DL
ALP SERPL-CCNC: 92 U/L (ref 30–99)
ALT SERPL-CCNC: 9 U/L (ref 2–50)
ANION GAP SERPL CALC-SCNC: 14 MMOL/L (ref 7–16)
AST SERPL-CCNC: 20 U/L (ref 12–45)
B PARAP IS1001 DNA NPH QL NAA+NON-PROBE: NOT DETECTED
B PERT.PT PRMT NPH QL NAA+NON-PROBE: NOT DETECTED
BASOPHILS # BLD AUTO: 0.2 % (ref 0–1.8)
BASOPHILS # BLD: 0.04 K/UL (ref 0–0.12)
BILIRUB SERPL-MCNC: 1.4 MG/DL (ref 0.1–1.5)
BUN SERPL-MCNC: 14 MG/DL (ref 8–22)
C PNEUM DNA NPH QL NAA+NON-PROBE: NOT DETECTED
CALCIUM ALBUM COR SERPL-MCNC: 9.9 MG/DL (ref 8.5–10.5)
CALCIUM SERPL-MCNC: 9.3 MG/DL (ref 8.5–10.5)
CHLORIDE SERPL-SCNC: 103 MMOL/L (ref 96–112)
CO2 SERPL-SCNC: 21 MMOL/L (ref 20–33)
CREAT SERPL-MCNC: 1.25 MG/DL (ref 0.5–1.4)
EOSINOPHIL # BLD AUTO: 0 K/UL (ref 0–0.51)
EOSINOPHIL NFR BLD: 0 % (ref 0–6.9)
ERYTHROCYTE [DISTWIDTH] IN BLOOD BY AUTOMATED COUNT: 49.1 FL (ref 35.9–50)
FLUAV RNA NPH QL NAA+NON-PROBE: NOT DETECTED
FLUBV RNA NPH QL NAA+NON-PROBE: NOT DETECTED
GFR SERPLBLD CREATININE-BSD FMLA CKD-EPI: 60 ML/MIN/1.73 M 2
GLOBULIN SER CALC-MCNC: 3.2 G/DL (ref 1.9–3.5)
GLUCOSE SERPL-MCNC: 109 MG/DL (ref 65–99)
HADV DNA NPH QL NAA+NON-PROBE: NOT DETECTED
HCOV 229E RNA NPH QL NAA+NON-PROBE: NOT DETECTED
HCOV HKU1 RNA NPH QL NAA+NON-PROBE: NOT DETECTED
HCOV NL63 RNA NPH QL NAA+NON-PROBE: NOT DETECTED
HCOV OC43 RNA NPH QL NAA+NON-PROBE: NOT DETECTED
HCT VFR BLD AUTO: 34.3 % (ref 42–52)
HGB BLD-MCNC: 11.2 G/DL (ref 14–18)
HMPV RNA NPH QL NAA+NON-PROBE: NOT DETECTED
HPIV1 RNA NPH QL NAA+NON-PROBE: NOT DETECTED
HPIV2 RNA NPH QL NAA+NON-PROBE: NOT DETECTED
HPIV3 RNA NPH QL NAA+NON-PROBE: NOT DETECTED
HPIV4 RNA NPH QL NAA+NON-PROBE: NOT DETECTED
IMM GRANULOCYTES # BLD AUTO: 0.09 K/UL (ref 0–0.11)
IMM GRANULOCYTES NFR BLD AUTO: 0.5 % (ref 0–0.9)
LYMPHOCYTES # BLD AUTO: 0.87 K/UL (ref 1–4.8)
LYMPHOCYTES NFR BLD: 5 % (ref 22–41)
M PNEUMO DNA NPH QL NAA+NON-PROBE: NOT DETECTED
MAGNESIUM SERPL-MCNC: 2 MG/DL (ref 1.5–2.5)
MCH RBC QN AUTO: 29.9 PG (ref 27–33)
MCHC RBC AUTO-ENTMCNC: 32.7 G/DL (ref 32.3–36.5)
MCV RBC AUTO: 91.5 FL (ref 81.4–97.8)
MONOCYTES # BLD AUTO: 1.7 K/UL (ref 0–0.85)
MONOCYTES NFR BLD AUTO: 9.8 % (ref 0–13.4)
NEUTROPHILS # BLD AUTO: 14.63 K/UL (ref 1.82–7.42)
NEUTROPHILS NFR BLD: 84.5 % (ref 44–72)
NRBC # BLD AUTO: 0 K/UL
NRBC BLD-RTO: 0 /100 WBC (ref 0–0.2)
PLATELET # BLD AUTO: 234 K/UL (ref 164–446)
PMV BLD AUTO: 10.2 FL (ref 9–12.9)
POTASSIUM SERPL-SCNC: 4.2 MMOL/L (ref 3.6–5.5)
PROT SERPL-MCNC: 6.5 G/DL (ref 6–8.2)
RBC # BLD AUTO: 3.75 M/UL (ref 4.7–6.1)
RSV RNA NPH QL NAA+NON-PROBE: NOT DETECTED
RV+EV RNA NPH QL NAA+NON-PROBE: NOT DETECTED
SARS-COV-2 RNA NPH QL NAA+NON-PROBE: NOTDETECTED
SODIUM SERPL-SCNC: 138 MMOL/L (ref 135–145)
WBC # BLD AUTO: 17.3 K/UL (ref 4.8–10.8)

## 2023-10-16 PROCEDURE — A9270 NON-COVERED ITEM OR SERVICE: HCPCS | Performed by: STUDENT IN AN ORGANIZED HEALTH CARE EDUCATION/TRAINING PROGRAM

## 2023-10-16 PROCEDURE — 700111 HCHG RX REV CODE 636 W/ 250 OVERRIDE (IP): Performed by: STUDENT IN AN ORGANIZED HEALTH CARE EDUCATION/TRAINING PROGRAM

## 2023-10-16 PROCEDURE — 700102 HCHG RX REV CODE 250 W/ 637 OVERRIDE(OP): Performed by: STUDENT IN AN ORGANIZED HEALTH CARE EDUCATION/TRAINING PROGRAM

## 2023-10-16 PROCEDURE — 99233 SBSQ HOSP IP/OBS HIGH 50: CPT | Performed by: STUDENT IN AN ORGANIZED HEALTH CARE EDUCATION/TRAINING PROGRAM

## 2023-10-16 PROCEDURE — 700105 HCHG RX REV CODE 258: Performed by: STUDENT IN AN ORGANIZED HEALTH CARE EDUCATION/TRAINING PROGRAM

## 2023-10-16 PROCEDURE — 700101 HCHG RX REV CODE 250: Performed by: STUDENT IN AN ORGANIZED HEALTH CARE EDUCATION/TRAINING PROGRAM

## 2023-10-16 PROCEDURE — 770006 HCHG ROOM/CARE - MED/SURG/GYN SEMI*

## 2023-10-16 PROCEDURE — 83735 ASSAY OF MAGNESIUM: CPT

## 2023-10-16 PROCEDURE — 36415 COLL VENOUS BLD VENIPUNCTURE: CPT

## 2023-10-16 PROCEDURE — 80053 COMPREHEN METABOLIC PANEL: CPT

## 2023-10-16 PROCEDURE — 85025 COMPLETE CBC W/AUTO DIFF WBC: CPT

## 2023-10-16 RX ORDER — SODIUM CHLORIDE, SODIUM LACTATE, POTASSIUM CHLORIDE, CALCIUM CHLORIDE 600; 310; 30; 20 MG/100ML; MG/100ML; MG/100ML; MG/100ML
INJECTION, SOLUTION INTRAVENOUS CONTINUOUS
Status: DISCONTINUED | OUTPATIENT
Start: 2023-10-16 | End: 2023-10-17

## 2023-10-16 RX ORDER — ACETAMINOPHEN 325 MG/1
650 TABLET ORAL EVERY 4 HOURS PRN
Status: DISCONTINUED | OUTPATIENT
Start: 2023-10-16 | End: 2023-10-18 | Stop reason: HOSPADM

## 2023-10-16 RX ADMIN — SENNOSIDES AND DOCUSATE SODIUM 2 TABLET: 50; 8.6 TABLET ORAL at 16:46

## 2023-10-16 RX ADMIN — SODIUM CHLORIDE, POTASSIUM CHLORIDE, SODIUM LACTATE AND CALCIUM CHLORIDE: 600; 310; 30; 20 INJECTION, SOLUTION INTRAVENOUS at 12:05

## 2023-10-16 RX ADMIN — ACETAMINOPHEN 650 MG: 325 TABLET, FILM COATED ORAL at 05:17

## 2023-10-16 RX ADMIN — ENOXAPARIN SODIUM 40 MG: 100 INJECTION SUBCUTANEOUS at 16:47

## 2023-10-16 RX ADMIN — DIBASIC SODIUM PHOSPHATE, MONOBASIC POTASSIUM PHOSPHATE AND MONOBASIC SODIUM PHOSPHATE 250 MG: 852; 155; 130 TABLET ORAL at 16:46

## 2023-10-16 RX ADMIN — CEFTRIAXONE SODIUM 2000 MG: 10 INJECTION, POWDER, FOR SOLUTION INTRAVENOUS at 16:49

## 2023-10-16 RX ADMIN — DIBASIC SODIUM PHOSPHATE, MONOBASIC POTASSIUM PHOSPHATE AND MONOBASIC SODIUM PHOSPHATE 250 MG: 852; 155; 130 TABLET ORAL at 11:54

## 2023-10-16 RX ADMIN — DIBASIC SODIUM PHOSPHATE, MONOBASIC POTASSIUM PHOSPHATE AND MONOBASIC SODIUM PHOSPHATE 250 MG: 852; 155; 130 TABLET ORAL at 05:18

## 2023-10-16 RX ADMIN — ACETAMINOPHEN 650 MG: 325 TABLET, FILM COATED ORAL at 13:21

## 2023-10-16 ASSESSMENT — PAIN DESCRIPTION - PAIN TYPE: TYPE: ACUTE PAIN

## 2023-10-16 ASSESSMENT — ENCOUNTER SYMPTOMS
FEVER: 1
CHILLS: 1
WEAKNESS: 1

## 2023-10-16 NOTE — ASSESSMENT & PLAN NOTE
-Likely toxic metabolic encephalopathy due to sepsis.  Head CT negative.  -Improving.  -Continue management of UTI.  -Frequent re-orientation, reestablish circadian rhythm, encourage familiar faces/family in room, avoid or minimize narcotics/sedatives. Minimize tethering (IV lines, monitors, catheters).

## 2023-10-16 NOTE — PROGRESS NOTES
pt temp 39.1. pt temp came down to 38.6 after tylenol. MD Covington informed of pt temp and asked if he wanted to order anything else at this time. MD states no new orders

## 2023-10-16 NOTE — ASSESSMENT & PLAN NOTE
-Hemoglobin remained stable.  No signs of active bleeding.  Iron profile consistent with anemia of chronic disease, with low iron and high ferritin.    -Continue to monitor hemoglobin.  Restrictive transfusion strategy.

## 2023-10-16 NOTE — ASSESSMENT & PLAN NOTE
-Urine culture growing Enterococcus faecalis. Change antibiotics to IV ampicillin for better coverage. Trend  WBC count, would want to see further improvement and resolution of leukocytosis.

## 2023-10-16 NOTE — ASSESSMENT & PLAN NOTE
Likely 2/2 debility in setting of recent pelvic/hip fracture. R>L.    -Can consider TTE and/or DVT US

## 2023-10-16 NOTE — PROGRESS NOTES
4 Eyes Skin Assessment Completed by DEEP thompson and DEEP brooks.    Head WDL  Ears WDL  Nose WDL  Mouth WDL  Neck WDL  Breast/Chest WDL  Shoulder Blades WDL  Spine WDL  (R) Arm/Elbow/Hand WDL  (L) Arm/Elbow/Hand WDL  Abdomen WDL  Groin WDL  Scrotum/Coccyx/Buttocks WDL  (R) Leg WDL  (L) Leg WDL  (R) Heel/Foot/Toe WDL  (L) Heel/Foot/Toe WDL          Devices In Places       Interventions In Place Gray Ear Foams    Possible Skin Injury No    Pictures Uploaded Into Epic N/A  Wound Consult Placed N/A  RN Wound Prevention Protocol Ordered No

## 2023-10-16 NOTE — CARE PLAN
The patient is Stable - Low risk of patient condition declining or worsening    Shift Goals  Clinical Goals: Patient fever will lower down t/0 the shift  Patient Goals: rest    Progress made toward(s) clinical / shift goals:  Patient still having fever. PRN meds given. Will continue to monitor patient.   Patient is not progressing towards the following goals:

## 2023-10-16 NOTE — ASSESSMENT & PLAN NOTE
-This is Sepsis Present on admission  -SIRS criteria identified on my evaluation include: Fever, with temperature greater than 100.9 deg F, Tachycardia, with heart rate greater than 90 BPM and Leukocytosis, with WBC greater than 12,000  -Clinical indicators of end organ dysfunction include Toxic Metabolic Encephalopathy  -Source is likely urine.  Continue antibiotics.  Follow cultures.  -Continue sepsis protocol.  Off IV fluids, lactate has normalized.  -Reassessment: I have reassessed the patient's hemodynamic status.  Continue close hemodynamic monitoring.

## 2023-10-16 NOTE — ASSESSMENT & PLAN NOTE
-Likely due to bibasilar atelectasis complicated by sepsis.  Requiring 1 L of oxygen by nasal cannula.  -Continue to wean off oxygen.  Keep saturations above 88%.  Stop IV fluids.  -Continue incentive spirometry.

## 2023-10-16 NOTE — ASSESSMENT & PLAN NOTE
-Likely due to hypoperfusion in the setting of sepsis. T.bili 2.4, up from 1.3 in August. Denies abdominal pain.  -Resolved

## 2023-10-16 NOTE — ASSESSMENT & PLAN NOTE
-Likely due to hypoperfusion in the setting of sepsis. Cr on admission was 1.38, baseline 1.0.  -Improved.  Renal function back to baseline.  - avoid nephrotoxins, and continue to renally dose all medications.

## 2023-10-16 NOTE — PROGRESS NOTES
Hospital Medicine Daily Progress Note    Date of Service  10/16/2023    Chief Complaint  Ramesh Manjarrez is a 73 y.o. male admitted 10/15/2023 with confusion    Hospital Course  72yo male with PMH of BPH that presents after found by son in their room with AMS. Of note patient was recently admitted 08/22/23 to 08/26/23 after GLC with L inferior pubic ramus fx and L medial acetabular fx with non-operative management.  In the ED, patient was noted to be febrile, UA pos UTI. COVID/flu/RSV neg. CT head w/o without acute abnormality. CXR showing bibasilar atelectasis.     Interval Problem Update  Seen patient at bedside  AO x3  Still febrile, Temp 100.7 today  On 2L O2  Continue iv ceftriaxone  Follow blood and urine culture  Lactic acid normal  Start IVF    I have discussed this patient's plan of care and discharge plan at IDT rounds today with Case Management, Nursing, Nursing leadership, and other members of the IDT team.    Consultants/Specialty  na    Code Status  Full Code    Disposition  The patient is not medically cleared for discharge to home or a post-acute facility.      I have placed the appropriate orders for post-discharge needs.    Review of Systems  Review of Systems   Constitutional:  Positive for chills, fever and malaise/fatigue.   Neurological:  Positive for weakness.   All other systems reviewed and are negative.       Physical Exam  Temp:  [36.1 °C (97 °F)-39.4 °C (103 °F)] 38.2 °C (100.7 °F)  Pulse:  [] 93  Resp:  [18-32] 18  BP: (104-175)/() 118/66  SpO2:  [90 %-99 %] 94 %    Physical Exam  Vitals and nursing note reviewed.   Constitutional:       Appearance: Normal appearance. He is ill-appearing.   HENT:      Head: Normocephalic and atraumatic.      Mouth/Throat:      Pharynx: Oropharynx is clear.   Eyes:      Pupils: Pupils are equal, round, and reactive to light.   Neck:      Vascular: No carotid bruit.   Cardiovascular:      Rate and Rhythm: Normal rate and regular rhythm.    Pulmonary:      Effort: Pulmonary effort is normal.      Breath sounds: Normal breath sounds.   Abdominal:      General: Abdomen is flat. Bowel sounds are normal.      Palpations: Abdomen is soft. There is no mass.   Musculoskeletal:         General: Swelling present. Normal range of motion.      Cervical back: Neck supple.      Right lower leg: Edema present.      Left lower leg: Edema present.   Skin:     General: Skin is warm and dry.   Neurological:      General: No focal deficit present.      Mental Status: He is alert and oriented to person, place, and time.   Psychiatric:         Mood and Affect: Mood normal.         Behavior: Behavior normal.         Fluids    Intake/Output Summary (Last 24 hours) at 10/16/2023 1436  Last data filed at 10/16/2023 1345  Gross per 24 hour   Intake 360 ml   Output 100 ml   Net 260 ml       Laboratory  Recent Labs     10/15/23  1625 10/16/23  0639   WBC 16.5* 17.3*   RBC 4.50* 3.75*   HEMOGLOBIN 13.2* 11.2*   HEMATOCRIT 41.3* 34.3*   MCV 91.8 91.5   MCH 29.3 29.9   MCHC 32.0* 32.7   RDW 49.5 49.1   PLATELETCT 287 234   MPV 9.6 10.2     Recent Labs     10/15/23  1625 10/16/23  0311   SODIUM 141 138   POTASSIUM 4.1 4.2   CHLORIDE 103 103   CO2 25 21   GLUCOSE 124* 109*   BUN 16 14   CREATININE 1.38 1.25   CALCIUM 9.5 9.3                   Imaging  CT-HEAD W/O   Final Result      1.  Diffuse atrophy and white matter changes.   2.  No acute intracranial hemorrhage or territorial infarct.            DX-CHEST-PORTABLE (1 VIEW)   Final Result      Bibasilar atelectasis. No focal consolidation. No pleural effusions.              Assessment/Plan  * Sepsis (HCC)- (present on admission)  Assessment & Plan  This is Sepsis Present on admission  SIRS criteria identified on my evaluation include: Fever, with temperature greater than 100.9 deg F, Tachycardia, with heart rate greater than 90 BPM and Leukocytosis, with WBC greater than 12,000  Clinical indicators of end organ dysfunction  include Toxic Metabolic Encephalopathy  Source is likely urine  Sepsis protocol initiated  Crystalloid Fluid Administration: Fluid resuscitation ordered per standard protocol - 30 mL/kg per current or ideal body weight  IV antibiotics as appropriate for source of sepsis  Reassessment: I have reassessed the patient's hemodynamic status    UTI (urinary tract infection)  Assessment & Plan  Follow urine culture  Continue iv ceftriaxone     Hypophosphatemia- (present on admission)  Assessment & Plan  Likely 2/2 poor PO intake in setting of sepsis. PO4 2.1.    -Kphos neutral 250mg q6h for 5 doses    Acute hypoxic respiratory failure (HCC)- (present on admission)  Assessment & Plan  Likely 2/2 bibasilar atelectasis c/b sepsis. CXR showing bibasilar atelectasis. Saturating at 89% on RA.    -RT protocol  -Continuous pulse ox  -IS  -Wean O2 as tolerated     Acute encephalopathy- (present on admission)  Assessment & Plan  Likely toxic metabolic encephalopathy 2/2 sepsis.    -CT head w/o neg  -Improved to baseline after received sepsis fluid bolus and C3 x1.    Hyperbilirubinemia- (present on admission)  Assessment & Plan  Likely 2/2 hypoperfusion in the setting of sepsis. T.bili 2.4, up from 1.3 in August. Denies abdominal pain.  Resolved    Acute kidney injury (HCC)- (present on admission)  Assessment & Plan  Likely 2/2 hypoperfusion in the setting of sepsis. Cr 1.38, baseline 1.0.  Avoid nephrotoxins, renal dosing meds  IVF  Cont monitoring     Normocytic anemia- (present on admission)  Assessment & Plan  Hgb 13.2 and MCV 91.8. Baseline Hgb 14-15. No sign of active bleeding  Iron profile c/w KATHE  Start iron supplements         VTE prophylaxis:    enoxaparin ppx      I have performed a physical exam and reviewed and updated ROS and Plan today (10/16/2023). In review of yesterday's note (10/15/2023), there are no changes except as documented above.    Patient is has a high medical complexity, complex decision making and is at  high risk for complication, morbidity, and mortality.    My total time spent caring for the patient on the day of the encounter was 52  minutes.   This does not include time spent on separately billable procedures/tests.

## 2023-10-16 NOTE — H&P
[facility] Internal Medicine History & Physical Note    Date of Service  10/15/2023    [facility] Team: VIKTOR[facility]   Attending: Leo Martin M.d.  Senior Resident: Dr. Reggie Covington  Contact Number: [phone]    Primary Care Physician  Armaan Koch D.O.    Consultants  none    Code Status  Full Code    Chief Complaint  Chief Complaint   Patient presents with    Fever     TMAX 102.5 F    ALOC     Per family       History of Presenting Illness (HPI): Patient is a 74yo male with PMH of BPH that presents after found by son in their room with AMS. Of note patient was recently admitted 08/22/23 to 08/26/23 after GLC with L inferior pubic ramus fx and L medial acetabular fx with non-operative management.    Patient's son Pee at bedside providing most of the information. Patient's son states that patient was complaining of fever and asked son to bring a thermometer. When patient was provided with a thermometer, their temp was 103.6F and was unable to answer questions appropriately. That is when patient's son decided to take them to the ED. Patient's son states that patient was tremulous, urinated themself, and had apple juice colored urine. Per ED physician patient was A&Ox1 on presentation, but when seen by me was A&Ox3.    In the ED, BP 160s-170s/70. HR 100s. T 102.3F. 96% on 2L. Received Tylenol 650mg PO x1, C3 x1, and 2.5L LR bolus x1. Bcx x2 drawn. WBC 16.5. Hgb 13.2. TSH 0.96. Glucose 124. Cr 1.38 (baseline 1.0). T.bili 2.4. COVID/flu/RSV neg. CT head w/o without acute abnormality. CXR showing bibasilar atelectasis. UA showing trace blood, mod leuk est, nitrite neg, and WBC 10-20.    I discussed the plan of care with patient and family.    Review of Systems  Review of Systems   Constitutional:  Positive for chills and fever. Negative for diaphoresis.   Respiratory:  Negative for cough, hemoptysis and shortness of breath.    Cardiovascular:  Negative for chest pain and palpitations.   Gastrointestinal:  Positive for  vomiting. Negative for abdominal pain, blood in stool, constipation, diarrhea, melena and nausea.   Genitourinary:  Negative for dysuria and hematuria.   Musculoskeletal:  Negative for back pain, joint pain and neck pain.   Neurological:  Negative for dizziness, sensory change, speech change, seizures, weakness and headaches.       Past Medical History   has a past medical history of BPH with elevated PSA and Tinnitus, bilateral.    Surgical History   has a past surgical history that includes prostate needle biopsy and inguinal hernia repair (Left).     Family History  family history includes Diabetes in his father; Heart Attack in his father; Other in his mother.   Family history reviewed with patient.     Social History  Tobacco: 10py smoker; quit 40yrs ago  Alcohol: One glass of wine monthly  Recreational drugs (illegal or prescription): Denies  Employment: Retired  Living Situation: Lives with son and son's elpidio  Recent Travel: Denies  Primary Care Provider: Reviewed Dr. Armaan Koch  Other (stressors, spirituality, exposures): N/A    Allergies  Allergies   Allergen Reactions    Aspirin Unspecified     Worsening tinnitus  Other reaction(s): Other (See Comments)  TINNITUS      Codeine      Other reaction(s): Unknown       Medications  Prior to Admission Medications   Prescriptions Last Dose Informant Patient Reported? Taking?   Glycerin-Polysorbate 80 (REFRESH DRY EYE THERAPY OP)   Yes No   Sig: Administer 2 Drops into both eyes 1 time a day as needed (dry eyes ). Indications: dry eyes   acetaminophen (TYLENOL 8 HOUR ARTHRITIS PAIN) 650 MG CR tablet   Yes No   Sig: Take 650 mg by mouth every 8 hours as needed for Mild Pain or Moderate Pain. Indications: Pain      Facility-Administered Medications: None       Physical Exam  Temp:  [37.8 °C (100 °F)-39.1 °C (102.3 °F)] 37.8 °C (100 °F)  Pulse:  [] 117  Resp:  [18-32] 19  BP: (137-175)/() 168/100  SpO2:  [90 %-97 %] 93 %  Blood Pressure : (!) 140/67    Temperature: (!) 38.4 °C (101.2 °F)   Pulse: (!) 121   Respiration: (!) 29   Pulse Oximetry: 95 %       Physical Exam  Constitutional:       General: He is not in acute distress.     Appearance: Normal appearance. He is not toxic-appearing.   HENT:      Head: Normocephalic and atraumatic.      Mouth/Throat:      Mouth: Mucous membranes are moist.      Pharynx: No oropharyngeal exudate or posterior oropharyngeal erythema.   Eyes:      General: No scleral icterus.     Extraocular Movements: Extraocular movements intact.      Conjunctiva/sclera: Conjunctivae normal.      Pupils: Pupils are equal, round, and reactive to light.   Cardiovascular:      Rate and Rhythm: Normal rate and regular rhythm.      Heart sounds: Normal heart sounds. No murmur heard.     No friction rub. No gallop.   Pulmonary:      Effort: Pulmonary effort is normal. No respiratory distress.      Breath sounds: Normal breath sounds. No stridor. No wheezing, rhonchi or rales.   Abdominal:      General: Abdomen is flat. There is no distension.      Palpations: Abdomen is soft. There is no mass.      Tenderness: There is no abdominal tenderness. There is no guarding or rebound.   Musculoskeletal:         General: Normal range of motion.      Cervical back: Neck supple. No rigidity.      Right lower leg: Edema (2+) present.      Left lower leg: Edema (1+) present.   Lymphadenopathy:      Cervical: No cervical adenopathy.   Skin:     General: Skin is warm and dry.      Coloration: Skin is not jaundiced.   Neurological:      General: No focal deficit present.      Mental Status: He is alert and oriented to person, place, and time. Mental status is at baseline.      Cranial Nerves: No cranial nerve deficit.      Motor: No weakness.   Psychiatric:         Mood and Affect: Mood normal.         Behavior: Behavior normal.         Thought Content: Thought content normal.         Judgment: Judgment normal.         Laboratory:  Recent Labs     10/15/23  8701  "  WBC 16.5*   RBC 4.50*   HEMOGLOBIN 13.2*   HEMATOCRIT 41.3*   MCV 91.8   MCH 29.3   MCHC 32.0*   RDW 49.5   PLATELETCT 287   MPV 9.6     Recent Labs     10/15/23  1625   SODIUM 141   POTASSIUM 4.1   CHLORIDE 103   CO2 25   GLUCOSE 124*   BUN 16   CREATININE 1.38   CALCIUM 9.5     Recent Labs     10/15/23  1625   ALTSGPT 9   ASTSGOT 9*   ALKPHOSPHAT 98   TBILIRUBIN 2.4*   GLUCOSE 124*         No results for input(s): \"NTPROBNP\" in the last 72 hours.      No results for input(s): \"TROPONINT\" in the last 72 hours.    Imaging:  CT-HEAD W/O   Final Result      1.  Diffuse atrophy and white matter changes.   2.  No acute intracranial hemorrhage or territorial infarct.            DX-CHEST-PORTABLE (1 VIEW)   Final Result      Bibasilar atelectasis. No focal consolidation. No pleural effusions.             X-Ray:  I have personally reviewed the images and compared with prior images.    Assessment/Plan:  Problem Representation:   Patient is a 74yo male admitted for sepsis likely 2/2 UTI or viral infection with subsequent FABIANO and acute encephalopathy. Received 2.5L bolus and C3 x1 in ED with subsequent improvement of encephalopathy.  I anticipate this patient will require at least two midnights for appropriate medical management, necessitating inpatient admission because of sepsis with subsequent AMS and FABIANO.    Patient will need a Med/Surg bed on EMERGENCY service .  The need is secondary to sepsis requiring IV antibiotics and fluid bolus.    * Sepsis (HCC)- (present on admission)  Assessment & Plan  This is Sepsis Present on admission  SIRS criteria identified on my evaluation include: Fever, with temperature greater than 100.9 deg F, Tachycardia, with heart rate greater than 90 BPM and Leukocytosis, with WBC greater than 12,000  Clinical indicators of end organ dysfunction include Toxic Metabolic Encephalopathy  Source is likely urine  Sepsis protocol initiated  Crystalloid Fluid Administration: Fluid resuscitation " ordered per standard protocol - 30 mL/kg per current or ideal body weight  IV antibiotics as appropriate for source of sepsis  Reassessment: I have reassessed the patient's hemodynamic status    -BCx x2 drawn in ED  -Received C3 2g x1 in ED  -Continue C3; can consider de-escalating tomorrow  -UA showing trace blood, mod leuk est, nitrite neg, and WBC 10-20  -Pending urine culture  -COVID/flu/RSV neg  -Order full respiratory panel    Acute hypoxic respiratory failure (HCC)- (present on admission)  Assessment & Plan  Likely 2/2 bibasilar atelectasis c/b sepsis. CXR showing bibasilar atelectasis. Saturating at 89% on RA.    -RT protocol  -Continuous pulse ox  -IS    Acute encephalopathy- (present on admission)  Assessment & Plan  Likely toxic metabolic encephalopathy 2/2 sepsis.    -CT head w/o neg  -Improved to baseline after received sepsis fluid bolus and C3 x1.    Acute kidney injury (HCC)- (present on admission)  Assessment & Plan  Likely 2/2 hypoperfusion in the setting of sepsis. Cr 1.38, baseline 1.0.    -Received 2.5L bolus  -CMP tomorrow    Bilateral lower extremity edema- (present on admission)  Assessment & Plan  Likely 2/2 debility in setting of recent pelvic/hip fracture. R>L.    -Can consider TTE and/or DVT US    Hypophosphatemia- (present on admission)  Assessment & Plan  Likely 2/2 poor PO intake in setting of sepsis. PO4 2.1.    -Kphos neutral 250mg q6h for 5 doses    Hyperbilirubinemia- (present on admission)  Assessment & Plan  Likely 2/2 hypoperfusion in the setting of sepsis. T.bili 2.4, up from 1.3 in August. Denies abdominal pain.    -CMP tomorrow    Normocytic anemia- (present on admission)  Assessment & Plan  Hgb 13.2 and MCV 91.8. Baseline Hgb 14-15.    -Order iron panel, ferritin, and retic        VTE prophylaxis: enoxaparin ppx

## 2023-10-17 ENCOUNTER — HOME CARE VISIT (OUTPATIENT)
Dept: HOME HEALTH SERVICES | Facility: HOME HEALTHCARE | Age: 73
End: 2023-10-17
Payer: MEDICARE

## 2023-10-17 PROBLEM — E87.6 HYPOKALEMIA: Status: ACTIVE | Noted: 2023-10-17

## 2023-10-17 LAB
ANION GAP SERPL CALC-SCNC: 9 MMOL/L (ref 7–16)
BACTERIA UR CULT: ABNORMAL
BACTERIA UR CULT: ABNORMAL
BUN SERPL-MCNC: 15 MG/DL (ref 8–22)
CALCIUM SERPL-MCNC: 8.4 MG/DL (ref 8.5–10.5)
CHLORIDE SERPL-SCNC: 104 MMOL/L (ref 96–112)
CO2 SERPL-SCNC: 25 MMOL/L (ref 20–33)
CREAT SERPL-MCNC: 1.05 MG/DL (ref 0.5–1.4)
ERYTHROCYTE [DISTWIDTH] IN BLOOD BY AUTOMATED COUNT: 48.2 FL (ref 35.9–50)
GFR SERPLBLD CREATININE-BSD FMLA CKD-EPI: 74 ML/MIN/1.73 M 2
GLUCOSE SERPL-MCNC: 112 MG/DL (ref 65–99)
HCT VFR BLD AUTO: 32.3 % (ref 42–52)
HGB BLD-MCNC: 10.4 G/DL (ref 14–18)
MAGNESIUM SERPL-MCNC: 1.8 MG/DL (ref 1.5–2.5)
MCH RBC QN AUTO: 29.1 PG (ref 27–33)
MCHC RBC AUTO-ENTMCNC: 32.2 G/DL (ref 32.3–36.5)
MCV RBC AUTO: 90.2 FL (ref 81.4–97.8)
PHOSPHATE SERPL-MCNC: 2.7 MG/DL (ref 2.5–4.5)
PLATELET # BLD AUTO: 195 K/UL (ref 164–446)
PMV BLD AUTO: 10.5 FL (ref 9–12.9)
POTASSIUM SERPL-SCNC: 3.2 MMOL/L (ref 3.6–5.5)
RBC # BLD AUTO: 3.58 M/UL (ref 4.7–6.1)
SIGNIFICANT IND 70042: ABNORMAL
SITE SITE: ABNORMAL
SODIUM SERPL-SCNC: 138 MMOL/L (ref 135–145)
SOURCE SOURCE: ABNORMAL
WBC # BLD AUTO: 12.6 K/UL (ref 4.8–10.8)

## 2023-10-17 PROCEDURE — 36415 COLL VENOUS BLD VENIPUNCTURE: CPT

## 2023-10-17 PROCEDURE — 700105 HCHG RX REV CODE 258: Performed by: STUDENT IN AN ORGANIZED HEALTH CARE EDUCATION/TRAINING PROGRAM

## 2023-10-17 PROCEDURE — 770006 HCHG ROOM/CARE - MED/SURG/GYN SEMI*

## 2023-10-17 PROCEDURE — A9270 NON-COVERED ITEM OR SERVICE: HCPCS | Performed by: STUDENT IN AN ORGANIZED HEALTH CARE EDUCATION/TRAINING PROGRAM

## 2023-10-17 PROCEDURE — 85027 COMPLETE CBC AUTOMATED: CPT

## 2023-10-17 PROCEDURE — 99233 SBSQ HOSP IP/OBS HIGH 50: CPT | Performed by: INTERNAL MEDICINE

## 2023-10-17 PROCEDURE — 84100 ASSAY OF PHOSPHORUS: CPT

## 2023-10-17 PROCEDURE — 700111 HCHG RX REV CODE 636 W/ 250 OVERRIDE (IP): Mod: JZ | Performed by: STUDENT IN AN ORGANIZED HEALTH CARE EDUCATION/TRAINING PROGRAM

## 2023-10-17 PROCEDURE — 700102 HCHG RX REV CODE 250 W/ 637 OVERRIDE(OP): Performed by: STUDENT IN AN ORGANIZED HEALTH CARE EDUCATION/TRAINING PROGRAM

## 2023-10-17 PROCEDURE — A9270 NON-COVERED ITEM OR SERVICE: HCPCS | Mod: JZ | Performed by: INTERNAL MEDICINE

## 2023-10-17 PROCEDURE — 700105 HCHG RX REV CODE 258: Performed by: INTERNAL MEDICINE

## 2023-10-17 PROCEDURE — 97162 PT EVAL MOD COMPLEX 30 MIN: CPT

## 2023-10-17 PROCEDURE — 83735 ASSAY OF MAGNESIUM: CPT

## 2023-10-17 PROCEDURE — 700102 HCHG RX REV CODE 250 W/ 637 OVERRIDE(OP): Mod: JZ | Performed by: INTERNAL MEDICINE

## 2023-10-17 PROCEDURE — 700111 HCHG RX REV CODE 636 W/ 250 OVERRIDE (IP): Performed by: INTERNAL MEDICINE

## 2023-10-17 PROCEDURE — 80048 BASIC METABOLIC PNL TOTAL CA: CPT

## 2023-10-17 RX ORDER — POTASSIUM CHLORIDE 20 MEQ/1
40 TABLET, EXTENDED RELEASE ORAL ONCE
Status: COMPLETED | OUTPATIENT
Start: 2023-10-17 | End: 2023-10-17

## 2023-10-17 RX ADMIN — ENOXAPARIN SODIUM 40 MG: 100 INJECTION SUBCUTANEOUS at 17:53

## 2023-10-17 RX ADMIN — POTASSIUM CHLORIDE 40 MEQ: 1500 TABLET, EXTENDED RELEASE ORAL at 08:19

## 2023-10-17 RX ADMIN — AMPICILLIN SODIUM 2000 MG: 2 INJECTION, POWDER, FOR SOLUTION INTRAVENOUS at 22:10

## 2023-10-17 RX ADMIN — AMPICILLIN SODIUM 2000 MG: 2 INJECTION, POWDER, FOR SOLUTION INTRAVENOUS at 17:54

## 2023-10-17 RX ADMIN — SODIUM CHLORIDE, POTASSIUM CHLORIDE, SODIUM LACTATE AND CALCIUM CHLORIDE: 600; 310; 30; 20 INJECTION, SOLUTION INTRAVENOUS at 02:55

## 2023-10-17 RX ADMIN — SENNOSIDES AND DOCUSATE SODIUM 2 TABLET: 50; 8.6 TABLET ORAL at 18:00

## 2023-10-17 ASSESSMENT — GAIT ASSESSMENTS
ASSISTIVE DEVICE: FRONT WHEEL WALKER
GAIT LEVEL OF ASSIST: CONTACT GUARD ASSIST
DISTANCE (FEET): 20
DEVIATION: BRADYKINETIC;DECREASED BASE OF SUPPORT

## 2023-10-17 ASSESSMENT — PAIN DESCRIPTION - PAIN TYPE: TYPE: ACUTE PAIN

## 2023-10-17 ASSESSMENT — COGNITIVE AND FUNCTIONAL STATUS - GENERAL
WALKING IN HOSPITAL ROOM: A LITTLE
SUGGESTED CMS G CODE MODIFIER MOBILITY: CJ
MOBILITY SCORE: 22
CLIMB 3 TO 5 STEPS WITH RAILING: A LITTLE

## 2023-10-17 NOTE — CARE PLAN
The patient is Stable - Low risk of patient condition declining or worsening    Shift Goals  Clinical Goals: pt will remain afebrile by end of shift., will have BM by end of shift  Patient Goals: rest and comfort  Family Goals: jessenia    Progress made toward(s) clinical / shift goals:      Patient is not progressing towards the following goals:

## 2023-10-17 NOTE — PROGRESS NOTES
Hospital Medicine Daily Progress Note    Date of Service  10/17/2023    Chief Complaint  Confusion    Hospital Course  Ramesh Manjarrez is a 73 y.o. male with BPH, admitted 10/15/2023 with confusion. Of note patient was recently admitted 08/22/23 to 08/26/23 after ground-level fall resulting in L inferior pubic ramus fx and L medial acetabular fx with non-operative management.  On evaluation, vital signs were stable.  Urinalysis was positive for UTI. COVID/flu/RSV were negative. CT head showed no acute abnormality. CXR only showed bibasilar atelectasis.  He had FABIANO with creatinine of 1.38 up from baseline of 1.0.  Patient was started on antibiotics.  He was given IV fluids.    Interval Problem Update  10/17/2023 - I reviewed the patient's chart. There were no significant overnight events. Remains hemodynamically stable. Afebrile since 10/16 PM. Stable on 1L O2 NC.  WBC improved to 12,600.  Hemoglobin is stable.  Potassium 3.2.  Magnesium level is normal.  Creatinine improved to 1.05.  Expanded viral panel was negative.  Urine cultures growing Enterococcus faecalis.  Blood cultures remain negative.    > I have personally seen and examined the patient today.  He is awake, easily arousable, answers to questions appropriately.  Oriented to self, place, time and circumstances.  Denies any pain.  No nausea, vomiting, abdominal pain, chest pain or shortness of breath.    I personally reviewed all lab results mentioned above. Prior medical records from this institution and outside facilities were independently reviewed as noted. I also personally reviewed all ER physician and consultant recommendations and plans as documented above. History was independently obtained by myself. I have discussed this patient's plan of care and discharge plan at IDT rounds today with Case Management, Nursing, Nursing leadership, and other members of the IDT team.    Consultants/Specialty  None    Code Status  Full Code    Disposition  The  patient is not medically cleared for discharge to home or a post-acute facility.      Discharge plan TBD.  PT/OT evaluation.  I have placed the appropriate orders for post-discharge needs.    Review of Systems  ROS     Pertinent positives/negatives as mentioned above.     A complete review of systems was personally done by me. All other systems were negative.       Physical Exam  Temp:  [36.2 °C (97.2 °F)-37 °C (98.6 °F)] 36.3 °C (97.3 °F)  Pulse:  [82-94] 87  Resp:  [18-19] 18  BP: (105-151)/() 130/75  SpO2:  [91 %-95 %] 95 %    Physical Exam  Vitals reviewed.   Constitutional:       General: He is not in acute distress.     Appearance: Normal appearance. He is not toxic-appearing or diaphoretic.   HENT:      Head: Normocephalic and atraumatic.      Right Ear: External ear normal.      Left Ear: External ear normal.      Mouth/Throat:      Mouth: Mucous membranes are moist.      Pharynx: No oropharyngeal exudate.   Eyes:      General: No scleral icterus.     Extraocular Movements: Extraocular movements intact.      Conjunctiva/sclera: Conjunctivae normal.      Pupils: Pupils are equal, round, and reactive to light.   Cardiovascular:      Rate and Rhythm: Regular rhythm.      Heart sounds: Normal heart sounds. No murmur heard.     No gallop.   Pulmonary:      Effort: Pulmonary effort is normal. No respiratory distress.      Breath sounds: Normal breath sounds. No stridor. No wheezing, rhonchi or rales.   Chest:      Chest wall: No tenderness.   Abdominal:      General: Bowel sounds are normal. There is no distension.      Palpations: Abdomen is soft. There is no mass.      Tenderness: There is no abdominal tenderness. There is no guarding or rebound.   Musculoskeletal:         General: No swelling. Normal range of motion.      Cervical back: Normal range of motion and neck supple.      Right lower leg: No edema.      Left lower leg: No edema.   Lymphadenopathy:      Cervical: No cervical adenopathy.   Skin:      General: Skin is warm and dry.      Coloration: Skin is not jaundiced.      Findings: No rash.   Neurological:      General: No focal deficit present.      Mental Status: He is alert and oriented to person, place, and time.      Cranial Nerves: No cranial nerve deficit.   Psychiatric:         Mood and Affect: Mood normal.         Behavior: Behavior normal.         Thought Content: Thought content normal.         Judgment: Judgment normal.         Fluids    Intake/Output Summary (Last 24 hours) at 10/17/2023 1548  Last data filed at 10/17/2023 1538  Gross per 24 hour   Intake 480 ml   Output 950 ml   Net -470 ml       Laboratory  Recent Labs     10/15/23  1625 10/16/23  0639 10/17/23  0532   WBC 16.5* 17.3* 12.6*   RBC 4.50* 3.75* 3.58*   HEMOGLOBIN 13.2* 11.2* 10.4*   HEMATOCRIT 41.3* 34.3* 32.3*   MCV 91.8 91.5 90.2   MCH 29.3 29.9 29.1   MCHC 32.0* 32.7 32.2*   RDW 49.5 49.1 48.2   PLATELETCT 287 234 195   MPV 9.6 10.2 10.5     Recent Labs     10/15/23  1625 10/16/23  0311 10/17/23  0532   SODIUM 141 138 138   POTASSIUM 4.1 4.2 3.2*   CHLORIDE 103 103 104   CO2 25 21 25   GLUCOSE 124* 109* 112*   BUN 16 14 15   CREATININE 1.38 1.25 1.05   CALCIUM 9.5 9.3 8.4*                   Imaging  CT-HEAD W/O   Final Result      1.  Diffuse atrophy and white matter changes.   2.  No acute intracranial hemorrhage or territorial infarct.            DX-CHEST-PORTABLE (1 VIEW)   Final Result      Bibasilar atelectasis. No focal consolidation. No pleural effusions.              Assessment/Plan  * Sepsis (HCC)- (present on admission)  Assessment & Plan  -This is Sepsis Present on admission  -SIRS criteria identified on my evaluation include: Fever, with temperature greater than 100.9 deg F, Tachycardia, with heart rate greater than 90 BPM and Leukocytosis, with WBC greater than 12,000  -Clinical indicators of end organ dysfunction include Toxic Metabolic Encephalopathy  -Source is likely urine.  Continue antibiotics.  Follow  cultures.  -Continue sepsis protocol.  Off IV fluids, lactate has normalized.  -Reassessment: I have reassessed the patient's hemodynamic status.  Continue close hemodynamic monitoring.    UTI (urinary tract infection)- (present on admission)  Assessment & Plan  -Urine culture growing Enterococcus faecalis. Change antibiotics to IV ampicillin for better coverage. Trend  WBC count, would want to see further improvement and resolution of leukocytosis.      Acute encephalopathy- (present on admission)  Assessment & Plan  -Likely toxic metabolic encephalopathy due to sepsis.  Head CT negative.  -Improving.  -Continue management of UTI.  -Frequent re-orientation, reestablish circadian rhythm, encourage familiar faces/family in room, avoid or minimize narcotics/sedatives. Minimize tethering (IV lines, monitors, catheters).    Hypokalemia  Assessment & Plan  - Replace with 40 mEq of oral K-Dur.  Magnesium level is normal.  BMP in the morning.    Hypophosphatemia- (present on admission)  Assessment & Plan  - Replaced.  Phosphate level improved.  Continue to trend.    Acute hypoxic respiratory failure (HCC)- (present on admission)  Assessment & Plan  -Likely due to bibasilar atelectasis complicated by sepsis.  Requiring 1 L of oxygen by nasal cannula.  -Continue to wean off oxygen.  Keep saturations above 88%.  Stop IV fluids.  -Continue incentive spirometry.    Hyperbilirubinemia- (present on admission)  Assessment & Plan  -Likely due to hypoperfusion in the setting of sepsis. T.bili 2.4, up from 1.3 in August. Denies abdominal pain.  -Resolved    Acute kidney injury (HCC)- (present on admission)  Assessment & Plan  -Likely due to hypoperfusion in the setting of sepsis. Cr on admission was 1.38, baseline 1.0.  -Improved.  Renal function back to baseline.  - avoid nephrotoxins, and continue to renally dose all medications.    Normocytic anemia- (present on admission)  Assessment & Plan  -Hemoglobin remained stable.  No signs  of active bleeding.  Iron profile consistent with anemia of chronic disease, with low iron and high ferritin.    -Continue to monitor hemoglobin.  Restrictive transfusion strategy.         VTE prophylaxis:    enoxaparin ppx      My total time spent caring for the patient on the day of the encounter was 51 minutes. This does not include time spent on separately billable procedures/tests.

## 2023-10-17 NOTE — THERAPY
Physical Therapy   Initial Evaluation     Patient Name: Ramesh Manjarrez  Age:  73 y.o., Sex:  male  Medical Record #: [id number]  Today's Date: 10/17/2023     Precautions  Precautions: Fall Risk  Comments: per pt, NICOLETTE cleared for WBAT after xray follow up for left hip    Assessment  Pt presents with impaired activity tolerance associated with chief complaint of AMS and fever, found to have sepsis from UTI source in setting of remote GLF sustaining nonop left acetabular fx, per pt now WBAT per NICOLETTE MD after imaging confirmed healing; pt able to report the last few months of SNF to home and progression with home health PT. Had started to ambulate with 4ww but still using w/c primarily; reporting his son's assist for IADLs and out of house trips; from a PT perspective, pt with negative orthostatics, stand by assist mobility for transfers and CGA with ambulation short distances which appears to be his recent baseline. Recommend dc home when medically appropriate to do so with continuation of home health PT.     Plan    Physical Therapy Initial Treatment Plan   Treatment Plan : Equipment, Bed Mobility, Manual Therapy, Neuro Re-Education / Balance, Self Care / Home Evaluation, Therapeutic Activities, Therapeutic Exercise, Stair Training  Treatment Frequency: 3 Times per Week  Duration: Until Therapy Goals Met    DC Equipment Recommendations: None ( per prior chart reviews, has all home equipment including home 02 that he himself weaned due to sp02 ~ 97% on his home pulse ox)   Discharge Recommendations: Recommend home health for continued physical therapy services if pt agreeable; if son can drive, he may progress to outpatient PT        Abridged Subjective/Objective     10/17/23 1345   Prior Living Situation   Prior Services Intermittent Physical Support for ADL Per Family   Housing / Facility 1 Story House   Steps Into Home 2   Equipment Owned 4-Wheel Walker;Wheelchair   Lives with - Patient's Self Care Capacity Adult  Children   Comments denies falls since dc, able to report his timeline of GLF, then SNF, then home, then with home PT; able to report stair sequencing and how he moves at home; reporting the UTI 'snuck up on me' and his urnie was a bit dark; no cognitive issues noted with conversation; was a bit flat with walking but appeared dual tasking issues   Prior Level of Functional Mobility   Bed Mobility Independent   Transfer Status Independent   Ambulation Independent   Ambulation Distance ~ 10ft   Assistive Devices Used 4-Wheel Walker   Cognition    Cognition / Consciousness X   Level of Consciousness Alert   Comments per RN aa&o x 2 however completely appropriate during conversation with this therapist;   Passive ROM Lower Body   Passive ROM Lower Body WDL   Strength Lower Body   Lower Body Strength  WDL   Comments no overpressure at left hip   Sensation Lower Body   Lower Extremity Sensation   X   Comments reports occasional numbness in left L1 dermatome   Balance Assessment   Sitting Balance (Static) Good   Sitting Balance (Dynamic) Good   Standing Balance (Static) Fair +   Standing Balance (Dynamic) Fair   Weight Shift Sitting Good   Weight Shift Standing Fair   Comments B UE support in sitting/standing; no loss of balance in moving environment but appears flat once out in hallway   Bed Mobility    Supine to Sit Modified Independent   Sit to Supine Modified Independent   Gait Analysis   Gait Level Of Assist Contact Guard Assist   Assistive Device Front Wheel Walker   Distance (Feet) 20   # of Times Distance was Traveled 1   Deviation Bradykinetic;Decreased Base Of Support   Weight Bearing Status full   Vision Deficits Impacting Mobility denies   Comments distance limited by pt, reporting 'didn't want to get too far away' but no overt signs of instsability and negative orthstatics; sp02 96%   Functional Mobility   Sit to Stand Standby Assist  (with FWW)   Short Term Goals    Short Term Goal # 1 Pt will ambulate x  150ft with FWW and supervision within 6 visits to return to independence.   Short Term Goal # 2 Pt will ascend/descend 2 steps with B UE support and contact guard within 6 visits to enter/exit home.   Education Group   Role of Physical Therapist Patient Response Patient;Acceptance;Explanation;Demonstration;Verbal Demonstration;Action Demonstration   Additional Comments water in diet

## 2023-10-17 NOTE — DISCHARGE PLANNING
"[facility] TCN chart review completed. Collaborated with DONALD Washington prior to meeting with the pt. The most current review of medical record, knowledge of pt's PLOF and social support, LACE+ score of 58 and 6 clicks scores of 20 for ADLs and 17 for mobility were considered. Per chart review, patient on IV ABX and 2L O2.     Pt seen at bedside. Introduced TCN program. Provided education regarding post acute levels of care. Discussed [facility] plan benefits (Meds to Beds, medical uber and [facility] transitional care). Pt seemed a little confused at times, but verbalized understanding. He kept saying \"I'm don't have any oxygen on\" (while wearing nasal cannula on 2L O2). He states he uses a 4WW for mobility and ADLs. He lives with his son Pee in [address] [place] [address] in [place], [place]. He was on service with [facility] for PT, OT, and skilled nursing. When asked if he would like to resume HH, he said \"no I don't want anyone coming to my home. They got me sick.\" Note per MD, patient likely has UTI and sepsis. Patient refused [facility] referral and TCN assistance scheduling hospital f/u. Patient declined to give DME(O2) choice as he said he has an O2 concentrator at home. He could not recall whether or not he uses O2 concentrator or which DME company he uses.     TCN will continue to follow and collaborate with discharge planning team as additional post acute needs arise. Thank you.     Completed today:  Choice obtained: none (see above)  Patient refused TCN help scheduling hospital f/u appt and also refused [facility] referral    *Addendum 1735 - Note TCN looked into claims for previous home O2 use and found none.   "

## 2023-10-17 NOTE — PROGRESS NOTES
Received pt axo2.  Pt seen laying on bed at semi-fowlers position awake. Pt able to make some needs known. On o2 support via nasal cannula running 2lpm tolerated well. Febrile with temp of 100.7F , PRN medication given as ordered. IV fluids infusing at 83cc/hr.

## 2023-10-17 NOTE — CARE PLAN
The patient is Stable - Low risk of patient condition declining or worsening    Shift Goals  Clinical Goals: pt will remain afebrile by end of shift., will have BM by end of shift  Patient Goals: rest and comfort  Family Goals: jessenia    Progress made toward(s) clinical / shift goals:  pt axo2. Pt urine came back positive. Abx therapy adjusted. Pt evaluated by by PT/OT and tolerated initial treatment well. Due medications given as ordered. Care plan ongoing.       Problem: Hemodynamics  Goal: Patient's hemodynamics, fluid balance and neurologic status will be stable or improve  Outcome: Progressing     Problem: Fluid Volume  Goal: Fluid volume balance will be maintained  Outcome: Progressing     Problem: Urinary - Renal Perfusion  Goal: Ability to achieve and maintain adequate renal perfusion and functioning will improve  Outcome: Progressing     Problem: Respiratory  Goal: Patient will achieve/maintain optimum respiratory ventilation and gas exchange  Outcome: Progressing     Problem: Fall Risk  Goal: Patient will remain free from falls  Outcome: Progressing     Problem: Skin Integrity  Goal: Skin integrity is maintained or improved  Outcome: Progressing       Patient is not progressing towards the following goals:        Problem: Knowledge Deficit - Standard  Goal: Patient and family/care givers will demonstrate understanding of plan of care, disease process/condition, diagnostic tests and medications  Outcome: Not Progressing     Problem: Physical Regulation  Goal: Diagnostic test results will improve  Outcome: Not Progressing  Goal: Signs and symptoms of infection will decrease  Outcome: Not Progressing

## 2023-10-18 VITALS
TEMPERATURE: 96.9 F | HEIGHT: 67 IN | BODY MASS INDEX: 28.25 KG/M2 | HEART RATE: 88 BPM | OXYGEN SATURATION: 98 % | DIASTOLIC BLOOD PRESSURE: 80 MMHG | RESPIRATION RATE: 18 BRPM | WEIGHT: 180 LBS | SYSTOLIC BLOOD PRESSURE: 139 MMHG

## 2023-10-18 LAB
ANION GAP SERPL CALC-SCNC: 10 MMOL/L (ref 7–16)
BUN SERPL-MCNC: 15 MG/DL (ref 8–22)
CALCIUM SERPL-MCNC: 8.6 MG/DL (ref 8.5–10.5)
CHLORIDE SERPL-SCNC: 106 MMOL/L (ref 96–112)
CO2 SERPL-SCNC: 25 MMOL/L (ref 20–33)
CREAT SERPL-MCNC: 0.96 MG/DL (ref 0.5–1.4)
ERYTHROCYTE [DISTWIDTH] IN BLOOD BY AUTOMATED COUNT: 48.7 FL (ref 35.9–50)
GFR SERPLBLD CREATININE-BSD FMLA CKD-EPI: 83 ML/MIN/1.73 M 2
GLUCOSE SERPL-MCNC: 109 MG/DL (ref 65–99)
HCT VFR BLD AUTO: 32 % (ref 42–52)
HGB BLD-MCNC: 10 G/DL (ref 14–18)
MCH RBC QN AUTO: 28.3 PG (ref 27–33)
MCHC RBC AUTO-ENTMCNC: 31.3 G/DL (ref 32.3–36.5)
MCV RBC AUTO: 90.7 FL (ref 81.4–97.8)
PLATELET # BLD AUTO: 216 K/UL (ref 164–446)
PMV BLD AUTO: 10.4 FL (ref 9–12.9)
POTASSIUM SERPL-SCNC: 3.5 MMOL/L (ref 3.6–5.5)
RBC # BLD AUTO: 3.53 M/UL (ref 4.7–6.1)
SODIUM SERPL-SCNC: 141 MMOL/L (ref 135–145)
WBC # BLD AUTO: 8.1 K/UL (ref 4.8–10.8)

## 2023-10-18 PROCEDURE — A9270 NON-COVERED ITEM OR SERVICE: HCPCS | Mod: JZ | Performed by: INTERNAL MEDICINE

## 2023-10-18 PROCEDURE — 700111 HCHG RX REV CODE 636 W/ 250 OVERRIDE (IP): Performed by: INTERNAL MEDICINE

## 2023-10-18 PROCEDURE — 36415 COLL VENOUS BLD VENIPUNCTURE: CPT

## 2023-10-18 PROCEDURE — 85027 COMPLETE CBC AUTOMATED: CPT

## 2023-10-18 PROCEDURE — 700102 HCHG RX REV CODE 250 W/ 637 OVERRIDE(OP): Mod: JZ | Performed by: INTERNAL MEDICINE

## 2023-10-18 PROCEDURE — 99239 HOSP IP/OBS DSCHRG MGMT >30: CPT | Performed by: INTERNAL MEDICINE

## 2023-10-18 PROCEDURE — 80048 BASIC METABOLIC PNL TOTAL CA: CPT

## 2023-10-18 PROCEDURE — 97535 SELF CARE MNGMENT TRAINING: CPT

## 2023-10-18 PROCEDURE — 700105 HCHG RX REV CODE 258: Performed by: INTERNAL MEDICINE

## 2023-10-18 RX ORDER — AMOXICILLIN 500 MG/1
500 CAPSULE ORAL 3 TIMES DAILY
Qty: 15 CAPSULE | Refills: 0 | Status: ACTIVE | OUTPATIENT
Start: 2023-10-18 | End: 2023-10-23

## 2023-10-18 RX ORDER — POTASSIUM CHLORIDE 20 MEQ/1
40 TABLET, EXTENDED RELEASE ORAL ONCE
Status: COMPLETED | OUTPATIENT
Start: 2023-10-18 | End: 2023-10-18

## 2023-10-18 RX ADMIN — POTASSIUM CHLORIDE 40 MEQ: 1500 TABLET, EXTENDED RELEASE ORAL at 09:01

## 2023-10-18 RX ADMIN — AMPICILLIN SODIUM 2000 MG: 2 INJECTION, POWDER, FOR SOLUTION INTRAVENOUS at 01:55

## 2023-10-18 RX ADMIN — AMPICILLIN SODIUM 2000 MG: 2 INJECTION, POWDER, FOR SOLUTION INTRAVENOUS at 14:31

## 2023-10-18 RX ADMIN — AMPICILLIN SODIUM 2000 MG: 2 INJECTION, POWDER, FOR SOLUTION INTRAVENOUS at 05:08

## 2023-10-18 RX ADMIN — AMPICILLIN SODIUM 2000 MG: 2 INJECTION, POWDER, FOR SOLUTION INTRAVENOUS at 09:05

## 2023-10-18 ASSESSMENT — PAIN DESCRIPTION - PAIN TYPE
TYPE: ACUTE PAIN
TYPE: ACUTE PAIN

## 2023-10-18 NOTE — FACE TO FACE
"Face to Face Note  -  Durable Medical Equipment    Benito Frias M.D. - NPI: [id number]  I certify that this patient is under my care and that they had a durable medical equipment(DME)face to face encounter by myself that meets the physician DME face-to-face encounter requirements with this patient on:    Date of encounter:   Patient:                    MRN:                       YOB: 2024  Ramesh Manjarrez  [id number]  1950     The encounter with the patient was in whole, or in part, for the following medical condition, which is the primary reason for durable medical equipment:  Other - chronic respiratory failure with hypoxia    I certify that, based on my findings, the following durable medical equipment is medically necessary:    Oxygen   HOME O2 Saturation Measurements:(Values must be present for Home Oxygen orders)  Room air sat at rest: 94  Room air sat with amb: 82  With liters of O2: 1, O2 sat at rest with O2: 95  With Liters of O2: 2, O2 sat with amb with O2 : 94  Is the patient mobile?: Yes  If patient feels more short of breath, they can go up to 6 liters per minute and contact healthcare provider.    Supporting Symptoms: The patient requires supplemental oxygen, as the following interventions have been tried with limited or no improvement: \"Ambulation with oximetry and \"Incentive spirometry.    My Clinical findings support the need for the above equipment due to:  Hypoxia  "

## 2023-10-18 NOTE — CARE PLAN
The patient is Stable - Low risk of patient condition declining or worsening    Shift Goals  Clinical Goals: Pt will remain free from any fall or injury throughout shift  Patient Goals: Sleep and rest  Family Goals: jessenia    Progress made toward(s) clinical / shift goals:      Patient is not progressing towards the following goals:    Pt's abx therapy adjusted. Pt mostly in bed; Back pain controlled with him repositioning himself from side to side as per patient. Bed alarm on, call light within reach.

## 2023-10-18 NOTE — DISCHARGE PLANNING
[facility] TCN chart review completed. Collaborated with DONALD Washington. Current discharge considerations are home with home O2 if needed. Per chart review, patient on 1L O2 and IV ABX. Patient seen at bedside. Son present. Patient gave consent to discuss care in front of family. Patient gave choice for DME(O2), but still declines to give HH choice. Patient and son both report that HH staff came to house but did nothing.     PT/OT evals pending.    TCN will continue to follow and collaborate with discharge planning team as additional post acute needs arise. Thank you.    Completed today:  PT/OT evals pending  Choice obtained: DME(O2)  Patient refused TCN help scheduling hospital f/u appt and also refused [facility] referral    *Addendum [id number] - PT eval completed with recommendation for HH (outpatient if son can drive).

## 2023-10-18 NOTE — DISCHARGE SUMMARY
Discharge Summary    CHIEF COMPLAINT ON ADMISSION  Chief Complaint   Patient presents with    Fever     TMAX 102.5 F    ALOC     Per family       Reason for Admission  EMS     Admission Date  10/15/2023    CODE STATUS  Full Code    HPI & HOSPITAL COURSE  Ramesh Manjarrez is a 73 y.o. male with BPH, home oxygen dependent chronic respiratory failure (1-2L at home) admitted 10/15/2023 with confusion. Of note patient was recently admitted 08/22/23 to 08/26/23 after ground-level fall resulting in L inferior pubic ramus fx and L medial acetabular fx with non-operative management.  On evaluation, vital signs were stable.  Urinalysis was positive for UTI. COVID/flu/RSV were negative. CT head showed no acute abnormality. CXR only showed bibasilar atelectasis.  He had FABIANO with creatinine of 1.38 up from baseline of 1.0.  Patient was started on antibiotics and IV fluids. Expanded viral panel was negative.  Urine cultures subsequently grew Enterococcus faecalis.  Blood cultures remain negative.  He was changed on appropriate antibiotics for Enterococcus coverage.  He had low potassium which was replaced.  Renal function improved.  WBC count has normalized.  Blood cultures remain negative.  Mentation improved to baseline.  He remained stable on his chronic home oxygen requirement.  He remained hemodynamically stable and afebrile.  PT recommended home health services, but patient declined services.    I have personally seen and examined the patient on the day of discharge. With his clinical improvement, he was deemed ready to discharge from the hospital as he did not have any further hospitalization needs. Patient felt comfortable going home. The discharge plan was discussed with the patient, with which he was agreeable to.     Therefore, he is discharged in good and stable condition to home with close outpatient follow-up.    The patient met 2-midnight criteria for an inpatient stay at the time of discharge.    Discharge  Date  10/18/2023      FOLLOW UP ITEMS POST DISCHARGE  -He will complete 5 days of oral amoxicillin.  -Follow-up with PCP.  - counseled to seek immediate medical attention, or return to the ED for recurrent or worsening symptoms.      DISCHARGE DIAGNOSES  Principal Problem:    Sepsis (HCC) (POA: Yes)  Active Problems:    Acute encephalopathy (POA: Yes)    Enterococcus faecalis UTI (urinary tract infection) (POA: Yes)    Acute kidney injury (HCC) (POA: Yes)    Hyperbilirubinemia (POA: Yes)    Acute hypoxic respiratory failure (HCC) (POA: Yes)    Hypophosphatemia (POA: Yes)    Hypokalemia (POA: No)    Normocytic anemia (POA: Yes)  Resolved Problems:    * No resolved hospital problems. *      FOLLOW UP  No future appointments.  No follow-up provider specified.    MEDICATIONS ON DISCHARGE     Medication List        START taking these medications        Instructions   amoxicillin 500 MG Caps  Commonly known as: Amoxil   Take 1 Capsule by mouth 3 times a day for 5 days.  Dose: 500 mg            CONTINUE taking these medications        Instructions   Tylenol 8 Hour Arthritis Pain 650 MG CR tablet  Generic drug: acetaminophen   Take 650 mg by mouth every 8 hours as needed for Mild Pain or Moderate Pain. Indications: Pain  Dose: 650 mg              Allergies  Allergies   Allergen Reactions    Aspirin Unspecified     Worsening tinnitus  Other reaction(s): Other (See Comments)  TINNITUS      Codeine      Other reaction(s): Unknown       DIET  Orders Placed This Encounter   Procedures    Diet Order Diet: Regular     Standing Status:   Standing     Number of Occurrences:   1     Order Specific Question:   Diet:     Answer:   Regular [1]       ACTIVITY  As tolerated.  Weight bearing as tolerated    CONSULTATIONS  None    PROCEDURES  None    LABORATORY  Lab Results   Component Value Date    SODIUM 141 10/18/2023    POTASSIUM 3.5 (L) 10/18/2023    CHLORIDE 106 10/18/2023    CO2 25 10/18/2023    GLUCOSE 109 (H) 10/18/2023    BUN 15  10/18/2023    CREATININE 0.96 10/18/2023        Lab Results   Component Value Date    WBC 8.1 10/18/2023    HEMOGLOBIN 10.0 (L) 10/18/2023    HEMATOCRIT 32.0 (L) 10/18/2023    PLATELETCT 216 10/18/2023        Total time of the discharge process = 39 minutes.

## 2023-10-18 NOTE — DISCHARGE PLANNING
"[facility] TCN chart review completed. Current discharge considerations are home with outpatient follow ups and home O2 if needed. Noted per MD cramer summary note from today, 10/18, \"PT recommended home health services, but patient declined services\" Per chart review, patient has FTF for home 02 in place at this time. DME choice is in media per review as well. TCN will continue to follow and collaborate with discharge planning team as additional post acute needs arise. Thank you.     Completed today:  PT recs on 10/18 \"Recommend home health for continued physical therapy services if pt agreeable; if son can drive, he may progress to outpatient PT\"  Choice obtained: DME(O2)  Patient refused TCN help scheduling hospital f/u appt and also refused [facility] referral    .  "

## 2023-10-18 NOTE — CARE PLAN
Pt axo4. Pt seen laying on bed at low-fowlers position awake. Pt able to make needs known. Pt on IV abx therapy. Pt afebrile. Pt denies pain. Due medications given as ordered. Pt able to change position. Able o ambulate with standby assist and with front wheeled walker well. Placed call light and belongings within reach and encouraged pt to increase oral fluid intake as tolerated.    The patient is Stable - Low risk of patient condition declining or worsening    Shift Goals  Clinical Goals: pt will eat all meals sitting up  Patient Goals: rleep and rest  Family Goals: jessenia    Progress made toward(s) clinical / shift goals:  pt will tolerate being weaned of o2 by end of shift. Pt will be free of injuries this shift.      Problem: Knowledge Deficit - Standard  Goal: Patient and family/care givers will demonstrate understanding of plan of care, disease process/condition, diagnostic tests and medications  Outcome: Progressing     Problem: Hemodynamics  Goal: Patient's hemodynamics, fluid balance and neurologic status will be stable or improve  Outcome: Progressing     Problem: Fluid Volume  Goal: Fluid volume balance will be maintained  Outcome: Progressing     Problem: Urinary - Renal Perfusion  Goal: Ability to achieve and maintain adequate renal perfusion and functioning will improve  Outcome: Progressing     Problem: Mechanical Ventilation  Goal: Safe management of artificial airway and ventilation  Outcome: Progressing  Goal: Successful weaning off mechanical ventilator, spontaneously maintains adequate gas exchange  Outcome: Progressing  Goal: Patient will be able to express needs and understand communication  Outcome: Progressing     Problem: Physical Regulation  Goal: Diagnostic test results will improve  Outcome: Progressing  Goal: Signs and symptoms of infection will decrease  Outcome: Progressing     Problem: Fall Risk  Goal: Patient will remain free from falls  Outcome: Progressing     Problem: Skin  Integrity  Goal: Skin integrity is maintained or improved  Outcome: Progressing     Problem: Pain - Standard  Goal: Alleviation of pain or a reduction in pain to the patient’s comfort goal  Outcome: Progressing       Patient is not progressing towards the following goals: pt did poorly on home o2 evaluation.      Problem: Respiratory  Goal: Patient will achieve/maintain optimum respiratory ventilation and gas exchange  Outcome: Not Met

## 2023-10-19 ENCOUNTER — HOME CARE VISIT (OUTPATIENT)
Dept: HOME HEALTH SERVICES | Facility: HOME HEALTHCARE | Age: 73
End: 2023-10-19
Payer: MEDICARE

## 2023-10-19 ENCOUNTER — PATIENT OUTREACH (OUTPATIENT)
Dept: MEDICAL GROUP | Facility: MEDICAL CENTER | Age: 73
End: 2023-10-19
Payer: MEDICARE

## 2023-10-19 NOTE — THERAPY
Physical Therapy   Daily Treatment     Patient Name: Ramesh Manjarrez  Age:  73 y.o., Sex:  male  Medical Record #: [id number]  Today's Date: 10/19/2023          Assessment       10/18/23 1510   Education Group   Additional Comments pt seen per pt request, reporting he has been up today and walking; no concerns for home; home health PT vs outpatietn discussed; pt feels he does not need any further PT despite the progress he has made; discussed needs to walk with family and move every hour awake with at least a sit<>stand

## 2023-10-19 NOTE — CASE COMMUNICATION
Quality Review for Transfer OASIS by KATE Waldrop RN on  October 19, 2023     Edits completed by KATE Waldrop RN:  1.  E is yes per the POC  2. Changed  to 10/15/23 date of inpatient admission.

## 2023-10-20 LAB
BACTERIA BLD CULT: NORMAL
BACTERIA BLD CULT: NORMAL
SIGNIFICANT IND 70042: NORMAL
SIGNIFICANT IND 70042: NORMAL
SITE SITE: NORMAL
SITE SITE: NORMAL
SOURCE SOURCE: NORMAL
SOURCE SOURCE: NORMAL

## 2023-10-20 NOTE — PROGRESS NOTES
Transitional Care Management  TCM Outreach Date and Time: Filed (10/19/2023 10:04 AM)    Discharge Questions  Actual Discharge Date: 10/18/23  Now that you are home, how are you feeling?: Good  Did you receive any new prescriptions?: Yes  Were you able to get them filled?: Yes  Meds to Bed or Pharmacy filled?: Pharmacy  Do you have any questions about your current medications or new medications (Review Med Rec)?: No  Do you have a follow up appointment scheduled with your PCP?: Yes  Appointment Date: 10/24/23  Appointment Time: 1300  Any issues or paperwork you wish to discuss with your PCP?: No  Does this patient qualify for the CCM program?: No    Transitional Care  Number of attempts made to contact patient: 2  Current or previous attempts competed within two business days of discharge? : Yes  Provided education regarding treatment plan, medications, self-management, ADLs?: Yes  Has patient completed an Advanced Directive?: No  Has the Care Manager's phone number provided?: No  Is there anything else I can help you with?: No    Discharge Summary  Chief Complaint: Fever, ALOC  Admitting Diagnosis: Sepsis ([facility]) (A41.9)  Discharge Diagnosis: Sepsis

## 2023-10-20 NOTE — CASE COMMUNICATION
I agree with changes.     Erika Patricio PT, DPT    ----- Message -----  From: Lisa Waldrop R.N.  Sent: 10/19/2023  12:11 PM PDT  To: Erika Patricio PT      Quality Review for Transfer OASIS by KATE Waldrop RN on  October 19, 2023     Edits completed by KATE Waldrop RN:  1.  E is yes per the POC  2. Changed  to 10/15/23 date of inpatient admission.

## 2023-10-23 ENCOUNTER — HOME CARE VISIT (OUTPATIENT)
Dept: HOME HEALTH SERVICES | Facility: HOME HEALTHCARE | Age: 73
End: 2023-10-23
Payer: MEDICARE

## 2023-10-24 ENCOUNTER — HOME CARE VISIT (OUTPATIENT)
Dept: HOME HEALTH SERVICES | Facility: HOME HEALTHCARE | Age: 73
End: 2023-10-24
Payer: MEDICARE

## 2023-10-24 ENCOUNTER — HOME HEALTH ADMISSION (OUTPATIENT)
Dept: HOME HEALTH SERVICES | Facility: HOME HEALTHCARE | Age: 73
End: 2023-10-24
Payer: MEDICARE

## 2023-10-24 ENCOUNTER — TELEMEDICINE (OUTPATIENT)
Dept: MEDICAL GROUP | Facility: MEDICAL CENTER | Age: 73
End: 2023-10-24
Payer: MEDICARE

## 2023-10-24 VITALS — HEIGHT: 67 IN | WEIGHT: 180 LBS | BODY MASS INDEX: 28.25 KG/M2

## 2023-10-24 DIAGNOSIS — Z09 HOSPITAL DISCHARGE FOLLOW-UP: Primary | ICD-10-CM

## 2023-10-24 DIAGNOSIS — S32.82XD MULTIPLE CLOSED FRACTURES OF PELVIS WITHOUT DISRUPTION OF PELVIC RING WITH ROUTINE HEALING, SUBSEQUENT ENCOUNTER: ICD-10-CM

## 2023-10-24 DIAGNOSIS — A41.9 SEPSIS, DUE TO UNSPECIFIED ORGANISM, UNSPECIFIED WHETHER ACUTE ORGAN DYSFUNCTION PRESENT (HCC): ICD-10-CM

## 2023-10-24 DIAGNOSIS — J96.01 ACUTE HYPOXIC RESPIRATORY FAILURE (HCC): ICD-10-CM

## 2023-10-24 PROBLEM — Z02.9 DISCHARGE PLANNING ISSUES: Status: RESOLVED | Noted: 2023-08-25 | Resolved: 2023-10-24

## 2023-10-24 PROBLEM — Z75.8 DISCHARGE PLANNING ISSUES: Status: RESOLVED | Noted: 2023-08-25 | Resolved: 2023-10-24

## 2023-10-24 PROCEDURE — 99495 TRANSJ CARE MGMT MOD F2F 14D: CPT | Mod: 95 | Performed by: STUDENT IN AN ORGANIZED HEALTH CARE EDUCATION/TRAINING PROGRAM

## 2023-10-24 ASSESSMENT — FIBROSIS 4 INDEX: FIB4 SCORE: 2.28

## 2023-10-24 NOTE — PROGRESS NOTES
Virtual Visit: Established Patient   This visit was conducted via Zoom using secure and encrypted videoconferencing technology.   The patient was in their home in the [facility].    The patient's identity was confirmed and verbal consent was obtained for this virtual visit.     Subjective:     Chief Complaint   Patient presents with    Hospital Follow-up     Sepsis      Ramesh Manjarrez is a 73 y.o. male who presents for Hospital Follow-up.    Transitional Care Management  TCM Outreach Date and Time: Filed (10/19/2023 10:04 AM)     Discharge Questions  Actual Discharge Date: 10/18/23  Now that you are home, how are you feeling?: Good  Did you receive any new prescriptions?: Yes  Were you able to get them filled?: Yes  Meds to Bed or Pharmacy filled?: Pharmacy  Do you have any questions about your current medications or new medications (Review Med Rec)?: No  Do you have a follow up appointment scheduled with your PCP?: Yes  Appointment Date: 10/24/23  Appointment Time: 1300  Any issues or paperwork you wish to discuss with your PCP?: No  Does this patient qualify for the CCM program?: No     Transitional Care  Number of attempts made to contact patient: 2  Current or previous attempts competed within two business days of discharge? : Yes  Provided education regarding treatment plan, medications, self-management, ADLs?: Yes  Has patient completed an Advanced Directive?: No  Has the Care Manager's phone number provided?: No  Is there anything else I can help you with?: No     Discharge Summary  Chief Complaint: Fever, ALOC  Admitting Diagnosis: Sepsis (HCC) (A41.9)  Discharge Diagnosis: Sepsis       HPI:   Recently hospitalized for     Patient was hospitalized 8/22 through 8/26 as a trauma consult after patient suffered multiple closed pelvic fractures without disruption of pelvic cervical.  Ortho recommended nonoperative management.  Patient was sent to [facility] for rehab    Patient hospitalized 10/15  "through 10/18 for sepsis.  Patient was admitted on 10/15, due to confusion urinalysis was positive for UTI.  COVID, flu, RSV negative, CT head no abnormality.  Chest x-ray showed bibasilar atelectasis.  Patient was started on antibiotics and IV fluids potassium was replaced.  Blood cultures remain negative.  Mentation improved.  Patient is oxygen dependent with chronic respiratory failure on 1 to 2 L at home, this remained stable throughout hospital visit.    Patient was discharged on amoxicillin 500 mg 3 times daily for 5 days.  Patient has completed antibiotics at this time.  Patient notes that he has been off oxygen for approximately 2 weeks.  Patient continues to check his oxygen saturations remaining around 96% every day.  Patient notes that he is feeling stronger, discharged from rehab facility to home    Patient has been discharged from [facility] services today 10/23/2023.    Patient is willing to work with physical therapist through home health.    Patient is not taking any medication.  Denies pain.    Ascending aortic aneurysm  patient with incidental finding of 4.5 cm ascending thoracic aortic aneurysm.     ROS:  Gen: no fevers/chill  Pulm: no sob, no cough  CV: no chest pain, no palpitations  GI: no nausea/vomiting, no diarrhea        Objective:   Ht 1.702 m (5' 7\") Comment: per pt  Wt 81.6 kg (180 lb) Comment: per pt  BMI 28.19 kg/m²     Physical Exam:  Constitutional: Alert, no distress, well-groomed.  Skin: No rashes in visible areas.  Eye: Round. Conjunctiva clear, lids normal. No icterus.   ENMT: Lips pink without lesions, good dentition, moist mucous membranes. Phonation normal.  Neck: No masses, no thyromegaly. Moves freely without pain.  Respiratory: Unlabored respiratory effort, no cough or audible wheeze  Psych: Alert and oriented x3, normal affect and mood.     Assessment and Plan:   The following treatment plan was discussed:     1. Sepsis, due to unspecified organism, unspecified " whether acute organ dysfunction present (HCC)  Acute, improved.  Patient completed antibiotics.  No longer having any symptoms, denies fever confusion, nausea, vomiting or any other symptoms at this time.  2. Acute hypoxic respiratory failure (HCC)  Acute, improved.  Patient no longer needing oxygen.  Continues to oxygen contraindications with pulse ox.  3. Multiple closed fractures of pelvis without disruption of pelvic ring with routine healing, subsequent encounter  - Referral to Home Health    - Chart and discharge summary were reviewed.   - Hospitalization and results reviewed with patient.   - Medications reviewed including instructions regarding high risk medications, dosing and side effects.  - Recommended Services: No services needed at this time  - Advance directive/POLST on file?  No     Follow-up: No follow-ups on file.

## 2023-10-28 ENCOUNTER — HOME CARE VISIT (OUTPATIENT)
Dept: HOME HEALTH SERVICES | Facility: HOME HEALTHCARE | Age: 73
End: 2023-10-28
Payer: MEDICARE

## 2023-10-28 VITALS
BODY MASS INDEX: 26.68 KG/M2 | SYSTOLIC BLOOD PRESSURE: 132 MMHG | TEMPERATURE: 98.7 F | OXYGEN SATURATION: 96 % | DIASTOLIC BLOOD PRESSURE: 78 MMHG | HEART RATE: 84 BPM | WEIGHT: 170 LBS | RESPIRATION RATE: 16 BRPM | HEIGHT: 67 IN

## 2023-10-28 PROCEDURE — 665001 SOC-HOME HEALTH

## 2023-10-28 PROCEDURE — G0495 RN CARE TRAIN/EDU IN HH: HCPCS

## 2023-10-28 ASSESSMENT — ENCOUNTER SYMPTOMS
BOWEL PATTERN NORMAL: 1
NAUSEA: DENIES
PAIN: 1
PAIN SEVERITY GOAL: 0/10
PAIN LOCATION - PAIN FREQUENCY: INTERMITTENT
HIGHEST PAIN SEVERITY IN PAST 24 HOURS: 8/10
PAIN LOCATION - PAIN QUALITY: ACHE
PAIN LOCATION - EXACERBATING FACTORS: MOVEMENT
SUBJECTIVE PAIN PROGRESSION: GRADUALLY IMPROVING
PAIN LOCATION - RELIEVING FACTORS: REST
PERSON REPORTING PAIN: PATIENT
VOMITING: DENIES
PAIN LOCATION - PAIN DURATION: ACUTE
LOWEST PAIN SEVERITY IN PAST 24 HOURS: 0/10
PAIN LOCATION: LOWER BACK
LAST BOWEL MOVEMENT: 66775
PAIN LOCATION - PAIN SEVERITY: 3/10
STOOL FREQUENCY: DAILY

## 2023-10-28 ASSESSMENT — ACTIVITIES OF DAILY LIVING (ADL): OASIS_M1830: 03

## 2023-10-28 ASSESSMENT — FIBROSIS 4 INDEX: FIB4 SCORE: 2.28

## 2023-10-30 ENCOUNTER — DOCUMENTATION (OUTPATIENT)
Dept: MEDICAL GROUP | Facility: PHYSICIAN GROUP | Age: 73
End: 2023-10-30

## 2023-10-30 PROCEDURE — G0180 MD CERTIFICATION HHA PATIENT: HCPCS | Performed by: STUDENT IN AN ORGANIZED HEALTH CARE EDUCATION/TRAINING PROGRAM

## 2023-10-30 NOTE — PROGRESS NOTES
Medication chart review for [facility] services    Received referral from [facility].   Medications reviewed  compared with discharge summary if available.  Discharge summary date, if applicable:   10/23    Current medication list per [facility]     Medication list one, patient is currently taking    Current Outpatient Medications:     artificial tears, 2 Application , Both Eyes, QDAY PRN    acetaminophen, 650 mg, Oral, Q8HRS PRN      Medication list two, drugs that the patient has been prescribed or recommended to take by their healthcare provider on discharge summary     START taking these medications         Instructions   amoxicillin 500 MG Caps  Commonly known as: Amoxil    Take 1 Capsule by mouth 3 times a day for 5 days.  Dose: 500 mg                CONTINUE taking these medications         Instructions   Tylenol 8 Hour Arthritis Pain 650 MG CR tablet  Generic drug: acetaminophen    Take 650 mg by mouth every 8 hours as needed for Mild Pain or Moderate Pain. Indications: Pain  Dose: 650 mg                  Allergies   Allergen Reactions    Aspirin Unspecified     Worsening tinnitus  Other reaction(s): Other (See Comments)  TINNITUS      Codeine      Other reaction(s): Unknown       Labs     Lab Results   Component Value Date/Time    SODIUM 141 10/18/2023 03:22 AM    POTASSIUM 3.5 (L) 10/18/2023 03:22 AM    CHLORIDE 106 10/18/2023 03:22 AM    CO2 25 10/18/2023 03:22 AM    GLUCOSE 109 (H) 10/18/2023 03:22 AM    BUN 15 10/18/2023 03:22 AM    CREATININE 0.96 10/18/2023 03:22 AM     Lab Results   Component Value Date/Time    ALKPHOSPHAT 92 10/16/2023 03:11 AM    ASTSGOT 20 10/16/2023 03:11 AM    ALTSGPT 9 10/16/2023 03:11 AM    TBILIRUBIN 1.4 10/16/2023 03:11 AM    ALBUMIN 3.3 10/16/2023 03:11 AM        Assessment for clinically significant drug interactions, drug omissions/additions, duplicative therapies.            CC   Armaan Koch D.O.  [address] [place] [address]  [place] [phone]  Fax:  [phone]    [facility]  Phone [phone] fax [phone]    This note was created using voice recognition software (Dragon). The accuracy of the dictation is limited by the abilities of the software. I have reviewed the note prior to signing, however some errors in grammar and context are still possible. If you have any questions related to this note please do not hesitate to contact our office.

## 2023-10-31 ENCOUNTER — HOME CARE VISIT (OUTPATIENT)
Dept: HOME HEALTH SERVICES | Facility: HOME HEALTHCARE | Age: 73
End: 2023-10-31
Payer: MEDICARE

## 2023-10-31 VITALS
TEMPERATURE: 98.4 F | SYSTOLIC BLOOD PRESSURE: 132 MMHG | RESPIRATION RATE: 18 BRPM | HEART RATE: 89 BPM | OXYGEN SATURATION: 97 % | DIASTOLIC BLOOD PRESSURE: 78 MMHG

## 2023-10-31 PROCEDURE — G0151 HHCP-SERV OF PT,EA 15 MIN: HCPCS

## 2023-11-05 SDOH — ECONOMIC STABILITY: HOUSING INSECURITY: HOME SAFETY: PT LIVES WITH KIDS

## 2023-11-05 SDOH — ECONOMIC STABILITY: HOUSING INSECURITY: EVIDENCE OF SMOKING MATERIAL: 0

## 2023-11-05 ASSESSMENT — ACTIVITIES OF DAILY LIVING (ADL)
CURRENT_FUNCTION: SUPERVISION
AMBULATION ASSISTANCE: SUPERVISION
AMBULATION_DISTANCE/DURATION_TOLERATED: 25FT
BATHING_WITHIN_DEFINED_LIMITS: 1
GROOMING_WITHIN_DEFINED_LIMITS: 1
AMBULATION ASSISTANCE ON FLAT SURFACES: 1
PHYSICAL TRANSFERS ASSESSED: 1
AMBULATION ASSISTANCE: 1
PHYSICAL_TRANSFERS_DEVICES: 4WW
FEEDING_WITHIN_DEFINED_LIMITS: 1

## 2023-11-05 ASSESSMENT — BALANCE ASSESSMENTS
BALANCE SCORE: 9
SITTING DOWN: 1 - USES ARMS OR NOT SMOOTH MOTION
ATTEMPTS TO ARISE: 1 - ABLE, REQUIRES MORE THAN ONE ATTEMPT
ARISING SCORE: 1
IMMEDIATE STANDING BALANCE FIRST 5 SECONDS: 1 - STEADY BUT USES WALKER OR OTHER SUPPORT
ARISES: 1 - ABLE, USES ARMS TO HELP
EYES CLOSED AT MAXIMUM POSITION NUDGED: 0 - UNSTEADY
STANDING BALANCE: 1 - STEADY BUT WIDE STANCE AND USES CANE OR OTHER SUPPORT
SITTING BALANCE: 1 - STEADY, SAFE
NUDGED SCORE: 2
TURNING 360 DEGREES STEPS: 0 - DISCONTINUOUS STEPS
NUDGED: 2 - STEADY

## 2023-11-05 ASSESSMENT — GAIT ASSESSMENTS
INITIATION OF GAIT IMMEDIATELY AFTER GO: 1 - NO HESITANCY
BALANCE AND GAIT SCORE: 16
STEP SYMMETRY: 1 - RIGHT AND LEFT STEP LENGTH APPEAR EQUAL
STEP CONTINUITY: 0 - STOPPING OR DISCONTINUITY BETWEEN STEPS
TRUNK: 0 - MARKED SWAY OR USES WALKING AID
PATH: 1 - MILD/MODERATE DEVIATION OR USES WALKING AID
WALKING STANCE: 0 - HEELS APART
TRUNK SCORE: 0
GAIT SCORE: 7
PATH SCORE: 1

## 2023-11-05 ASSESSMENT — ENCOUNTER SYMPTOMS
ARTHRALGIAS: 1
MUSCLE WEAKNESS: 1

## 2023-11-06 ENCOUNTER — HOME CARE VISIT (OUTPATIENT)
Dept: HOME HEALTH SERVICES | Facility: HOME HEALTHCARE | Age: 73
End: 2023-11-06
Payer: MEDICARE

## 2023-11-06 VITALS
DIASTOLIC BLOOD PRESSURE: 82 MMHG | OXYGEN SATURATION: 98 % | HEART RATE: 95 BPM | SYSTOLIC BLOOD PRESSURE: 138 MMHG | TEMPERATURE: 98.5 F | RESPIRATION RATE: 16 BRPM

## 2023-11-06 PROCEDURE — G0151 HHCP-SERV OF PT,EA 15 MIN: HCPCS

## 2023-11-06 NOTE — CASE COMMUNICATION
I agree with these changes.  ----- Message -----  From: Lisa Waldrop R.N.  Sent: 11/6/2023   9:57 AM PST  To: Sherry Cutler R.N.      Quality Review for SOC OASIS by KATE Waldrop RN on  November 6, 2023, 2023     Edits completed by KATE Waldrop RN:  1.  and  diagnosis coding updated per chart review.  2. Changed  to 10/28/23 per the LSOC order  3. Changed  to short stay acute hospital with a inpatient  discharge date () per Epic  4. Resolved all SN care plan problems as SN visits are only for 1w1  5.  is 7 per care plan therapy sets.    [FreeTextEntry1] : Follow Up [de-identified] : 43 Year old female with past medical history of hypothyroidism comes for a follow up. last seen in the clinic 4/18/2019 after a fall. on 4/31/2019 pt has been to Ed for abdominal pain and N/V and found to have sigmoid diverticulitis on CT scan, d/c on PO cipro and flagyl with resolution of symptoms. pt has no complains today, endorses marked improvement in the shoulder pain with diclofenac gel. no complains.

## 2023-11-06 NOTE — CASE COMMUNICATION
Quality Review for SOC OASIS by KATE Waldrop RN on  November 6, 2023, 2023     Edits completed by KATE Waldrop RN:  1.  and  diagnosis coding updated per chart review.  2. Changed  to 10/28/23 per the LSOC order  3. Changed  to short stay acute hospital with a inpatient discharge date () per Epic  4. Resolved all SN care plan problems as SN visits are only for 1w1  5.  is 7 per care plan therapy sets.

## 2023-11-08 ENCOUNTER — HOME CARE VISIT (OUTPATIENT)
Dept: HOME HEALTH SERVICES | Facility: HOME HEALTHCARE | Age: 73
End: 2023-11-08
Payer: MEDICARE

## 2023-11-08 VITALS
TEMPERATURE: 98.8 F | RESPIRATION RATE: 18 BRPM | HEART RATE: 95 BPM | OXYGEN SATURATION: 98 % | SYSTOLIC BLOOD PRESSURE: 134 MMHG | DIASTOLIC BLOOD PRESSURE: 78 MMHG

## 2023-11-08 PROCEDURE — G0151 HHCP-SERV OF PT,EA 15 MIN: HCPCS

## 2023-11-13 ENCOUNTER — HOME CARE VISIT (OUTPATIENT)
Dept: HOME HEALTH SERVICES | Facility: HOME HEALTHCARE | Age: 73
End: 2023-11-13
Payer: MEDICARE

## 2023-11-13 VITALS
HEART RATE: 83 BPM | RESPIRATION RATE: 16 BRPM | TEMPERATURE: 99 F | OXYGEN SATURATION: 99 % | DIASTOLIC BLOOD PRESSURE: 80 MMHG | SYSTOLIC BLOOD PRESSURE: 140 MMHG

## 2023-11-13 PROCEDURE — G0151 HHCP-SERV OF PT,EA 15 MIN: HCPCS

## 2023-11-15 ENCOUNTER — HOME CARE VISIT (OUTPATIENT)
Dept: HOME HEALTH SERVICES | Facility: HOME HEALTHCARE | Age: 73
End: 2023-11-15
Payer: MEDICARE

## 2023-11-15 VITALS
TEMPERATURE: 98.8 F | HEART RATE: 85 BPM | RESPIRATION RATE: 18 BRPM | OXYGEN SATURATION: 94 % | SYSTOLIC BLOOD PRESSURE: 128 MMHG | DIASTOLIC BLOOD PRESSURE: 90 MMHG

## 2023-11-15 PROCEDURE — G0151 HHCP-SERV OF PT,EA 15 MIN: HCPCS

## 2023-11-20 ENCOUNTER — HOME CARE VISIT (OUTPATIENT)
Dept: HOME HEALTH SERVICES | Facility: HOME HEALTHCARE | Age: 73
End: 2023-11-20
Payer: MEDICARE

## 2023-11-20 VITALS
SYSTOLIC BLOOD PRESSURE: 148 MMHG | TEMPERATURE: 98.7 F | HEART RATE: 93 BPM | OXYGEN SATURATION: 98 % | DIASTOLIC BLOOD PRESSURE: 92 MMHG | RESPIRATION RATE: 18 BRPM

## 2023-11-20 PROCEDURE — G0151 HHCP-SERV OF PT,EA 15 MIN: HCPCS

## 2023-11-27 ENCOUNTER — HOME CARE VISIT (OUTPATIENT)
Dept: HOME HEALTH SERVICES | Facility: HOME HEALTHCARE | Age: 73
End: 2023-11-27
Payer: MEDICARE

## 2023-11-27 VITALS
SYSTOLIC BLOOD PRESSURE: 128 MMHG | HEART RATE: 81 BPM | OXYGEN SATURATION: 98 % | DIASTOLIC BLOOD PRESSURE: 80 MMHG | RESPIRATION RATE: 18 BRPM | TEMPERATURE: 98.5 F

## 2023-11-27 PROCEDURE — 665001 SOC-HOME HEALTH

## 2023-11-27 PROCEDURE — G0151 HHCP-SERV OF PT,EA 15 MIN: HCPCS

## 2023-11-28 SDOH — ECONOMIC STABILITY: HOUSING INSECURITY: EVIDENCE OF SMOKING MATERIAL: 0

## 2023-11-28 ASSESSMENT — ACTIVITIES OF DAILY LIVING (ADL)
AMBULATION ASSISTANCE: 1
CURRENT_FUNCTION: INDEPENDENT
AMBULATION ASSISTANCE ON UNEVEN SURFACES: 1
AMBULATION ASSISTANCE: SUPERVISION
PHYSICAL TRANSFERS ASSESSED: 1
AMBULATION ASSISTANCE ON FLAT SURFACES: 1

## 2023-11-28 ASSESSMENT — GAIT ASSESSMENTS
INITIATION OF GAIT IMMEDIATELY AFTER GO: 1 - NO HESITANCY
TRUNK: 0 - MARKED SWAY OR USES WALKING AID
PATH SCORE: 1
STEP CONTINUITY: 1 - STEPS APPEAR CONTINUOUS
TRUNK SCORE: 0
WALKING STANCE: 1 - HEELS ALMOST TOUCHING WHILE WALKING
PATH: 1 - MILD/MODERATE DEVIATION OR USES WALKING AID
BALANCE AND GAIT SCORE: 23
STEP SYMMETRY: 1 - RIGHT AND LEFT STEP LENGTH APPEAR EQUAL
GAIT SCORE: 9

## 2023-11-28 ASSESSMENT — BALANCE ASSESSMENTS
NUDGED SCORE: 2
TURNING 360 DEGREES STEPS: 1 - CONTINUOUS STEPS
SITTING DOWN: 1 - USES ARMS OR NOT SMOOTH MOTION
SITTING BALANCE: 1 - STEADY, SAFE
IMMEDIATE STANDING BALANCE FIRST 5 SECONDS: 2 - STEADY WITHOUT WALKER OR OTHER SUPPORT
ARISING SCORE: 1
ATTEMPTS TO ARISE: 2 - ABLE TO RISE, ONE ATTEMPT
NUDGED: 2 - STEADY
ARISES: 1 - ABLE, USES ARMS TO HELP
EYES CLOSED AT MAXIMUM POSITION NUDGED: 1 - STEADY
STANDING BALANCE: 2 - NARROW STANCE WITHOUT SUPPORT
BALANCE SCORE: 14

## 2023-12-04 ENCOUNTER — HOME CARE VISIT (OUTPATIENT)
Dept: HOME HEALTH SERVICES | Facility: HOME HEALTHCARE | Age: 73
End: 2023-12-04
Payer: MEDICARE

## 2023-12-04 VITALS
SYSTOLIC BLOOD PRESSURE: 132 MMHG | OXYGEN SATURATION: 97 % | DIASTOLIC BLOOD PRESSURE: 78 MMHG | RESPIRATION RATE: 16 BRPM | TEMPERATURE: 98.6 F | HEART RATE: 77 BPM

## 2023-12-04 PROCEDURE — G0151 HHCP-SERV OF PT,EA 15 MIN: HCPCS

## 2023-12-11 ENCOUNTER — HOME CARE VISIT (OUTPATIENT)
Dept: HOME HEALTH SERVICES | Facility: HOME HEALTHCARE | Age: 73
End: 2023-12-11
Payer: MEDICARE

## 2023-12-11 VITALS
OXYGEN SATURATION: 98 % | SYSTOLIC BLOOD PRESSURE: 132 MMHG | RESPIRATION RATE: 18 BRPM | TEMPERATURE: 98.6 F | DIASTOLIC BLOOD PRESSURE: 86 MMHG | HEART RATE: 71 BPM

## 2023-12-11 PROCEDURE — G0151 HHCP-SERV OF PT,EA 15 MIN: HCPCS

## 2023-12-18 ENCOUNTER — HOME CARE VISIT (OUTPATIENT)
Dept: HOME HEALTH SERVICES | Facility: HOME HEALTHCARE | Age: 73
End: 2023-12-18
Payer: MEDICARE

## 2023-12-18 VITALS
SYSTOLIC BLOOD PRESSURE: 142 MMHG | HEART RATE: 91 BPM | TEMPERATURE: 99 F | DIASTOLIC BLOOD PRESSURE: 82 MMHG | RESPIRATION RATE: 18 BRPM | OXYGEN SATURATION: 98 %

## 2023-12-18 PROCEDURE — G0151 HHCP-SERV OF PT,EA 15 MIN: HCPCS

## 2023-12-19 ASSESSMENT — ACTIVITIES OF DAILY LIVING (ADL)
OASIS_M1830: 01
HOME_HEALTH_OASIS: 00

## 2023-12-19 ASSESSMENT — BALANCE ASSESSMENTS
STANDING BALANCE: 2 - NARROW STANCE WITHOUT SUPPORT
ARISING SCORE: 1
BALANCE SCORE: 14
TURNING 360 DEGREES STEPS: 1 - CONTINUOUS STEPS
ARISES: 1 - ABLE, USES ARMS TO HELP
NUDGED: 2 - STEADY
ATTEMPTS TO ARISE: 2 - ABLE TO RISE, ONE ATTEMPT
NUDGED SCORE: 2
EYES CLOSED AT MAXIMUM POSITION NUDGED: 1 - STEADY
IMMEDIATE STANDING BALANCE FIRST 5 SECONDS: 2 - STEADY WITHOUT WALKER OR OTHER SUPPORT
SITTING BALANCE: 1 - STEADY, SAFE
SITTING DOWN: 1 - USES ARMS OR NOT SMOOTH MOTION

## 2023-12-19 ASSESSMENT — GAIT ASSESSMENTS
WALKING STANCE: 1 - HEELS ALMOST TOUCHING WHILE WALKING
BALANCE AND GAIT SCORE: 24
PATH: 1 - MILD/MODERATE DEVIATION OR USES WALKING AID
PATH SCORE: 1
GAIT SCORE: 10
STEP SYMMETRY: 1 - RIGHT AND LEFT STEP LENGTH APPEAR EQUAL
INITIATION OF GAIT IMMEDIATELY AFTER GO: 1 - NO HESITANCY
STEP CONTINUITY: 1 - STEPS APPEAR CONTINUOUS
TRUNK: 1 - NO SWAY BUT FLEXION OF KNEES OR BACK OR SPREADS ARMS WHILE WALKING
TRUNK SCORE: 1

## 2024-02-12 ENCOUNTER — TELEPHONE (OUTPATIENT)
Dept: MEDICAL GROUP | Facility: MEDICAL CENTER | Age: 74
End: 2024-02-12
Payer: MEDICARE

## 2024-02-12 NOTE — TELEPHONE ENCOUNTER
Caller Name: Ramesh Manjarrez   Call Back Number: [phone]     PT called and LVM stating that he hasn't been using his oxygen generator and collecting dust. He wanted to know what he should do with it or return it. He report that his levels are around 94% to 96% and the lowest being 90% sometime.

## 2024-02-20 PROBLEM — G93.40 ACUTE ENCEPHALOPATHY: Status: RESOLVED | Noted: 2023-10-15 | Resolved: 2024-02-20

## 2024-02-20 PROBLEM — N17.9 ACUTE KIDNEY INJURY (HCC): Status: RESOLVED | Noted: 2023-10-15 | Resolved: 2024-02-20

## 2024-02-20 PROBLEM — J96.01 ACUTE HYPOXIC RESPIRATORY FAILURE (HCC): Status: RESOLVED | Noted: 2023-10-15 | Resolved: 2024-02-20

## 2024-02-21 ENCOUNTER — OFFICE VISIT (OUTPATIENT)
Dept: MEDICAL GROUP | Facility: MEDICAL CENTER | Age: 74
End: 2024-02-21
Payer: MEDICARE

## 2024-02-21 VITALS
SYSTOLIC BLOOD PRESSURE: 142 MMHG | TEMPERATURE: 97.5 F | OXYGEN SATURATION: 97 % | RESPIRATION RATE: 16 BRPM | HEIGHT: 66 IN | WEIGHT: 183 LBS | DIASTOLIC BLOOD PRESSURE: 80 MMHG | HEART RATE: 76 BPM | BODY MASS INDEX: 29.41 KG/M2

## 2024-02-21 DIAGNOSIS — N40.0 BPH WITH ELEVATED PSA: ICD-10-CM

## 2024-02-21 DIAGNOSIS — D64.9 NORMOCYTIC ANEMIA: ICD-10-CM

## 2024-02-21 DIAGNOSIS — E78.5 HYPERLIPIDEMIA, UNSPECIFIED HYPERLIPIDEMIA TYPE: ICD-10-CM

## 2024-02-21 DIAGNOSIS — R97.20 BPH WITH ELEVATED PSA: ICD-10-CM

## 2024-02-21 DIAGNOSIS — I12.9 HYPERTENSIVE KIDNEY DISEASE WITH CHRONIC KIDNEY DISEASE STAGE II: ICD-10-CM

## 2024-02-21 DIAGNOSIS — Z80.8 FAMILY HISTORY OF MELANOMA: ICD-10-CM

## 2024-02-21 DIAGNOSIS — B35.6 JOCK ITCH: ICD-10-CM

## 2024-02-21 DIAGNOSIS — N18.2 HYPERTENSIVE KIDNEY DISEASE WITH CHRONIC KIDNEY DISEASE STAGE II: ICD-10-CM

## 2024-02-21 DIAGNOSIS — K21.9 GASTROESOPHAGEAL REFLUX DISEASE, UNSPECIFIED WHETHER ESOPHAGITIS PRESENT: ICD-10-CM

## 2024-02-21 DIAGNOSIS — I70.0 AORTIC ATHEROSCLEROSIS (HCC): ICD-10-CM

## 2024-02-21 DIAGNOSIS — S32.82XD MULTIPLE CLOSED FRACTURES OF PELVIS WITHOUT DISRUPTION OF PELVIC RING WITH ROUTINE HEALING, SUBSEQUENT ENCOUNTER: ICD-10-CM

## 2024-02-21 PROBLEM — A41.9 SEPSIS (HCC): Status: RESOLVED | Noted: 2023-10-15 | Resolved: 2024-02-21

## 2024-02-21 PROBLEM — D69.2 SENILE PURPURA (HCC): Status: ACTIVE | Noted: 2017-10-10

## 2024-02-21 PROBLEM — T14.90XA TRAUMA: Status: RESOLVED | Noted: 2023-08-22 | Resolved: 2024-02-21

## 2024-02-21 PROBLEM — H47.20 OPTIC ATROPHY: Status: ACTIVE | Noted: 2020-01-13

## 2024-02-21 PROBLEM — E87.6 HYPOKALEMIA: Status: RESOLVED | Noted: 2023-10-17 | Resolved: 2024-02-21

## 2024-02-21 PROBLEM — I77.1 TORTUOUS AORTA (HCC): Status: ACTIVE | Noted: 2019-02-04

## 2024-02-21 PROBLEM — E80.6 HYPERBILIRUBINEMIA: Status: RESOLVED | Noted: 2023-10-15 | Resolved: 2024-02-21

## 2024-02-21 PROBLEM — N39.0 UTI (URINARY TRACT INFECTION): Status: RESOLVED | Noted: 2023-10-16 | Resolved: 2024-02-21

## 2024-02-21 PROBLEM — E83.39 HYPOPHOSPHATEMIA: Status: RESOLVED | Noted: 2023-10-15 | Resolved: 2024-02-21

## 2024-02-21 PROBLEM — J98.11 BILATERAL ATELECTASIS: Status: RESOLVED | Noted: 2023-08-23 | Resolved: 2024-02-21

## 2024-02-21 PROBLEM — H18.519 FUCHS' CORNEAL DYSTROPHY: Status: ACTIVE | Noted: 2020-01-13

## 2024-02-21 PROBLEM — I73.9 PVD (PERIPHERAL VASCULAR DISEASE) (HCC): Status: ACTIVE | Noted: 2022-04-20

## 2024-02-21 PROBLEM — H52.203 HYPEROPIC ASTIGMATISM OF BOTH EYES: Status: ACTIVE | Noted: 2020-12-11

## 2024-02-21 PROCEDURE — 99214 OFFICE O/P EST MOD 30 MIN: CPT | Performed by: FAMILY MEDICINE

## 2024-02-21 PROCEDURE — 3079F DIAST BP 80-89 MM HG: CPT | Performed by: FAMILY MEDICINE

## 2024-02-21 PROCEDURE — 3077F SYST BP >= 140 MM HG: CPT | Performed by: FAMILY MEDICINE

## 2024-02-21 RX ORDER — NYSTATIN 100000 [USP'U]/G
1 POWDER TOPICAL 3 TIMES DAILY
Qty: 60 G | Refills: 2 | Status: SHIPPED | OUTPATIENT
Start: 2024-02-21

## 2024-02-21 ASSESSMENT — PATIENT HEALTH QUESTIONNAIRE - PHQ9
SUM OF ALL RESPONSES TO PHQ9 QUESTIONS 1 AND 2: 0
8. MOVING OR SPEAKING SO SLOWLY THAT OTHER PEOPLE COULD HAVE NOTICED. OR THE OPPOSITE, BEING SO FIGETY OR RESTLESS THAT YOU HAVE BEEN MOVING AROUND A LOT MORE THAN USUAL: NOT AT ALL
5. POOR APPETITE OR OVEREATING: NOT AT ALL
9. THOUGHTS THAT YOU WOULD BE BETTER OFF DEAD, OR OF HURTING YOURSELF: NOT AT ALL
3. TROUBLE FALLING OR STAYING ASLEEP OR SLEEPING TOO MUCH: NOT AT ALL
1. LITTLE INTEREST OR PLEASURE IN DOING THINGS: NOT AT ALL
2. FEELING DOWN, DEPRESSED, IRRITABLE, OR HOPELESS: NOT AT ALL
SUM OF ALL RESPONSES TO PHQ QUESTIONS 1-9: 0
4. FEELING TIRED OR HAVING LITTLE ENERGY: NOT AT ALL
6. FEELING BAD ABOUT YOURSELF - OR THAT YOU ARE A FAILURE OR HAVE LET YOURSELF OR YOUR FAMILY DOWN: NOT AL ALL
7. TROUBLE CONCENTRATING ON THINGS, SUCH AS READING THE NEWSPAPER OR WATCHING TELEVISION: NOT AT ALL

## 2024-02-21 ASSESSMENT — FIBROSIS 4 INDEX: FIB4 SCORE: 2.31

## 2024-02-21 NOTE — PROGRESS NOTES
"Subjective:     CC: \"oxygen and itching\"    HPI:   Ramesh presents today with:    Has been a year since last visit  He has been in the hospital 3 times due to UTI with sepsis  Still taking his vitamins  Oxygen seems to be OK  His home SpO2 at night is low 90's rarely hist 88-89%  He does have an oxygen machine  He has been diagnosed with sleep apnea in the distant past, he does not want to do a home sleep test  He hasn't needed his home oxygen in several months  He does have portable oxygen concentrator    Itchiness in groin and really different locations of his body  Tried changing soaps  No rash that he notices  Goes away for a little bit after using wipes    Tells me he drinks a fair amount of water  He urinates a few times a night  Sometimes first thing in the morning it is difficult to go  Feels like his symptoms are relatively stable and normal for his age  He has had high PSA's in the past and has had imaging and biopsy previously  He does not wish to go through that again and does not want his PSA checked, he feels if he has prostate cancer he will die of something else first anyway    His brother had melanoma recently removed from his ear  Patient does not have any specific lesions he is worried about but does want a skin check    He is nearly complete with his PT since his traumatic fractures earlier in the year  Patient thinks he is ready to work out on his own        Current Outpatient Medications Ordered in Epic   Medication Sig Dispense Refill    nystatin (MYCOSTATIN) powder Apply 1 g topically 3 times a day. 60 g 2    artificial tears (EYE LUBRICANT) Ointment ophth ointment Apply 2 Applications to both eyes 1 time a day as needed (dry eyes). Indications: dry eyes      acetaminophen (TYLENOL 8 HOUR ARTHRITIS PAIN) 650 MG CR tablet Take 650 mg by mouth every 8 hours as needed for Mild Pain or Moderate Pain. Indications: Pain (Patient not taking: Reported on 2/21/2024)       No current Epic-ordered " "facility-administered medications on file.       Health Maintenance: Completed    ROS:  ROS see HPI    Objective:     Exam:  BP (!) 142/80   Pulse 76   Temp 36.4 °C (97.5 °F) (Temporal)   Resp 16   Ht 1.676 m (5' 6\")   Wt 83 kg (183 lb)   SpO2 97%   BMI 29.54 kg/m²  Body mass index is 29.54 kg/m².    Physical Exam  Vitals reviewed.   Constitutional:       General: He is not in acute distress.     Appearance: Normal appearance. He is obese.   HENT:      Head: Normocephalic and atraumatic.   Cardiovascular:      Rate and Rhythm: Normal rate and regular rhythm.      Pulses:           Dorsalis pedis pulses are 2+ on the right side and 2+ on the left side.      Heart sounds: Normal heart sounds.   Pulmonary:      Effort: Pulmonary effort is normal. No respiratory distress.      Breath sounds: Normal breath sounds.   Genitourinary:     Penis: Normal.       Testes: Normal.      Comments: No overt inguinal rash but does have some signs of irritation around scrotum  Feet:      Right foot:      Skin integrity: Skin integrity normal.      Toenail Condition: Right toenails are normal.      Left foot:      Skin integrity: Skin integrity normal.      Toenail Condition: Left toenails are normal.      Comments: Varicose veins in feet  Skin:     General: Skin is warm and dry.   Neurological:      Mental Status: He is alert. Mental status is at baseline.      Gait: Gait abnormal (uses cane).   Psychiatric:         Mood and Affect: Mood normal.         Behavior: Behavior normal.             Assessment & Plan:     74 y.o. male with the following -     1. Jock itch  Did not see obvious rash but most common cause for location will see if powder improves his symptoms  - nystatin (MYCOSTATIN) powder; Apply 1 g topically 3 times a day.  Dispense: 60 g; Refill: 2    2. Gastroesophageal reflux disease, unspecified whether esophagitis present  - CBC WITH DIFFERENTIAL; Future  - Comp Metabolic Panel; Future    3. Family history of " melanoma  - Referral to Dermatology    4. Multiple closed fractures of pelvis without disruption of pelvic ring with routine healing, subsequent encounter  Doing well overall. He is about to be released from PT, discussed getting a home exercise program from them to continue on his own.    5. Hyperlipidemia, unspecified hyperlipidemia type  - Comp Metabolic Panel; Future  - Lipid Profile; Future    6. Aortic atherosclerosis (HCC)  - Lipid Profile; Future    7. Normocytic anemia  - CBC WITH DIFFERENTIAL; Future    8. Hypertensive kidney disease with chronic kidney disease stage II  BP mildly elevated on presentation  Patient not big on taking medication  Will see what trend is and check kidney function  - Comp Metabolic Panel; Future  - MICROALBUMIN CREAT RATIO URINE; Future    9. BPH with elevated PSA  Patient very adamant about not getting any more PSA's  Discussed that is fine, if his symptoms were to change we can reconsider  Also discussed that means he has to be OK if something grows and there is no early warning.      Return in about 3 months (around 5/21/2024) for Annual Medicare, Lab F/U.    Please note that this dictation was created using voice recognition software. I have made every reasonable attempt to correct obvious errors, but I expect that there are errors of grammar and possibly content that I did not discover before finalizing the note.

## 2024-04-03 ENCOUNTER — DOCUMENTATION (OUTPATIENT)
Dept: HEALTH INFORMATION MANAGEMENT | Facility: OTHER | Age: 74
End: 2024-04-03
Payer: MEDICARE

## 2024-04-24 ENCOUNTER — TELEPHONE (OUTPATIENT)
Dept: HEALTH INFORMATION MANAGEMENT | Facility: OTHER | Age: 74
End: 2024-04-24
Payer: MEDICARE

## 2024-05-15 ENCOUNTER — HOSPITAL ENCOUNTER (OUTPATIENT)
Dept: LAB | Facility: MEDICAL CENTER | Age: 74
End: 2024-05-15
Attending: FAMILY MEDICINE
Payer: MEDICARE

## 2024-05-15 DIAGNOSIS — N18.2 HYPERTENSIVE KIDNEY DISEASE WITH CHRONIC KIDNEY DISEASE STAGE II: ICD-10-CM

## 2024-05-15 DIAGNOSIS — E78.5 HYPERLIPIDEMIA, UNSPECIFIED HYPERLIPIDEMIA TYPE: ICD-10-CM

## 2024-05-15 DIAGNOSIS — I12.9 HYPERTENSIVE KIDNEY DISEASE WITH CHRONIC KIDNEY DISEASE STAGE II: ICD-10-CM

## 2024-05-15 DIAGNOSIS — D64.9 NORMOCYTIC ANEMIA: ICD-10-CM

## 2024-05-15 DIAGNOSIS — K21.9 GASTROESOPHAGEAL REFLUX DISEASE, UNSPECIFIED WHETHER ESOPHAGITIS PRESENT: ICD-10-CM

## 2024-05-15 DIAGNOSIS — I70.0 AORTIC ATHEROSCLEROSIS (HCC): ICD-10-CM

## 2024-05-15 LAB
ALBUMIN SERPL BCP-MCNC: 4.2 G/DL (ref 3.2–4.9)
ALBUMIN/GLOB SERPL: 1.8 G/DL
ALP SERPL-CCNC: 61 U/L (ref 30–99)
ALT SERPL-CCNC: 11 U/L (ref 2–50)
ANION GAP SERPL CALC-SCNC: 11 MMOL/L (ref 7–16)
AST SERPL-CCNC: 15 U/L (ref 12–45)
BASOPHILS # BLD AUTO: 0.9 % (ref 0–1.8)
BASOPHILS # BLD: 0.06 K/UL (ref 0–0.12)
BILIRUB SERPL-MCNC: 0.9 MG/DL (ref 0.1–1.5)
BUN SERPL-MCNC: 14 MG/DL (ref 8–22)
CALCIUM ALBUM COR SERPL-MCNC: 9 MG/DL (ref 8.5–10.5)
CALCIUM SERPL-MCNC: 9.2 MG/DL (ref 8.5–10.5)
CHLORIDE SERPL-SCNC: 108 MMOL/L (ref 96–112)
CHOLEST SERPL-MCNC: 185 MG/DL (ref 100–199)
CO2 SERPL-SCNC: 24 MMOL/L (ref 20–33)
CREAT SERPL-MCNC: 1.18 MG/DL (ref 0.5–1.4)
CREAT UR-MCNC: 170.36 MG/DL
EOSINOPHIL # BLD AUTO: 0.14 K/UL (ref 0–0.51)
EOSINOPHIL NFR BLD: 2 % (ref 0–6.9)
ERYTHROCYTE [DISTWIDTH] IN BLOOD BY AUTOMATED COUNT: 43.7 FL (ref 35.9–50)
GFR SERPLBLD CREATININE-BSD FMLA CKD-EPI: 64 ML/MIN/1.73 M 2
GLOBULIN SER CALC-MCNC: 2.4 G/DL (ref 1.9–3.5)
GLUCOSE SERPL-MCNC: 93 MG/DL (ref 65–99)
HCT VFR BLD AUTO: 47.9 % (ref 42–52)
HDLC SERPL-MCNC: 48 MG/DL
HGB BLD-MCNC: 16.1 G/DL (ref 14–18)
IMM GRANULOCYTES # BLD AUTO: 0.02 K/UL (ref 0–0.11)
IMM GRANULOCYTES NFR BLD AUTO: 0.3 % (ref 0–0.9)
LDLC SERPL CALC-MCNC: 126 MG/DL
LYMPHOCYTES # BLD AUTO: 1.56 K/UL (ref 1–4.8)
LYMPHOCYTES NFR BLD: 22.7 % (ref 22–41)
MCH RBC QN AUTO: 30.8 PG (ref 27–33)
MCHC RBC AUTO-ENTMCNC: 33.6 G/DL (ref 32.3–36.5)
MCV RBC AUTO: 91.8 FL (ref 81.4–97.8)
MICROALBUMIN UR-MCNC: 7.2 MG/DL
MICROALBUMIN/CREAT UR: 42 MG/G (ref 0–30)
MONOCYTES # BLD AUTO: 0.62 K/UL (ref 0–0.85)
MONOCYTES NFR BLD AUTO: 9 % (ref 0–13.4)
NEUTROPHILS # BLD AUTO: 4.47 K/UL (ref 1.82–7.42)
NEUTROPHILS NFR BLD: 65.1 % (ref 44–72)
NRBC # BLD AUTO: 0 K/UL
NRBC BLD-RTO: 0 /100 WBC (ref 0–0.2)
PLATELET # BLD AUTO: 218 K/UL (ref 164–446)
PMV BLD AUTO: 11.3 FL (ref 9–12.9)
POTASSIUM SERPL-SCNC: 4.8 MMOL/L (ref 3.6–5.5)
PROT SERPL-MCNC: 6.6 G/DL (ref 6–8.2)
RBC # BLD AUTO: 5.22 M/UL (ref 4.7–6.1)
SODIUM SERPL-SCNC: 143 MMOL/L (ref 135–145)
TRIGL SERPL-MCNC: 54 MG/DL (ref 0–149)
WBC # BLD AUTO: 6.9 K/UL (ref 4.8–10.8)

## 2024-05-22 ENCOUNTER — OFFICE VISIT (OUTPATIENT)
Dept: MEDICAL GROUP | Facility: MEDICAL CENTER | Age: 74
End: 2024-05-22
Payer: MEDICARE

## 2024-05-22 VITALS
BODY MASS INDEX: 31.02 KG/M2 | TEMPERATURE: 98.3 F | RESPIRATION RATE: 16 BRPM | WEIGHT: 193 LBS | OXYGEN SATURATION: 96 % | HEART RATE: 89 BPM | HEIGHT: 66 IN | SYSTOLIC BLOOD PRESSURE: 136 MMHG | DIASTOLIC BLOOD PRESSURE: 88 MMHG

## 2024-05-22 DIAGNOSIS — I12.9 HYPERTENSIVE KIDNEY DISEASE WITH CHRONIC KIDNEY DISEASE STAGE II: ICD-10-CM

## 2024-05-22 DIAGNOSIS — T46.6X5A STATIN MYOPATHY: ICD-10-CM

## 2024-05-22 DIAGNOSIS — I70.0 AORTIC ATHEROSCLEROSIS (HCC): ICD-10-CM

## 2024-05-22 DIAGNOSIS — E78.5 HYPERLIPIDEMIA, UNSPECIFIED HYPERLIPIDEMIA TYPE: ICD-10-CM

## 2024-05-22 DIAGNOSIS — R42 VERTIGO: ICD-10-CM

## 2024-05-22 DIAGNOSIS — G72.0 STATIN MYOPATHY: ICD-10-CM

## 2024-05-22 DIAGNOSIS — Z00.00 ENCOUNTER FOR MEDICARE ANNUAL WELLNESS EXAM: ICD-10-CM

## 2024-05-22 DIAGNOSIS — I71.21 ANEURYSM OF ASCENDING AORTA WITHOUT RUPTURE (HCC): ICD-10-CM

## 2024-05-22 DIAGNOSIS — N18.2 HYPERTENSIVE KIDNEY DISEASE WITH CHRONIC KIDNEY DISEASE STAGE II: ICD-10-CM

## 2024-05-22 PROBLEM — D69.2 SENILE PURPURA (HCC): Status: RESOLVED | Noted: 2017-10-10 | Resolved: 2024-05-22

## 2024-05-22 PROCEDURE — G0439 PPPS, SUBSEQ VISIT: HCPCS | Performed by: FAMILY MEDICINE

## 2024-05-22 PROCEDURE — 3079F DIAST BP 80-89 MM HG: CPT | Performed by: FAMILY MEDICINE

## 2024-05-22 PROCEDURE — 3075F SYST BP GE 130 - 139MM HG: CPT | Performed by: FAMILY MEDICINE

## 2024-05-22 ASSESSMENT — PATIENT HEALTH QUESTIONNAIRE - PHQ9: CLINICAL INTERPRETATION OF PHQ2 SCORE: 0

## 2024-05-22 ASSESSMENT — ENCOUNTER SYMPTOMS: GENERAL WELL-BEING: GOOD

## 2024-05-22 ASSESSMENT — ACTIVITIES OF DAILY LIVING (ADL): BATHING_REQUIRES_ASSISTANCE: 0

## 2024-05-22 ASSESSMENT — FIBROSIS 4 INDEX: FIB4 SCORE: 1.56

## 2024-05-22 NOTE — PROGRESS NOTES
Chief Complaint   Patient presents with    Annual Wellness Visit    Lab Results       HPI:  Ramesh Manjarrez is a 74 y.o. here for Medicare Annual Wellness Visit     History of Present Illness  The patient is a 74-year-old male who presents for annual wellness visit.    The patient reports overall well-being, albeit with occasional days of discomfort, which he attributes to insufficient sleep. He maintains a sleep duration of 6 to 8 hours per night, with occasional awakenings at 3:00 AM, but manages to return to sleep. He expresses a preference for not waking up before 8:00 AM, opting instead to distract himself by watching television. His medication regimen is minimal. He has a powder, which he did not use due to pruritus on his scalp and ears, which has since improved. He showers approximately every third day, which he suspects may be contributing to his itching. He enjoys cycling at the club house and is considering using a treadmill. He has been walking more without his cane, although he forgot to bring his cane today. He plans to return to the gym on 08/09/2024 to increase his physical activity. He reports increased urinary frequency during the day, which he attributes to a previous urinary tract infection. He has been increasing his water intake to prevent recurrence. His urine appears darker after urinating three times prior to flushing. His stool is tan to light brown, but dark chocolate consumption exacerbates his stool color. He denies depression, memory concerns, or falls. He maintains a healthy diet, but reports weight gain of 10 pounds since his last visit. He does not have an advance directive. He denies bruising, but reports occasional hand numbness while watching TV, which resolves with movement. His feet are generally intact, with occasional numbness in his toes. His fingernails are not blue. He has eliminated sweets from his diet. He is unable to take ibuprofen due to aspirin content, but  takes Tylenol as needed. He takes CoQ10, ashwagandha, and fish oil. He was previously on Lipitor, but discontinued it due to muscle weakness. He was advised to try red yeast rice. He consumes one egg per week.    The patient experienced a dizzy spell approximately 2 months ago while getting out of bed. This occurs intermittently. He consulted his ear doctor, who recommended sitting straight for a while to see if the dizziness resolves. He performed exercises recommended by his ear doctor, which improved his symptoms.    The patient's total cholesterol has increased from 150 to 185, and his LDL cholesterol has decreased from 159 to 126.    The patient's kidney function has remained stable for the last 10 years.   His uncle had an abdominal aortic aneurysm.        Patient Active Problem List    Diagnosis Date Noted    Statin myopathy 05/22/2024    GERD (gastroesophageal reflux disease) 02/21/2024    Normocytic anemia 10/15/2023    No contraindication to deep vein thrombosis (DVT) prophylaxis 08/23/2023    Ascending aortic aneurysm (HCC) 08/23/2023    Closed fracture of one rib of left side 08/22/2023    Multiple closed pelvic fractures without disruption of pelvic [place] (HCC) 08/22/2023    Chronic compression fracture of L3 vertebra (HCC) 08/22/2023    BPH with elevated PSA 02/15/2023    Tinnitus, bilateral 02/15/2023    PVD (peripheral vascular disease) ([facility]) 04/20/2022    Hyperopic astigmatism of both eyes 12/11/2020    Hypertensive kidney disease with chronic kidney disease stage II 10/20/2020    Aortic atherosclerosis (HCC) 10/20/2020    Optic atrophy 01/13/2020    Fuchs' corneal dystrophy 01/13/2020    Tortuous aorta ([facility]) 02/04/2019    Hyperlipidemia 10/10/2017       Current Outpatient Medications   Medication Sig Dispense Refill    Red Yeast Rice Extract (RED YEAST RICE PO) Take  by mouth.      Omega-3 Fatty Acids (FISH OIL PO) Take  by mouth.      Coenzyme Q10 (COQ-10 PO) Take  by mouth.      artificial tears  (EYE LUBRICANT) Ointment ophth ointment Apply 2 Applications to both eyes 1 time a day as needed (dry eyes). Indications: dry eyes       No current facility-administered medications for this visit.          Current supplements as per medication list.     Allergies: Aspirin and Codeine    Current social contact/activities:   Hot August Nights  Club house for exercise     He  reports that he quit smoking about 35 years ago. His smoking use included cigarettes. He started smoking about 45 years ago. He has a 10 pack-year smoking history. He has never used smokeless tobacco. He reports current alcohol use. He reports that he does not use drugs.  Counseling given: Not Answered      ROS:    Gait: Uses a cane  Ostomy: No  Other tubes: No  Amputations: No  Chronic oxygen use: No  Last eye exam: October 2023  Wears hearing aids: Yes   : Denies any urinary leakage during the last 6 months    Screening:    Depression Screening  Little interest or pleasure in doing things?  0 - not at all  Feeling down, depressed , or hopeless? 0 - not at all  Patient Health Questionnaire Score: 0     If depressive symptoms identified deferred to follow up visit unless specifically addressed in assessment and plan.    Interpretation of PHQ-9 Total Score   Score Severity   1-4 No Depression   5-9 Mild Depression   10-14 Moderate Depression   15-19 Moderately Severe Depression   20-27 Severe Depression    Screening for Cognitive Impairment  Do you or any of your friends or family members have any concern about your memory? No  Three Minute Recall (Leader, Season, Table) 3/3    Adeel clock face with all 12 numbers and set the hands to show 10 minutes after 11.  Yes    Cognitive concerns identified deferred for follow up unless specifically addressed in assessment and plan.    Fall Risk Assessment  Has the patient had two or more falls in the last year or any fall with injury in the last year?  No    Safety Assessment  Do you always wear your  seatbelt?  Yes  Any changes to home needed to function safely? No  Difficulty hearing.  Yes  Patient counseled about all safety risks that were identified.    Functional Assessment ADLs  Are there any barriers preventing you from cooking for yourself or meeting nutritional needs?  No.    Are there any barriers preventing you from driving safely or obtaining transportation?  No.    Are there any barriers preventing you from using a telephone or calling for help?  No    Are there any barriers preventing you from shopping?  No.    Are there any barriers preventing you from taking care of your own finances?  No    Are there any barriers preventing you from managing your medications?  No    Are there any barriers preventing you from showering, bathing or dressing yourself? No    Are there any barriers preventing you from doing housework or laundry? No  Are there any barriers preventing you from using the toilet?No  Are you currently engaging in any exercise or physical activity?  No.      Self-Assessment of Health  What is your perception of your health? Good  Do you sleep more than six hours a night? Yes  In the past 7 days, how much did pain keep you from doing your normal work? Some  Do you spend quality time with family or friends (virtually or in person)? Yes  Do you usually eat a heart healthy diet that constists of a variety of fruits, vegetables, whole grains and fiber? Yes  Do you eat foods high in fat and/or Fast Food more than three times per week? No    Advance Care Planning  Do you have an Advance Directive, Living Will, Durable Power of , or POLST? No                 Health Maintenance Summary            Overdue - Hepatitis C Screening (Once) Never done      No completion history exists for this topic.              Overdue - Colorectal Cancer Screening (View Topic Details) Never done      No completion history exists for this topic.              Overdue - Zoster (Shingles) Vaccines (1 of 2) Never  done      No completion history exists for this topic.              Ordered - Abdominal Aortic Aneurysm (AAA) Screening (Once) Ordered on 5/22/2024      No completion history exists for this topic.              Overdue - COVID-19 Vaccine (1 - 2023-24 season) Never done      No completion history exists for this topic.              Influenza Vaccine (Season Ended) Next due on 9/1/2024 11/25/2008  Imm Admin: Influenza, Unspecified - HISTORICAL DATA              Annual Wellness Visit (Yearly) Next due on 5/22/2025 05/22/2024  Visit Dx: Encounter for Medicare annual wellness exam    09/06/2022      10/18/2021      10/24/2020                IMM DTaP/Tdap/Td Vaccine (2 - Td or Tdap) Next due on 10/24/2030      10/24/2020  Imm Admin: Tdap Vaccine    12/07/2010  Imm Admin: TD Vaccine              Pneumococcal Vaccine: 65+ Years (Series Information) Completed      05/20/2016  Imm Admin: Pneumococcal Conjugate Vaccine (Prevnar/PCV-13)    02/06/2014  Imm Admin: Pneumococcal polysaccharide vaccine (PPSV-23)              Hepatitis A Vaccine (Hep A) (Series Information) Aged Out      No completion history exists for this topic.              Hepatitis B Vaccine (Hep B) (Series Information) Aged Out      No completion history exists for this topic.              HPV Vaccines (Series Information) Aged Out      No completion history exists for this topic.              Polio Vaccine (Inactivated Polio) (Series Information) Aged Out      No completion history exists for this topic.              Meningococcal Immunization (Series Information) Aged Out      No completion history exists for this topic.                    Patient Care Team:  Armaan Koch D.O. as PCP - General (Family Medicine)  Rosanna Eid, PT, DPT as Transitional Care Navigator  [facility] ([employer] [facility])        Social History     Tobacco Use    Smoking status: Former     Current packs/day: 0.00     Average packs/day: 1 pack/day for 10.0 years  "(10.0 ttl pk-yrs)     Types: Cigarettes     Start date:      Quit date:      Years since quittin.4    Smokeless tobacco: Never   Vaping Use    Vaping status: Never Used   Substance Use Topics    Alcohol use: Yes     Comment: 1 drink a month    Drug use: Never     Family History   Problem Relation Age of Onset    Other Mother         Liver disease    Diabetes Father     Heart Attack Father         76     He  has a past medical history of Acute encephalopathy, Acute hypoxic respiratory failure (HCC), Acute kidney injury (HCC), Bilateral atelectasis, BPH with elevated PSA, Enterococcus faecalis UTI (urinary tract infection), Hyperbilirubinemia, Hypokalemia, Hypophosphatemia, Polyneuropathy in other diseases classified elsewhere ([facility]) (2015), Recurrent major depressive disorder, in partial remission ([facility]) (1960), Senile purpura ([facility]) (10/10/2017), Sepsis ([facility]), Tinnitus, bilateral, and Trauma.   Past Surgical History:   Procedure Laterality Date    INGUINAL HERNIA REPAIR Left     PROSTATE NEEDLE BIOPSY      x4 reports all benign       Exam:   /88   Pulse 89   Temp 36.8 °C (98.3 °F) (Temporal)   Resp 16   Ht 1.676 m (5' 6\")   Wt 87.5 kg (193 lb)   SpO2 96%  Body mass index is 31.15 kg/m².    Hearing fair.    Dentition fair  Alert, oriented in no acute distress.  Eye contact is good, speech goal directed, affect calm    Results  Laboratory Studies  Total cholesterol 185, LDL cholesterol 126. Fasting sugars 93. Liver enzymes are all within standard limits. Kidney function stable. Microalbumin in urine 42.       Assessment and Plan. The following treatment and monitoring plan is recommended:    1. Encounter for Medicare annual wellness exam    2. Vertigo    3. Statin myopathy    4. Hyperlipidemia, unspecified hyperlipidemia type  - Red Yeast Rice Extract (RED YEAST RICE PO); Take  by mouth.  - Omega-3 Fatty Acids (FISH OIL PO); Take  by mouth.  - Coenzyme Q10 (COQ-10 PO); Take  by " mouth.    5. Aortic atherosclerosis (HCC)  - CT-CTA COMPLETE THORACOABDOMINAL AORTA; Future  - Basic Metabolic Panel; Future  - Red Yeast Rice Extract (RED YEAST RICE PO); Take  by mouth.  - Omega-3 Fatty Acids (FISH OIL PO); Take  by mouth.  - Coenzyme Q10 (COQ-10 PO); Take  by mouth.    6. Hypertensive kidney disease with chronic kidney disease stage II  - Basic Metabolic Panel; Future    7. Aneurysm of ascending aorta without rupture (HCC)  - CT-CTA COMPLETE THORACOABDOMINAL AORTA; Future  - Basic Metabolic Panel; Future      Assessment & Plan  1. Annual wellness visit.  Appropriate guidance and counseling provided including diet, exercise, supplements, screening exams. The patient was counseled to utilize the treadmill, albeit caution due to its lower speed. He was encouraged to maintain an active lifestyle. Advanced directive paperwork was provided by mail as I forgot to give it to him on the way out.    2. Vertigo.  The vertigo is likely attributed to the patient's inner ear.  He responded well to conservative treatment of moving his head in certain positions as directed by his audiologist.  Will continue to monitor for changes.    3. Statin myopathy.  The use of ezetimibe as an alternative to statins was discussed.  Patient can continue red yeast rice.    4. Mild chronic kidney disease.  The patient's kidney function has remained stable. The patient was advised to maintain adequate hydration and avoid NSAIDs.    5. Abdominal aortic aneurysm.  The aneurysm measures 4.5 cm. A CT scan of the aorta will be scheduled this summer. Non-fasting blood draw will be ordered a week prior to the CT scan to assess kidney function. If the aneurysm enlarges, more frequent monitoring will be necessary, and the patient will be referred to vascular medicine.    Follow-up  The patient is scheduled for a follow-up visit in 3 months.       Services suggested: No services needed at this time  Health Care Screening: Age-appropriate  preventive services recommended by USPTF and ACIP covered by Medicare were discussed today. Services ordered if indicated and agreed upon by the patient.  Referrals offered: Community-based lifestyle interventions to reduce health risks and promote self-management and wellness, fall prevention, nutrition, physical activity, tobacco-use cessation, weight loss, and mental health services as per orders if indicated.    Discussion today about general wellness and lifestyle habits:    Prevent falls and reduce trip hazards; Cautioned about securing or removing rugs.  Have a working fire alarm and carbon monoxide detector;   Engage in regular physical activity and social activities     Follow-up: Return in about 3 months (around 8/22/2024), or if symptoms worsen or fail to improve, for Imaging F/U, Lab F/U.

## 2024-06-02 ENCOUNTER — HOSPITAL ENCOUNTER (EMERGENCY)
Facility: MEDICAL CENTER | Age: 74
End: 2024-06-02
Attending: EMERGENCY MEDICINE
Payer: MEDICARE

## 2024-06-02 ENCOUNTER — APPOINTMENT (OUTPATIENT)
Dept: RADIOLOGY | Facility: MEDICAL CENTER | Age: 74
End: 2024-06-02
Attending: EMERGENCY MEDICINE
Payer: MEDICARE

## 2024-06-02 VITALS
HEART RATE: 60 BPM | OXYGEN SATURATION: 95 % | HEIGHT: 67 IN | DIASTOLIC BLOOD PRESSURE: 76 MMHG | SYSTOLIC BLOOD PRESSURE: 141 MMHG | RESPIRATION RATE: 16 BRPM | WEIGHT: 186 LBS | BODY MASS INDEX: 29.19 KG/M2 | TEMPERATURE: 98.4 F

## 2024-06-02 DIAGNOSIS — S00.31XA ABRASION OF NOSE, INITIAL ENCOUNTER: ICD-10-CM

## 2024-06-02 DIAGNOSIS — S42.291A CLOSED FRACTURE OF HEAD OF RIGHT HUMERUS, INITIAL ENCOUNTER: ICD-10-CM

## 2024-06-02 DIAGNOSIS — S09.90XA CLOSED HEAD INJURY, INITIAL ENCOUNTER: ICD-10-CM

## 2024-06-02 PROCEDURE — 99284 EMERGENCY DEPT VISIT MOD MDM: CPT

## 2024-06-02 PROCEDURE — 73030 X-RAY EXAM OF SHOULDER: CPT | Mod: LT

## 2024-06-02 PROCEDURE — 99283 EMERGENCY DEPT VISIT LOW MDM: CPT | Performed by: STUDENT IN AN ORGANIZED HEALTH CARE EDUCATION/TRAINING PROGRAM

## 2024-06-02 PROCEDURE — A9270 NON-COVERED ITEM OR SERVICE: HCPCS | Performed by: EMERGENCY MEDICINE

## 2024-06-02 PROCEDURE — 73200 CT UPPER EXTREMITY W/O DYE: CPT | Mod: LT

## 2024-06-02 PROCEDURE — 700102 HCHG RX REV CODE 250 W/ 637 OVERRIDE(OP): Performed by: EMERGENCY MEDICINE

## 2024-06-02 PROCEDURE — 70450 CT HEAD/BRAIN W/O DYE: CPT

## 2024-06-02 RX ORDER — HYDROCODONE BITARTRATE AND ACETAMINOPHEN 7.5; 325 MG/1; MG/1
1 TABLET ORAL EVERY 6 HOURS PRN
Qty: 20 TABLET | Refills: 0 | Status: SHIPPED | OUTPATIENT
Start: 2024-06-02 | End: 2024-06-07

## 2024-06-02 RX ORDER — HYDROCODONE BITARTRATE AND ACETAMINOPHEN 5; 325 MG/1; MG/1
1 TABLET ORAL ONCE
Status: COMPLETED | OUTPATIENT
Start: 2024-06-02 | End: 2024-06-02

## 2024-06-02 RX ORDER — HYDROCODONE BITARTRATE AND ACETAMINOPHEN 7.5; 325 MG/1; MG/1
1 TABLET ORAL EVERY 6 HOURS PRN
Qty: 20 TABLET | Refills: 0 | Status: SHIPPED | OUTPATIENT
Start: 2024-06-02 | End: 2024-06-02

## 2024-06-02 RX ADMIN — HYDROCODONE BITARTRATE AND ACETAMINOPHEN 1 TABLET: 5; 325 TABLET ORAL at 15:26

## 2024-06-02 RX ADMIN — HYDROCODONE BITARTRATE AND ACETAMINOPHEN 1 TABLET: 5; 325 TABLET ORAL at 15:12

## 2024-06-02 ASSESSMENT — FIBROSIS 4 INDEX: FIB4 SCORE: 1.56

## 2024-06-02 NOTE — ED PROVIDER NOTES
ED Provider Note    Scribed for Tommie Talley M.D. by Chris Gar. 6/2/2024  2:41 PM    Primary care provider: Armaan Koch D.O.  Means of arrival: T-500 head injury.     CHIEF COMPLAINT  Chief Complaint   Patient presents with    T-5000 Head Injury     Patient tripped on concrete while at the pool, nose laceration, left shoulder pain, TBI alert. -Blood thinners.     Shoulder Pain       EXTERNAL RECORDS REVIEWED  External records reviewed show patient was seen by Dr. Koch for annual check up, patient had dizziness and cholesterol issues. Has ascending aortic aneurysm, hypertension with kidney diease, and hyperlipidemia.  HPI  Ramesh Manjarrez is a 74 y.o. male who was brought in by EMS and presents to the Emergency Department T-500 head injury. Patient was at a pool party and missed a step, tripping and landing on concrete floor that resulted in nasal abrasion and left shoulder pain. Denies loss of consciousness or any headaches at the moment. Denies being on any blood thinner. Denies any neck or back pain.     LIMITATION TO HISTORY   None    OUTSIDE HISTORIAN(S):  EMS was present and contributed to patient tripping and landing on concrete floor that resulted in nose laceration and left shoulder pain.  PAST MEDICAL HISTORY  Past Medical History:   Diagnosis Date    Acute encephalopathy     Acute hypoxic respiratory failure (HCC)     Acute kidney injury (HCC)     Bilateral atelectasis     BPH with elevated PSA     Enterococcus faecalis UTI (urinary tract infection)     Hyperbilirubinemia     Hypokalemia     Hypophosphatemia     Polyneuropathy in other diseases classified elsewhere (HCC) 05/19/2015    Formatting of this note might be different from the original.   *DUE TO B12 DEF   10/24/20 eleuterio note: Neurological:        Comments: Numbness of feet.      Last Assessment & Plan:    Formatting of this note might be different from the original.   Stable    Defers meds    Recurrent major depressive disorder,  in partial remission (HCC) 1960    Formatting of this note might be different from the original.         Last Assessment & Plan:    Formatting of this note might be different from the original.   Defers meds    Senile purpura (Formerly [facility]) 10/10/2017    Formatting of this note might be different from the original.         Last Assessment & Plan:    Formatting of this note might be different from the original.   avoid trauma and blood thinners.    Sepsis (Formerly [facility])     Tinnitus, bilateral     Trauma        FAMILY HISTORY  Family History   Problem Relation Age of Onset    Other Mother         Liver disease    Diabetes Father     Heart Attack Father         77       SOCIAL HISTORY  Social History     Tobacco Use    Smoking status: Former     Current packs/day: 0.00     Average packs/day: 1 pack/day for 10.0 years (10.0 ttl pk-yrs)     Types: Cigarettes     Start date:      Quit date:      Years since quittin.4    Smokeless tobacco: Never   Vaping Use    Vaping status: Never Used   Substance Use Topics    Alcohol use: Yes     Comment: 1 drink a month    Drug use: Never     Social History     Substance and Sexual Activity   Drug Use Never       SURGICAL HISTORY  Past Surgical History:   Procedure Laterality Date    INGUINAL HERNIA REPAIR Left     PROSTATE NEEDLE BIOPSY      x4 reports all benign       CURRENT MEDICATIONS  No current facility-administered medications for this encounter.    Current Outpatient Medications:     HYDROcodone-acetaminophen (NORCO) 7.5-325 MG tab, Take 1 Tablet by mouth every 6 hours as needed for Moderate Pain or Severe Pain for up to 5 days., Disp: 20 Tablet, Rfl: 0    Red Yeast Rice Extract (RED YEAST RICE PO), Take  by mouth., Disp: , Rfl:     Omega-3 Fatty Acids (FISH OIL PO), Take  by mouth., Disp: , Rfl:     Coenzyme Q10 (COQ-10 PO), Take  by mouth., Disp: , Rfl:     artificial tears (EYE LUBRICANT) Ointment ophth ointment, Apply 2 Applications to both eyes 1 time a day as needed  "(dry eyes). Indications: dry eyes, Disp: , Rfl:     ALLERGIES  Allergies   Allergen Reactions    Aspirin Unspecified     Worsening tinnitus  Other reaction(s): Other (See Comments)  TINNITUS      Atorvastatin Myalgia    Codeine      Other reaction(s): Unknown       PHYSICAL EXAM  VITAL SIGNS: BP (!) 163/98   Pulse 74   Temp 36.9 °C (98.4 °F) (Temporal)   Resp 20   Ht 1.702 m (5' 7\")   Wt 84.4 kg (186 lb)   SpO2 96%   BMI 29.13 kg/m²   Reviewed and hypertensive  Constitutional: Well developed, Well nourished.  HENT: Normocephalic, nasal abrasion but no nasal tenderness or septal hematoma.  No facial tenderness.  No scalp hematoma., bilateral external ears normal, No intraoral erythema, edema, exudate  Eyes: PERRLA, conjunctiva pink, no scleral icterus.   Cardiovascular: Regular rate and rhythm. No murmurs, rubs or gallops.  No dependent edema or calf tenderness  Respiratory: Lungs clear to auscultation bilaterally. No wheezes, rales, or rhonchi.  Abdominal:  Abdomen soft, non-tender, non distended. No rebound, or guarding.    Skin: No erythema, no rash. No wounds or bruising.  Genitourinary: No costovertebral angle tenderness.   Musculoskeletal: He has severe tenderness of the left shoulder over the humeral head and will not move it.  There is no clavicle or distal humerus tenderness..   Neurologic: CS 15 alert & oriented x 3, cranial nerves 2-12 intact, grasp, biceps, extensor houses longus and ankle plantarflexion symmetric.  No focal deficit noted.  Psychiatric: Affect normal, Judgment normal, Mood normal.     RADIOLOGY  I have independently interpreted the shoulder x-ray associated with this visit demonstrating comminuted humeral head fracture and possible glenoid fracture.  I am awaiting the final reading from the radiologist.     Final Radiology Report  CT-SHOULDER W/O PLUS RECONS LEFT   Final Result      Severely comminuted and severely displaced humeral head and neck fracture.      DX-SHOULDER 2+ LEFT "   Final Result      Comminuted displaced humeral head and neck fracture with deformity. Question fracture of the glenoid. Recommend further workup with CT of the shoulder for definitive evaluation of fracture anatomy.      CT-HEAD W/O   Final Result      1.  Chronic microvascular ischemic type changes and old right thalamic lacunar infarct.   2.  No acute intracranial abnormality.           Radiologist interpretation have been reviewed by me.     COURSE & MEDICAL DECISION MAKING  2:41 PM - Patient seen and examined at bedside. The patient will be medicated with Norco 5-325 mg. Ordered for CT-shoulder, DX-shoulder left, CT-head to evaluate his symptoms.      3:01 PM- Patient was reevaluated at bedside. X-ray of left shoulder shows comminuted displaced humeral head and neck fracture with deformity. Will contact ortho to see if patient needs further surgery.     3:12 PM - I discussed the patient's case and the above findings with Dr. Swift (Ortho) who states that patient does not need surgery at this time. Will be medicating patient with Norco 5-325 MG for pain.    6:21 PM- Recommended patient to take Tylenol for mild pain and Norco instead of Tylenol for more severe pain.  Recommend patient to call Dr. Castillo for follow-up at [facility]. The patient will be discharged with Norco 7.5-325 MG and should return if symptoms worsen or if new symptoms arise. The patient understands and agrees to plan.        INTERVENTIONS BY ME:  Medications   HYDROcodone-acetaminophen (Norco) 5-325 MG per tablet 1 Tablet (1 Tablet Oral Given 6/2/24 1512)   HYDROcodone-acetaminophen (Norco) 5-325 MG per tablet 1 Tablet (1 Tablet Oral Given 6/2/24 1526)   Was placed in the sling    On reassessment response to intervention good improvement in pain after second hydrocodone    ASSESSMENT, COURSE AND PLAN:  PROBLEMS EVALUATED THIS VISIT:    This patient presents with a head injury after a mechanical fall and despite his age has no  skull fracture or facial fracture or intracranial hemorrhage on CT imaging.  He does have a nasal abrasion.  There is no septal hematoma.  The patient also has a very comminuted left humeral head.  We placed in a sling and is to follow-up with Dr. Castillo.  There is no evidence of dislocation or glenoid fracture.  He is neurovascularly intact.    Measures: I reviewed prescription monitoring program for patient's narcotic use before prescribing a scheduled drug.The patient will not drink alcohol nor drive with prescribed medications      In prescribing controlled substances to this patient, I certify that I have obtained and reviewed the medical history this patient I have also made a good elvira effort to obtain applicable records from other providers who have treated the patient and records did not demonstrate any increased risk of substance abuse that would prevent me from prescribing controlled substances.     I have conducted a physical exam and documented it. I have reviewed Mr. Manjarrez’s prescription history as maintained by the [place] Prescription Monitoring Program.     I have assessed the patient’s risk for abuse, dependency, and addiction using the validated Opioid Risk Tool available at https://www.mdcalc.com/jidbyq-hvhw-mbzg-ort-narcotic-abuse.     Given the above, I believe the benefits of controlled substance therapy outweigh the risks. The reasons for prescribing controlled substances include in my professional opinion, controlled substances are a reasonable choice for this patient. Accordingly, I have discussed the risk and benefits, treatment plan, and alternative therapies with the patient. The patient has been consented for the medication and understands the risks.     Narc score: 60    DISPOSITION AND DISCUSSIONS    I have discussed management of the patient with the following physicians and sources:    Dr. Swift (Ortho).    RISK:  Moderate given need for prescription opiate  analgesics    PLAN:  New Prescriptions    HYDROCODONE-ACETAMINOPHEN (NORCO) 7.5-325 MG TAB    Take 1 Tablet by mouth every 6 hours as needed for Moderate Pain or Severe Pain for up to 5 days.     Shoulder fracture handout  Sling      Followup:  Jamison Castillo M.D.  [address] [place] [id number] [phone]    Schedule an appointment as soon as possible for a visit in 3 days      CONDITION: Stable.     FINAL IMPRESSION  1. Closed fracture of head of right humerus, initial encounter    2. Closed head injury, initial encounter    3. Abrasion of nose, initial encounter       Chris ASCENCIO (Bayronibminh), am scribing for, and in the presence of, Tommie Talley M.D..    Electronically signed by: Chris Gar ([place]), 6/2/2024    Tommie ASCENCIO M.D. personally performed the services described in this documentation, as scribed by Chris Gar in my presence, and it is both accurate and complete.    The note accurately reflects work and decisions made by me.  Tommie Talley M.D.  6/2/2024  8:14 PM

## 2024-06-02 NOTE — CONSULTS
74M fall shoulder pain  X rays reviewed showing displaced left proximal humerus fracture questionable inferior glenoid fracture  CT pending  Recommend  sling and follow up to discuss options       Jamison Castillo MD  Orthopedic Trauma Surgery

## 2024-06-02 NOTE — ED TRIAGE NOTES
".  Chief Complaint   Patient presents with    T-5000 Head Injury     Patient tripped on concrete while at the pool, nose laceration, left shoulder pain, TBI alert. -Blood thinners.     Shoulder Pain         73 yo male brought in by family from home.     TBI Alert called. Patient met ERP at the charge desk. Patient to CT scanner before being roomed.     GCS 15, reports severe left shoulder pain. CMS intact.     BP (!) 163/98   Pulse 74   Resp 20   Ht 1.702 m (5' 7\")   Wt 84.4 kg (186 lb)   SpO2 96%     "

## 2024-06-03 ENCOUNTER — PATIENT OUTREACH (OUTPATIENT)
Dept: SCHEDULING | Facility: IMAGING CENTER | Age: 74
End: 2024-06-03
Payer: MEDICARE

## 2024-06-03 NOTE — ED NOTES
Sling applied by tech, abrasions cleansed, ABT ointment applied.     Patient discharged per order. Oral and written discharge instructions reviewed with patient and his son. Medications sent to home pharmacy. New medications reviewed. All belongings accounted for and taken with patient. Questions answered, and patient agrees with discharge plan. Encouraged to follow up with PCP.     Patient wheeled to lobby. He is being picked up by family.

## 2024-06-03 NOTE — DISCHARGE PLANNING
HTH/SCP TCN chart review completed. Collaborated with CM  The most current review of medical record, knowledge of pt's PLOF and social support, LACE+ score of 67 was considered.  No 6 clicks scores. Per chart review, patient tripped on the concrete at a pool party and sustained a L proximal humeral Fx and questionable inferior glenoid fracture.  Plan for orthopedic outpatient f/u to discuss options. CT pending.      Pt seen at bedside. Introduced TCN program. Provided education regarding post acute levels of care. Education provided regarding case management policy for blanket SNF referrals. Discussed HTH/SCP plan benefits. Pt verbalizes understanding.     Patient states he lives with his son in a 2 level condo with main bed/bath on ground level.  He states he was independent with ADL's, IADL's, mobility and driving at his baseline.  He denies any concerns with food, housing or transportation and states he is not at his baseline level of function.  His son works during the day and states transportation could be an issue.  This TCN discussed free [employer] rides and transportation resources.      TCN will continue to follow and collaborate with discharge planning team as additional post acute needs arise. Thank you.     Completed today:  Choice obtained:  None See above. (Outpatient ortho f/u).  SCP with [facility] PCP. discussed possible outpatient transitional PCP follow up if indicated and pt in agreement; sent information to assist in scheduling

## 2024-06-03 NOTE — DISCHARGE INSTRUCTIONS
Take Tylenol for mild pain and Norco instead of Tylenol for more severe pain.  You may take one half Norco to 2 tablets every 6 hours for pain.  Call Dr. Castillo for follow-up at [facility].  Wear sling at all times.

## 2024-06-05 PROBLEM — S42.293A CLOSED 3-PART FRACTURE OF PROXIMAL HUMERUS: Status: ACTIVE | Noted: 2024-06-05

## 2024-06-06 ENCOUNTER — APPOINTMENT (OUTPATIENT)
Dept: ADMISSIONS | Facility: MEDICAL CENTER | Age: 74
End: 2024-06-06
Attending: STUDENT IN AN ORGANIZED HEALTH CARE EDUCATION/TRAINING PROGRAM
Payer: MEDICARE

## 2024-06-07 ENCOUNTER — PRE-ADMISSION TESTING (OUTPATIENT)
Dept: ADMISSIONS | Facility: MEDICAL CENTER | Age: 74
End: 2024-06-07
Attending: STUDENT IN AN ORGANIZED HEALTH CARE EDUCATION/TRAINING PROGRAM
Payer: MEDICARE

## 2024-06-07 RX ORDER — MULTIVIT WITH MINERALS/LUTEIN
1 TABLET ORAL DAILY
COMMUNITY

## 2024-06-07 RX ORDER — UBIDECARENONE 75 MG
100 CAPSULE ORAL DAILY
COMMUNITY

## 2024-06-07 NOTE — OR NURSING
Patient provided medication instructions per [facility]'s Guideline for Pre-Operative Medication Management. Preparing For Your Procedure packet reviewed with patient, including fasting and bathing guidelines. Patient advised to contact surgeon with any additional questions or concerns.     Patient verbalized understanding of their instructions.  Patient's medication instructions sent to the email address on file.    Fall risk - uses a wheelchair  1 person assist to transfer    BERTIN - no device    Adverse reaction to anesthesia: PONV    Patient unable to come in for preadmit testing, does not drive, does not have a ride. Please do DOS.     Case msg sent to Jud (surgery scheduler) and Dr. Castillo to notify: Patient reports no bowel movement X 5 days, no urination X 3 days (resolved at this time). Patient declines testing appt, does not drive, labs will have to be done DOS, including MRSA. Patient does not have 24 hours of home care at discharge. Patient uses an oatmeal soap for bathing, does not have antibacterial soap. States he will do one shower on Saturday, one on Sunday.

## 2024-06-10 ENCOUNTER — ANESTHESIA (OUTPATIENT)
Dept: SURGERY | Facility: MEDICAL CENTER | Age: 74
End: 2024-06-10
Payer: MEDICARE

## 2024-06-10 ENCOUNTER — APPOINTMENT (OUTPATIENT)
Dept: RADIOLOGY | Facility: MEDICAL CENTER | Age: 74
End: 2024-06-10
Attending: STUDENT IN AN ORGANIZED HEALTH CARE EDUCATION/TRAINING PROGRAM
Payer: MEDICARE

## 2024-06-10 ENCOUNTER — HOSPITAL ENCOUNTER (OUTPATIENT)
Facility: MEDICAL CENTER | Age: 74
End: 2024-06-10
Attending: STUDENT IN AN ORGANIZED HEALTH CARE EDUCATION/TRAINING PROGRAM | Admitting: STUDENT IN AN ORGANIZED HEALTH CARE EDUCATION/TRAINING PROGRAM
Payer: MEDICARE

## 2024-06-10 ENCOUNTER — ANESTHESIA EVENT (OUTPATIENT)
Dept: SURGERY | Facility: MEDICAL CENTER | Age: 74
End: 2024-06-10
Payer: MEDICARE

## 2024-06-10 VITALS
DIASTOLIC BLOOD PRESSURE: 78 MMHG | SYSTOLIC BLOOD PRESSURE: 137 MMHG | TEMPERATURE: 97.6 F | BODY MASS INDEX: 30.44 KG/M2 | HEIGHT: 66 IN | WEIGHT: 189.38 LBS | OXYGEN SATURATION: 96 % | RESPIRATION RATE: 20 BRPM | HEART RATE: 92 BPM

## 2024-06-10 DIAGNOSIS — S42.292A CLOSED 3-PART FRACTURE OF PROXIMAL END OF LEFT HUMERUS, INITIAL ENCOUNTER: ICD-10-CM

## 2024-06-10 LAB — EKG IMPRESSION: NORMAL

## 2024-06-10 PROCEDURE — 160035 HCHG PACU - 1ST 60 MINS PHASE I: Performed by: STUDENT IN AN ORGANIZED HEALTH CARE EDUCATION/TRAINING PROGRAM

## 2024-06-10 PROCEDURE — C1776 JOINT DEVICE (IMPLANTABLE): HCPCS | Performed by: STUDENT IN AN ORGANIZED HEALTH CARE EDUCATION/TRAINING PROGRAM

## 2024-06-10 PROCEDURE — 700105 HCHG RX REV CODE 258: Performed by: STUDENT IN AN ORGANIZED HEALTH CARE EDUCATION/TRAINING PROGRAM

## 2024-06-10 PROCEDURE — 502000 HCHG MISC OR IMPLANTS RC 0278: Performed by: STUDENT IN AN ORGANIZED HEALTH CARE EDUCATION/TRAINING PROGRAM

## 2024-06-10 PROCEDURE — 700111 HCHG RX REV CODE 636 W/ 250 OVERRIDE (IP): Performed by: ANESTHESIOLOGY

## 2024-06-10 PROCEDURE — 160041 HCHG SURGERY MINUTES - EA ADDL 1 MIN LEVEL 4: Performed by: STUDENT IN AN ORGANIZED HEALTH CARE EDUCATION/TRAINING PROGRAM

## 2024-06-10 PROCEDURE — 160046 HCHG PACU - 1ST 60 MINS PHASE II: Performed by: STUDENT IN AN ORGANIZED HEALTH CARE EDUCATION/TRAINING PROGRAM

## 2024-06-10 PROCEDURE — 700101 HCHG RX REV CODE 250: Performed by: ANESTHESIOLOGY

## 2024-06-10 PROCEDURE — 160047 HCHG PACU  - EA ADDL 30 MINS PHASE II: Performed by: STUDENT IN AN ORGANIZED HEALTH CARE EDUCATION/TRAINING PROGRAM

## 2024-06-10 PROCEDURE — 73030 X-RAY EXAM OF SHOULDER: CPT | Mod: LT

## 2024-06-10 PROCEDURE — 502240 HCHG MISC OR SUPPLY RC 0272: Performed by: STUDENT IN AN ORGANIZED HEALTH CARE EDUCATION/TRAINING PROGRAM

## 2024-06-10 PROCEDURE — 700111 HCHG RX REV CODE 636 W/ 250 OVERRIDE (IP): Mod: JZ | Performed by: STUDENT IN AN ORGANIZED HEALTH CARE EDUCATION/TRAINING PROGRAM

## 2024-06-10 PROCEDURE — 160025 RECOVERY II MINUTES (STATS): Performed by: STUDENT IN AN ORGANIZED HEALTH CARE EDUCATION/TRAINING PROGRAM

## 2024-06-10 PROCEDURE — 700111 HCHG RX REV CODE 636 W/ 250 OVERRIDE (IP)

## 2024-06-10 PROCEDURE — 160002 HCHG RECOVERY MINUTES (STAT): Performed by: STUDENT IN AN ORGANIZED HEALTH CARE EDUCATION/TRAINING PROGRAM

## 2024-06-10 PROCEDURE — 160048 HCHG OR STATISTICAL LEVEL 1-5: Performed by: STUDENT IN AN ORGANIZED HEALTH CARE EDUCATION/TRAINING PROGRAM

## 2024-06-10 PROCEDURE — C1713 ANCHOR/SCREW BN/BN,TIS/BN: HCPCS | Performed by: STUDENT IN AN ORGANIZED HEALTH CARE EDUCATION/TRAINING PROGRAM

## 2024-06-10 PROCEDURE — 160029 HCHG SURGERY MINUTES - 1ST 30 MINS LEVEL 4: Performed by: STUDENT IN AN ORGANIZED HEALTH CARE EDUCATION/TRAINING PROGRAM

## 2024-06-10 PROCEDURE — 160036 HCHG PACU - EA ADDL 30 MINS PHASE I: Performed by: STUDENT IN AN ORGANIZED HEALTH CARE EDUCATION/TRAINING PROGRAM

## 2024-06-10 PROCEDURE — 64415 NJX AA&/STRD BRCH PLXS IMG: CPT | Performed by: STUDENT IN AN ORGANIZED HEALTH CARE EDUCATION/TRAINING PROGRAM

## 2024-06-10 PROCEDURE — 160009 HCHG ANES TIME/MIN: Performed by: STUDENT IN AN ORGANIZED HEALTH CARE EDUCATION/TRAINING PROGRAM

## 2024-06-10 PROCEDURE — 93010 ELECTROCARDIOGRAM REPORT: CPT | Performed by: INTERNAL MEDICINE

## 2024-06-10 PROCEDURE — 93005 ELECTROCARDIOGRAM TRACING: CPT | Performed by: STUDENT IN AN ORGANIZED HEALTH CARE EDUCATION/TRAINING PROGRAM

## 2024-06-10 DEVICE — IMPLANTABLE DEVICE: Type: IMPLANTABLE DEVICE | Status: FUNCTIONAL

## 2024-06-10 RX ORDER — MIDAZOLAM HYDROCHLORIDE 1 MG/ML
INJECTION INTRAMUSCULAR; INTRAVENOUS PRN
Status: DISCONTINUED | OUTPATIENT
Start: 2024-06-10 | End: 2024-06-10 | Stop reason: SURG

## 2024-06-10 RX ORDER — DEXAMETHASONE SODIUM PHOSPHATE 4 MG/ML
INJECTION, SOLUTION INTRA-ARTICULAR; INTRALESIONAL; INTRAMUSCULAR; INTRAVENOUS; SOFT TISSUE
Status: COMPLETED | OUTPATIENT
Start: 2024-06-10 | End: 2024-06-10

## 2024-06-10 RX ORDER — CEFAZOLIN SODIUM 1 G/3ML
INJECTION, POWDER, FOR SOLUTION INTRAMUSCULAR; INTRAVENOUS PRN
Status: DISCONTINUED | OUTPATIENT
Start: 2024-06-10 | End: 2024-06-10 | Stop reason: SURG

## 2024-06-10 RX ORDER — KETOROLAC TROMETHAMINE 15 MG/ML
INJECTION, SOLUTION INTRAMUSCULAR; INTRAVENOUS PRN
Status: DISCONTINUED | OUTPATIENT
Start: 2024-06-10 | End: 2024-06-10 | Stop reason: SURG

## 2024-06-10 RX ORDER — OXYCODONE HCL 5 MG/5 ML
10 SOLUTION, ORAL ORAL
Status: DISCONTINUED | OUTPATIENT
Start: 2024-06-10 | End: 2024-06-10 | Stop reason: HOSPADM

## 2024-06-10 RX ORDER — VANCOMYCIN HYDROCHLORIDE 1 G/20ML
INJECTION, POWDER, LYOPHILIZED, FOR SOLUTION INTRAVENOUS
Status: COMPLETED | OUTPATIENT
Start: 2024-06-10 | End: 2024-06-10

## 2024-06-10 RX ORDER — SODIUM CHLORIDE, SODIUM LACTATE, POTASSIUM CHLORIDE, CALCIUM CHLORIDE 600; 310; 30; 20 MG/100ML; MG/100ML; MG/100ML; MG/100ML
INJECTION, SOLUTION INTRAVENOUS CONTINUOUS
Status: DISCONTINUED | OUTPATIENT
Start: 2024-06-10 | End: 2024-06-10 | Stop reason: HOSPADM

## 2024-06-10 RX ORDER — TOBRAMYCIN 1.2 G/30ML
INJECTION, POWDER, LYOPHILIZED, FOR SOLUTION INTRAVENOUS
Status: DISCONTINUED | OUTPATIENT
Start: 2024-06-10 | End: 2024-06-10 | Stop reason: HOSPADM

## 2024-06-10 RX ORDER — ONDANSETRON 2 MG/ML
INJECTION INTRAMUSCULAR; INTRAVENOUS PRN
Status: DISCONTINUED | OUTPATIENT
Start: 2024-06-10 | End: 2024-06-10 | Stop reason: SURG

## 2024-06-10 RX ORDER — ONDANSETRON 2 MG/ML
4 INJECTION INTRAMUSCULAR; INTRAVENOUS
Status: DISCONTINUED | OUTPATIENT
Start: 2024-06-10 | End: 2024-06-10 | Stop reason: HOSPADM

## 2024-06-10 RX ORDER — TRANEXAMIC ACID 100 MG/ML
INJECTION, SOLUTION INTRAVENOUS PRN
Status: DISCONTINUED | OUTPATIENT
Start: 2024-06-10 | End: 2024-06-10 | Stop reason: SURG

## 2024-06-10 RX ORDER — LIDOCAINE HYDROCHLORIDE 40 MG/ML
SOLUTION TOPICAL PRN
Status: DISCONTINUED | OUTPATIENT
Start: 2024-06-10 | End: 2024-06-10 | Stop reason: SURG

## 2024-06-10 RX ORDER — HYDROMORPHONE HYDROCHLORIDE 1 MG/ML
0.2 INJECTION, SOLUTION INTRAMUSCULAR; INTRAVENOUS; SUBCUTANEOUS
Status: DISCONTINUED | OUTPATIENT
Start: 2024-06-10 | End: 2024-06-10 | Stop reason: HOSPADM

## 2024-06-10 RX ORDER — DEXAMETHASONE SODIUM PHOSPHATE 4 MG/ML
INJECTION, SOLUTION INTRA-ARTICULAR; INTRALESIONAL; INTRAMUSCULAR; INTRAVENOUS; SOFT TISSUE PRN
Status: DISCONTINUED | OUTPATIENT
Start: 2024-06-10 | End: 2024-06-10 | Stop reason: SURG

## 2024-06-10 RX ORDER — HYDROMORPHONE HYDROCHLORIDE 1 MG/ML
0.4 INJECTION, SOLUTION INTRAMUSCULAR; INTRAVENOUS; SUBCUTANEOUS
Status: DISCONTINUED | OUTPATIENT
Start: 2024-06-10 | End: 2024-06-10 | Stop reason: HOSPADM

## 2024-06-10 RX ORDER — DOCUSATE SODIUM 100 MG/1
100 CAPSULE, LIQUID FILLED ORAL 2 TIMES DAILY
Qty: 60 CAPSULE | Refills: 2 | Status: SHIPPED | OUTPATIENT
Start: 2024-06-10

## 2024-06-10 RX ORDER — HYDRALAZINE HYDROCHLORIDE 20 MG/ML
5 INJECTION INTRAMUSCULAR; INTRAVENOUS
Status: DISCONTINUED | OUTPATIENT
Start: 2024-06-10 | End: 2024-06-10 | Stop reason: HOSPADM

## 2024-06-10 RX ORDER — CEFAZOLIN SODIUM 1 G/3ML
2 INJECTION, POWDER, FOR SOLUTION INTRAMUSCULAR; INTRAVENOUS ONCE
Status: DISCONTINUED | OUTPATIENT
Start: 2024-06-10 | End: 2024-06-10 | Stop reason: HOSPADM

## 2024-06-10 RX ORDER — DIPHENHYDRAMINE HYDROCHLORIDE 50 MG/ML
12.5 INJECTION INTRAMUSCULAR; INTRAVENOUS
Status: DISCONTINUED | OUTPATIENT
Start: 2024-06-10 | End: 2024-06-10 | Stop reason: HOSPADM

## 2024-06-10 RX ORDER — MEPERIDINE HYDROCHLORIDE 25 MG/ML
INJECTION INTRAMUSCULAR; INTRAVENOUS; SUBCUTANEOUS
Status: COMPLETED
Start: 2024-06-10 | End: 2024-06-10

## 2024-06-10 RX ORDER — EPHEDRINE SULFATE 50 MG/ML
5 INJECTION, SOLUTION INTRAVENOUS
Status: DISCONTINUED | OUTPATIENT
Start: 2024-06-10 | End: 2024-06-10 | Stop reason: HOSPADM

## 2024-06-10 RX ORDER — LABETALOL HYDROCHLORIDE 5 MG/ML
5 INJECTION, SOLUTION INTRAVENOUS
Status: DISCONTINUED | OUTPATIENT
Start: 2024-06-10 | End: 2024-06-10 | Stop reason: HOSPADM

## 2024-06-10 RX ORDER — HYDROMORPHONE HYDROCHLORIDE 2 MG/ML
INJECTION, SOLUTION INTRAMUSCULAR; INTRAVENOUS; SUBCUTANEOUS PRN
Status: DISCONTINUED | OUTPATIENT
Start: 2024-06-10 | End: 2024-06-10 | Stop reason: SURG

## 2024-06-10 RX ORDER — OXYCODONE HCL 5 MG/5 ML
5 SOLUTION, ORAL ORAL
Status: DISCONTINUED | OUTPATIENT
Start: 2024-06-10 | End: 2024-06-10 | Stop reason: HOSPADM

## 2024-06-10 RX ORDER — HALOPERIDOL 5 MG/ML
1 INJECTION INTRAMUSCULAR
Status: DISCONTINUED | OUTPATIENT
Start: 2024-06-10 | End: 2024-06-10 | Stop reason: HOSPADM

## 2024-06-10 RX ORDER — LIDOCAINE HYDROCHLORIDE 20 MG/ML
INJECTION, SOLUTION EPIDURAL; INFILTRATION; INTRACAUDAL; PERINEURAL PRN
Status: DISCONTINUED | OUTPATIENT
Start: 2024-06-10 | End: 2024-06-10 | Stop reason: SURG

## 2024-06-10 RX ORDER — HYDROMORPHONE HYDROCHLORIDE 1 MG/ML
0.1 INJECTION, SOLUTION INTRAMUSCULAR; INTRAVENOUS; SUBCUTANEOUS
Status: DISCONTINUED | OUTPATIENT
Start: 2024-06-10 | End: 2024-06-10 | Stop reason: HOSPADM

## 2024-06-10 RX ORDER — OXYCODONE HYDROCHLORIDE 5 MG/1
5 TABLET ORAL EVERY 4 HOURS PRN
Qty: 30 TABLET | Refills: 0 | Status: SHIPPED | OUTPATIENT
Start: 2024-06-10 | End: 2024-06-15

## 2024-06-10 RX ORDER — BUPIVACAINE HYDROCHLORIDE 5 MG/ML
INJECTION, SOLUTION EPIDURAL; INTRACAUDAL
Status: COMPLETED | OUTPATIENT
Start: 2024-06-10 | End: 2024-06-10

## 2024-06-10 RX ORDER — MEPERIDINE HYDROCHLORIDE 25 MG/ML
12.5 INJECTION INTRAMUSCULAR; INTRAVENOUS; SUBCUTANEOUS ONCE
Status: COMPLETED | OUTPATIENT
Start: 2024-06-10 | End: 2024-06-10

## 2024-06-10 RX ORDER — SODIUM CHLORIDE, SODIUM LACTATE, POTASSIUM CHLORIDE, CALCIUM CHLORIDE 600; 310; 30; 20 MG/100ML; MG/100ML; MG/100ML; MG/100ML
INJECTION, SOLUTION INTRAVENOUS CONTINUOUS
Status: ACTIVE | OUTPATIENT
Start: 2024-06-10 | End: 2024-06-10

## 2024-06-10 RX ADMIN — SODIUM CHLORIDE, POTASSIUM CHLORIDE, SODIUM LACTATE AND CALCIUM CHLORIDE: 600; 310; 30; 20 INJECTION, SOLUTION INTRAVENOUS at 08:26

## 2024-06-10 RX ADMIN — LIDOCAINE HYDROCHLORIDE 100 MG: 20 INJECTION, SOLUTION EPIDURAL; INFILTRATION; INTRACAUDAL at 09:44

## 2024-06-10 RX ADMIN — DEXAMETHASONE SODIUM PHOSPHATE 4 MG: 4 INJECTION INTRA-ARTICULAR; INTRALESIONAL; INTRAMUSCULAR; INTRAVENOUS; SOFT TISSUE at 09:28

## 2024-06-10 RX ADMIN — TRANEXAMIC ACID 1000 MG: 100 INJECTION, SOLUTION INTRAVENOUS at 09:50

## 2024-06-10 RX ADMIN — PROPOFOL 200 MG: 10 INJECTION, EMULSION INTRAVENOUS at 09:44

## 2024-06-10 RX ADMIN — LIDOCAINE HYDROCHLORIDE 4 ML: 40 SOLUTION TOPICAL at 09:45

## 2024-06-10 RX ADMIN — SUGAMMADEX 200 MG: 100 INJECTION, SOLUTION INTRAVENOUS at 11:02

## 2024-06-10 RX ADMIN — KETOROLAC TROMETHAMINE 15 MG: 15 INJECTION, SOLUTION INTRAMUSCULAR; INTRAVENOUS at 11:00

## 2024-06-10 RX ADMIN — DEXAMETHASONE SODIUM PHOSPHATE 4 MG: 4 INJECTION, SOLUTION INTRA-ARTICULAR; INTRALESIONAL; INTRAMUSCULAR; INTRAVENOUS; SOFT TISSUE at 09:56

## 2024-06-10 RX ADMIN — CEFAZOLIN 2 G: 1 INJECTION, POWDER, FOR SOLUTION INTRAMUSCULAR; INTRAVENOUS at 09:50

## 2024-06-10 RX ADMIN — HYDROMORPHONE HYDROCHLORIDE 0.5 MG: 2 INJECTION INTRAMUSCULAR; INTRAVENOUS; SUBCUTANEOUS at 10:06

## 2024-06-10 RX ADMIN — MIDAZOLAM HYDROCHLORIDE 1 MG: 2 INJECTION, SOLUTION INTRAMUSCULAR; INTRAVENOUS at 09:27

## 2024-06-10 RX ADMIN — Medication 25 MG: at 10:40

## 2024-06-10 RX ADMIN — HYDROMORPHONE HYDROCHLORIDE 0.25 MG: 2 INJECTION INTRAMUSCULAR; INTRAVENOUS; SUBCUTANEOUS at 11:13

## 2024-06-10 RX ADMIN — MEPERIDINE HYDROCHLORIDE 12.5 MG: 25 INJECTION INTRAMUSCULAR; INTRAVENOUS; SUBCUTANEOUS at 12:06

## 2024-06-10 RX ADMIN — ROCURONIUM BROMIDE 50 MG: 10 INJECTION, SOLUTION INTRAVENOUS at 09:44

## 2024-06-10 RX ADMIN — BUPIVACAINE HYDROCHLORIDE 20 ML: 5 INJECTION, SOLUTION EPIDURAL; INTRACAUDAL at 09:28

## 2024-06-10 RX ADMIN — HYDROMORPHONE HYDROCHLORIDE 0.5 MG: 2 INJECTION INTRAMUSCULAR; INTRAVENOUS; SUBCUTANEOUS at 10:20

## 2024-06-10 RX ADMIN — ONDANSETRON 4 MG: 2 INJECTION INTRAMUSCULAR; INTRAVENOUS at 11:00

## 2024-06-10 RX ADMIN — FENTANYL CITRATE 50 MCG: 50 INJECTION, SOLUTION INTRAMUSCULAR; INTRAVENOUS at 10:02

## 2024-06-10 RX ADMIN — FENTANYL CITRATE 50 MCG: 50 INJECTION, SOLUTION INTRAMUSCULAR; INTRAVENOUS at 09:27

## 2024-06-10 ASSESSMENT — PAIN DESCRIPTION - PAIN TYPE
TYPE: SURGICAL PAIN
TYPE: SURGICAL PAIN;ACUTE PAIN
TYPE: SURGICAL PAIN

## 2024-06-10 ASSESSMENT — FIBROSIS 4 INDEX: FIB4 SCORE: 1.56

## 2024-06-10 NOTE — ANESTHESIA PROCEDURE NOTES
Peripheral Block    Date/Time: 6/10/2024 9:28 AM    Performed by: Migue Quezada M.D.  Authorized by: Migue Quezada M.D.    Patient Location:  Pre-op  Start Time:  6/10/2024 9:28 AM  End Time:  6/10/2024 9:31 AM  Reason for Block: at surgeon's request and post-op pain management ONLY    patient identified, IV checked, site marked, risks and benefits discussed, surgical consent, monitors and equipment checked, pre-op evaluation and timeout performed    Patient Position:  Supine  Prep: ChloraPrep    Monitoring:  Heart rate, continuous pulse ox and cardiac monitor  Block Region:  Upper Extremity  Upper Extremity - Block Type:  BRACHIAL PLEXUS block, Interscalene approach    Laterality:  Left  Procedures: ultrasound guided  Image captured, interpreted and electronically stored.  Local Infiltration:  Lidocaine  Strength:  1 %  Dose:  3 ml  Block Type:  Single-shot  Needle Length:  100mm  Needle Gauge:  21 G  Needle Localization:  Ultrasound guidance  Ultrasound picture in chart  Injection Assessment:  Negative aspiration for heme, no paresthesia on injection, incremental injection and local visualized surrounding nerve on ultrasound  Evidence of intravascular injection: No     US Guided Interscalene Brachial Plexus Block   US transducer placed on the neck in oblique plane approximately at the level of C6.  Anterior and Middle Scalene (MSM) muscles identified with nerve trunks identified between the muscles.  Needle inserted lateral to probe and advanced under direct visualization through the MSM into a perineural position.  After negative aspiration, LA injected with ease and visualized surrounding the nerve trunks.

## 2024-06-10 NOTE — OR NURSING
1344 Arrived from PACU AOX4, Pt's VSS; denies N/V; states pain is at tolerable level. Dressing CDI to left shoulder with sling intact.     1413 D/c orders completed with family/friend ***.  Discharge reviewed, Pt and family/friend verbalized understanding and questions answered.

## 2024-06-10 NOTE — LETTER
June 5, 2024    Patient Name: Ramesh Manjarrez  Surgeon Name: Jamison Castillo M.D.  Surgery Facility: [facility] ([address] [place] [address])  Surgery Date: 6/10/2024    The time of your surgery is not final and may change up to and until the day of your surgery. You will be contacted 24-48 hours prior to your surgery date with your check-in and surgery time.    If you will not be at one of the below numbers please call the surgery scheduler at [phone]  Preferred Phone: [phone]    BEFORE YOUR SURGERY   Pre Registration and/or Lab Work must be done within and no earlier than 28 days prior to your surgery date. Your scheduled facility will contact you regarding all required preregistration and/or lab work. If you have not been contacted within 7 days of your scheduled procedure please call [facility] at [phone] for an appointment as soon as possible.    DAY OF YOUR SURGERY  Nothing to eat or drink after midnight     Refrain from smoking any substance after midnight prior to surgery. Smoking may interfere with the anesthetic and frequently produces nausea during the recovery period.    Continue taking all lifesaving medications. Including the morning of your surgery with small sip of water.    Please do NOT take on the day of surgery:  Diuretics: examples- furosemide (Lasix), spironolactone, hydrochlorothiazide  ACE-inhibitors: examples- lisinopril, ramipril, enalapril  “ARBs”: examples- losartan, Olmesartan, valsartan    Please arrive at the hospital/surgery center at the check-in time provided.     An adult will need to bring you and take you home after your surgery.     AFTER YOUR SURGERY  Post op Appointment:   Date: 6.25.24   Time: 9:00 AM    With: Nena Duong PA-C   Location: [address] [place] [id number]    - Post Surgery - You will need someone to drive you home  - Post Surgery - You will need someone to stay with you for 24 hours     TIME OFF  WORK  FMLA or Disability forms can be faxed directly to: [phone] or you may drop them off at [address] [place] [address]. Our office charges a $35.00 fee per form. Forms will be completed within 10 business days of drop off and payment received. For the status of your forms you may contact our disability office directly [phone].    MEDICATION INSTRUCTIONS **Please read section completely**  The following medications should be stopped a minimum of 10 days prior to surgery:  All over the counter, Supplements & Herbal medications    Anorectics: Phentermine (Adipex-P, Lomaira and Suprenza), Phentermine-topiramate (Qsymia), Bupropion-naltrexone (Contrave)    **If you are on Bupropion for anxiety/depression, please continue this medication up until the day of surgery.     Opiod Partial Agonists/Opioid Antagonists: Buprenorphine (Suboxone, Belbuca, Butrans, Probuphine Implant, Sublocade), Naltrexone (ReVia, Vivitrol), Naloxone    Amphetamines: Dextroamphetamine/Amphetamine (Adderall, Mydayis), Methylphenidate Hydrochloride (Concerta, Metadate, Methylin, Ritalin)    The following medications should be stopped 5 days prior to surgery:  Blood Thinners: Any Aspirin, Aspirin products, anti-inflammatories such as ibuprofen and any blood thinners such as Coumadin and Plavix. Please consult your prescribing physician if you are on life saving blood thinners, in regards to when to stop medications prior to surgery.     The following medications should be stopped a minimum of 3 days prior to surgery:  PDE-5 inhibitors: Sildenafil (Viagra), Tadalafil (Cialis), Vardenafil (Levitra), Avanafil (Stendra)    MAO Inhibitors: Rasagiline (Azilect), Selegiline (Eldepryl, Emsam, Selapar), Isocarboxazid (Marplan), Phenelzine (Nardil)

## 2024-06-10 NOTE — PROGRESS NOTES
Attention order for abduction pillow immobilizer sling. Confirmed that patient has already been fitted for one thru the [employer] OR supply.

## 2024-06-10 NOTE — DISCHARGE INSTRUCTIONS
HOME CARE INSTRUCTIONS    ACTIVITY: Rest and take it easy for the first 24 hours.  A responsible adult is recommended to remain with you during that time.  It is normal to feel sleepy.  We encourage you to not do anything that requires balance, judgment or coordination.    FOR 24 HOURS DO NOT:  Drive, operate machinery or run household appliances.  Drink beer or alcoholic beverages.  Make important decisions or sign legal documents.    SPECIAL INSTRUCTIONS:   Keep your sling on at all times  Your dressings may come off 3 days after surgery, okay to shower at that time maintaining the proper position of your arm  Take your pain medication as needed for the first couple days and switch to Tylenol and ibuprofen  Call to schedule follow-up appointment 2 weeks after surgery for suture/staple removal    Peripheral Nerve Block Discharge Instructions from Same Day Surgery and Inpatient :    Precautions  The numbness may affect your balance  Be careful when walking or moving around  Your leg may be weak: be very careful putting weight on it  If your surgeon did not specify a time, you should not bear weight for 24 hours  Be sure to ask for help when you need it  It is better to have help than to fall and hurt yourself  What to Expect - Upper Extremity  You may experience numbness and weakness in shoulder  on the same side as your surgery  This is normal. For some people, this may be an unpleasant sensation. Be very careful with your numb limb  Ask for help when you need it  Shoulder Surgery Side Effects  In addition to numbness and weakness you may experience other symptoms  Other nerves that are close to those nerves injected can also be affected by local anesthesia  You may experience a hoarseness in your voice  Your breathing may feel different  You may also notice drooping of your eyelid, pupil constriction, and decreased sweating, on the side of your surgery  All of these side effects are normal and will resolve when  "the local anesthetic wears off   Prevent Injury  Protect the limb like a baby  Beware of exposing your limb to extreme heat or cold or trauma  The limb may be injured without you noticing because it is numb  Keep the limb elevated whenever possible  Do not sleep on the limb  Change the position of the limb regularly  Avoid putting pressure on your surgical limb  Pain Control  The initial block on the day of surgery will make your extremity feel \"numb\"  Any consecutive injection including prior to discharge from the hospital will make your extremity feel \"numb\"  You may feel an aching or burning when the local anesthesia starts to wear off  Take pain pills as prescribed by your surgeon  Call your surgeon or anesthesiologist if you do not have adequate pain control    Shoulder Replacement, Care After  This sheet gives you information about how to care for yourself after your procedure. Your health care provider may also give you more specific instructions. If you have problems or questions, contact your health care provider.  What can I expect after the procedure?  After the procedure, it is common to have:  A bruised and stiff shoulder and arm.  Some shoulder and arm pain.  Follow these instructions at home:  Medicines  Take over-the-counter and prescription medicines only as told by your health care provider.  If you were prescribed an antibiotic medicine, use it as told by your health care provider. Do not stop using the antibiotic even if you start to feel better.  Ask your health care provider if the medicine prescribed to you can cause constipation. You may need to take these actions to prevent or treat constipation:  Drink enough fluid to keep your urine pale yellow.  Take over-the-counter or prescription medicines.  Eat foods that are high in fiber, such as beans, whole grains, and fresh fruits and vegetables.  Limit foods that are high in fat and processed sugars, such as fried or sweet foods.  If you have a " sling or immobilizer:  Wear the sling or immobilizer as told by your health care provider. Remove it only as told by your health care provider.  Loosen the sling or immobilizer if your fingers tingle, become numb, or turn cold and blue.  Keep the sling or immobilizer clean and dry.  Bathing  Do not take baths, swim, or use a hot tub until your health care provider approves. Ask your health care provider if you can take showers. You may only be allowed to take sponge baths.  If your sling or immobilizer is not waterproof:  Do not let it get wet.  Cover it with a watertight covering when you take a bath or a shower.  Keep the bandage (dressing) dry until your health care provider says it can be removed.  Incision care  Follow instructions from your health care provider about how to take care of your incision. Make sure you:  Wash your hands with soap and water for at least 20 seconds before and after you change your dressing. If soap and water are not available, use hand .  Change your dressing as told by your health care provider.  Leave staples, stitches (sutures), skin glue, or adhesive strips in place. These skin closures may need to stay in place for 2 weeks or longer. If adhesive strip edges start to loosen and curl up, you may trim the loose edges. Do not remove adhesive strips completely unless your health care provider tells you to do that.  If you have a tube to remove drainage, follow instructions from your health care provider about caring for it. Do not remove the drain tube or any dressings around the tube opening unless your health care provider approves.  Check your incision area every day for signs of infection. Check for:  More redness, swelling, or pain.  More fluid or blood.  Warmth.  Pus or a bad smell.  Managing pain, stiffness, and swelling  If directed, put ice on the affected area. To do this:  If you have a removable sling, remove it as told by your health care provider.  Put ice in  a plastic bag.  Place a towel between your skin and the bag.  Leave the ice on for 20 minutes, 2-3 times a day.  Remove the ice if your skin turns bright red. This is very important. If you cannot feel pain, heat, or cold, you have a greater risk of damage to the area.  If you have an icing device, use it as directed by your health care provider.  Move your fingers and elbow often to reduce stiffness and swelling.  Activity  Do not use your arm to push yourself up in bed or from a chair.  Follow lifting restrictions as told:  Do not lift anything that is heavier than a cup of coffee for the first 6 weeks after surgery, or as told by your health care provider.  Do not lift anything that is heavier than 10 lb (4.5 kg), or the limit that you are told, for 6 months or until your health care provider says that it is safe.  Do exercises, including physical therapy, as told by your health care provider.  Try not to overuse your shoulder. This includes repetitive pushing or pulling. Early overuse of the shoulder may result in later problems. (Overusing the shoulder is easy to do when your pain goes away for the first time.)  Avoid overstretching your arm for 6 weeks after surgery, or as told by your health care provider.  Avoid sitting for a long time without moving. Get up to take short walks every 1-2 hours. This is important to improve blood flow and breathing. Ask for help if you feel weak or unsteady.  Ask for help with some activities. Your health care provider may be able to suggest a clinic or agency for this if you do not have home support.  Do not participate in contact sports.  Driving  Ask your health care provider if the medicine prescribed to you requires you to avoid driving or using machinery.  Do not drive for 2-4 weeks after surgery or as told by your health care provider.  General instructions  Tell your health care provider if you plan to have dental work. Also:  Tell your dentist about your joint  replacement.  Ask your health care provider if there are any special instructions you need to follow before having dental care and routine cleanings.  Do not use any products that contain nicotine or tobacco, such as cigarettes, e-cigarettes, and chewing tobacco. These can delay healing. If you need help quitting, ask your health care provider.  Keep all follow-up visits. This is important.  Contact a health care provider if:  You develop a rash.  You have a fever.  You have any of these signs of infection in your incision area:  More redness, swelling, or pain.  More fluid or blood.  Warmth.  Pus or a bad smell.  Get help right away if:  The edges of the incision site break open after sutures have been removed.  You have redness, swelling, pain, or warmth in your leg or arm.  You have chest pain or shortness of breath.  These symptoms may represent a serious problem that is an emergency. Do not wait to see if the symptoms will go away. Get medical help right away. Call your local emergency services (911 in the [place] Do not drive yourself to the hospital.  Summary  It is common to have pain and stiffness in your shoulder and arm after the procedure. Put ice on the affected area and take pain medicine as told by your health care provider.  Do not use your arm to push yourself up in bed or from a chair.  Do exercises, including physical therapy, as told by your health care provider.  Check your incision area daily. Call your health care provider if you see signs of infection.  This information is not intended to replace advice given to you by your health care provider. Make sure you discuss any questions you have with your health care provider.  Document Revised: 06/02/2021 Document Reviewed: 06/02/2021  Elsevier Patient Education © 2023 [employer]    DIET: To avoid nausea, slowly advance diet as tolerated, avoiding spicy or greasy foods for the first day.  Add more substantial food to your diet according to your  physician's instructions.  Babies can be fed formula or breast milk as soon as they are hungry.  INCREASE FLUIDS AND FIBER TO AVOID CONSTIPATION.    SURGICAL DRESSING/BATHING: see above.    MEDICATIONS: Resume taking daily medication.  Take prescribed pain medication with food.  If no medication is prescribed, you may take non-aspirin pain medication if needed.  PAIN MEDICATION CAN BE VERY CONSTIPATING.  Take a stool softener or laxative such as senokot, pericolace, or milk of magnesia if needed.    Prescription given for Roxicodone, Colace, and Milk of Magnesia.  No pain medication given.    A follow-up appointment should be arranged with your doctor in 1-2 weeks; call to schedule.    You should CALL YOUR PHYSICIAN if you develop:  Fever greater than 101 degrees F.  Pain not relieved by medication, or persistent nausea or vomiting.  Excessive bleeding (blood soaking through dressing) or unexpected drainage from the wound.  Extreme redness or swelling around the incision site, drainage of pus or foul smelling drainage.  Inability to urinate or empty your bladder within 8 hours.  Problems with breathing or chest pain.    You should call 911 if you develop problems with breathing or chest pain.  If you are unable to contact your doctor or surgical center, you should go to the nearest emergency room or urgent care center.  Physician's telephone #: [phone]    MILD FLU-LIKE SYMPTOMS ARE NORMAL.  YOU MAY EXPERIENCE GENERALIZED MUSCLE ACHES, THROAT IRRITATION, HEADACHE AND/OR SOME NAUSEA.    If any questions arise, call your doctor.  If your doctor is not available, please feel free to call the Surgical Center at [phone].  The Center is open Monday through Friday from 7AM to 7PM.      A registered nurse may call you a few days after your surgery to see how you are doing after your procedure.    You may also receive a survey in the mail within the next two weeks and we ask that you take a few moments to complete  the survey and return it to us.  Our goal is to provide you with very good care and we value your comments.     Depression / Suicide Risk    As you are discharged from this [facility] facility, it is important to learn how to keep safe from harming yourself.    Recognize the warning signs:  Abrupt changes in personality, positive or negative- including increase in energy   Giving away possessions  Change in eating patterns- significant weight changes-  positive or negative  Change in sleeping patterns- unable to sleep or sleeping all the time   Unwillingness or inability to communicate  Depression  Unusual sadness, discouragement and loneliness  Talk of wanting to die  Neglect of personal appearance   Rebelliousness- reckless behavior  Withdrawal from people/activities they love  Confusion- inability to concentrate     If you or a loved one observes any of these behaviors or has concerns about self-harm, here's what you can do:  Talk about it- your feelings and reasons for harming yourself  Remove any means that you might use to hurt yourself (examples: pills, rope, extension cords, firearm)  Get professional help from the community (Mental Health, Substance Abuse, psychological counseling)  Do not be alone:Call your Safe Contact- someone whom you trust who will be there for you.  Call your local CRISIS HOTLINE 470-0797 or 119-313-3776  Call your local Children's Mobile Crisis Response Team [place] (118) 874-4069 or www.[employer]  Call the toll free National Suicide Prevention Hotlines   National Suicide Prevention Lifeline 840-298-AKIB (4450)  National Hope Line Network 800-SUICIDE (462-8973)    I acknowledge receipt and understanding of these Home Care instructions.

## 2024-06-10 NOTE — ANESTHESIA TIME REPORT
Anesthesia Start and Stop Event Times       Date Time Event    6/10/2024 0905 Ready for Procedure     0936 Anesthesia Start     1126 Anesthesia Stop          Responsible Staff  06/10/24      Name Role Begin End    Migue Quezada M.D. Anesth 0936 1126          Overtime Reason:  no overtime (within assigned shift)    Comments:

## 2024-06-10 NOTE — ANESTHESIA PREPROCEDURE EVALUATION
Case: [id number] Anesthesia Start Date/Time: 06/10/24 0936    Procedure: LEFT ARTHROPLASTY, SHOULDER, TOTAL, REVERSE (Left: Shoulder)    Anesthesia type: General    Diagnosis: Closed 3-part fracture of proximal humerus [S42.293A]    Pre-op diagnosis: Closed 3-part fracture of proximal humerus [S42.293A]    Location: [facility] 04 / SURGERY [facility]    Surgeons: Jamison Casitllo M.D.            Relevant Problems   CARDIAC   (positive) Aortic atherosclerosis (HCC)   (positive) Ascending aortic aneurysm (HCC)   (positive) Tortuous aorta (HCC)      GI   (positive) GERD (gastroesophageal reflux disease)         (positive) Hypertensive kidney disease with chronic kidney disease stage II      Other   (positive) Chronic compression fracture of L3 vertebra (HCC)   (positive) PVD (peripheral vascular disease) (HCC)       Physical Exam    Airway   Mallampati: II  TM distance: >3 FB  Neck ROM: full       Cardiovascular - normal exam  Rhythm: regular  Rate: normal  (-) murmur     Dental - normal exam           Pulmonary - normal exam  Breath sounds clear to auscultation     Abdominal    Neurological - normal exam                   Anesthesia Plan    ASA 2       Plan - general and peripheral nerve block     Peripheral nerve block will be post-op pain control  Airway plan will be ETT          Induction: intravenous    Postoperative Plan: Postoperative administration of opioids is intended.    Pertinent diagnostic labs and testing reviewed    Informed Consent:    Anesthetic plan and risks discussed with patient.    Use of blood products discussed with: patient whom consented to blood products.

## 2024-06-10 NOTE — H&P
Surgery Orthopedic History & Physical Note    Date  6/10/2024    Primary Care Physician  YEISON Page  Pre-Op Diagnosis Codes:     * Closed 3-part fracture of proximal humerus [S42.293A]    HPI  Chief Complaint:  Pain of the Left Shoulder     Last Surgery: No surgery found on No surgery found     HPI  Pain Assessment  Pain Assessment: 0-10  Pain Score: 9  Pain Location: Shoulder  Pain Orientation: Left  Pain Descriptors: Aching, Sharp  Pain Frequency: Intermittent     Patient is here for initial evaluation for left proximal humerus fracture sustained during ground-level fall.  He seen outside facility where x-rays and CT scan reviewed showing displaced four-part proximal humerus fracture with head split component              Review of Systems           Objective   General: Well nourished, well developed, age appropriate appearance   HEENT: Normocephalic, atraumatic  Psych: Normal mood and affect  Neck: Supple, no pain to motion  Chest/Pulmonary: breathing unlabored, no audible wheezing  Ortho: Left upper extremity: Sling in place.  Sensation tact axillary radial median nerves.  Fires deltoid.     Last Imaging Result(s):   CT-SHOULDER W/O PLUS RECONS LEFT  Narrative: 6/2/2024 5:11 PM     HISTORY/REASON FOR EXAM:  T-5000 HEAD INJURY, SHOULDER PAIN.     TECHNIQUE/ EXAM DESCRIPTION AND NUMBER OF VIEWS:  CT scan of the LEFT shoulder without contrast and including reconstructions.     Thin-section noncontrast helical images were obtained from the clavicle through the scapula. Coronal and sagittal reconstructions were generated.     Up to date radiation dose reduction adjustments have been utilized to meet ALARA standards for radiation dose reduction.     COMPARISON: Left shoulder radiographs earlier same day.     FINDINGS:  No clavicle fracture. No scapula or glenoid fracture. There was concern for possibility of a glenoid fracture on the radiographs earlier same day although on CT imaging here there is  no glenoid fracture present. Mild acromioclavicular degenerative   change.     Severely comminuted displaced and angulated humeral head and neck fracture. There are comminuted anteriorly displaced fracture fragments along the anterior inferior glenoid humeral articulation and into the axillary recess. Posterior angulation of the   dominant distal fracture. Joint hemarthrosis with mild inferior subluxation of the humeral head relative to glenoid.     Left lower lung opacity and volume loss suggesting atelectasis and/or infiltrate.  Impression: Severely comminuted and severely displaced humeral head and neck fracture.  DX-SHOULDER 2+ LEFT  Narrative: 6/2/2024 2:45 PM     HISTORY/REASON FOR EXAM:  Pain/Deformity Following Trauma.     TECHNIQUE/EXAM DESCRIPTION AND NUMBER OF VIEWS:  3 views of the LEFT shoulder.     COMPARISON: None     FINDINGS:  Comminuted displaced humeral head and neck fracture with deformity. Question fracture of the glenoid. Recommend further workup with CT of the shoulder for definitive evaluation of fracture anatomy.     Mild left basilar pulmonary opacity which may be atelectasis versus infiltrate.  Impression: Comminuted displaced humeral head and neck fracture with deformity. Question fracture of the glenoid. Recommend further workup with CT of the shoulder for definitive evaluation of fracture anatomy.  CT-HEAD W/O  Narrative: 6/2/2024 2:16 PM     HISTORY/REASON FOR EXAM:  Ground level fall + anticoagulants or bleeding disorder; Ground level fall + anticoagulants or bleeding disorder. S09.90-  Head Injury, unspecified     TECHNIQUE/EXAM DESCRIPTION AND NUMBER OF VIEWS:  CT of the head without contrast.     The study was performed on a helical multidetector CT scanner. Contiguous 2.5 mm axial sections were obtained from the skull base through the vertex.     Up to date radiation dose reduction adjustments have been utilized to meet ALARA standards for radiation dose reduction.     COMPARISON:   10/15/2023     FINDINGS:  Mild/moderate generalized volume loss.  Patchy hypodensities in cerebral white matter most likely represent moderate chronic microvascular ischemic changes. There is an old right thalamic lacunar infarct.     No acute intracranial hemorrhage, major vascular territory infarct, mass effect, midline shift or hydrocephalus.     Paranasal sinuses and mastoids are clear.  No depressed calvarial fracture.  Impression: 1.  Chronic microvascular ischemic type changes and old right thalamic lacunar infarct.  2.  No acute intracranial abnormality.              Assessment & Plan   Encounter Diagnoses:   Humeral head fracture, left, closed, initial encounter     We discussed options for management including operative versus nonoperative.  Based on his x-rays CT scan showing a head split component recommend reverse shoulder arthroplasty as he would likely do poorly with attempted fixation.  We discussed nonoperative management is also an option for him.  He would like to get this fixed as soon as possible.  We discussed risks of surgery at length including bleeding infection dislocation periprosthetic fracture.  He understands wish to move forward.     Admit post op for pain control and dispo planning.     Assessment/Plan:  Pre-Op Diagnosis Codes:     * Closed 3-part fracture of proximal humerus [S42.293A]  Procedure(s):  LEFT ARTHROPLASTY, SHOULDER, TOTAL, REVERSE

## 2024-06-10 NOTE — OP REPORT
DATE OF OPERATION: 6/10/2024     PREOPERATIVE DIAGNOSIS: Left four-part proximal humerus fracture with head split component    POSTOPERATIVE DIAGNOSIS: Same    PROCEDURE PERFORMED: Left reverse shoulder arthroplasty    SURGEON: Jamison Castillo M.D.     ASSISTANT: Deneen WEBB    ANESTHESIA: General    SPECIMEN: None    ESTIMATED BLOOD LOSS: 100 mL    IMPLANTS: Tornier revive reverse shoulder arthroplasty, 13/130 revive stem, 39+6 poly, 0 high offset tray, 39 mm standard glenosphere, 29 mm standard baseplate, 35 central screw, 42/38 peripheral screws      INDICATIONS: The patient is a 74 y.o. male who presented with left four-part proximal humerus fracture with head split component.  I discussed the risks and benefits of the procedure which include but are not limited to risks of infection, wound healing complication, neurovascular injury, pain, malunion, non-union, malrotation, and the medical risks of anesthesia including MI, stroke, and death.  Alternatives to surgery were also discussed, including non-operative management, which I did not recommend.  The patient was in agreement with the plan to proceed, and the informed consent was signed and documented.  I met with the patient pre-operatively and marked the operative extremity with their agreement.  We proceeded to the operating room.     DESCRIPTION OF PROCEDURE:  Patient was seen in the preoperative holding area on the day of surgery. The operative site was marked with my initials.  he was taken to the operating room and placed beachchair on the operative table.  Anesthesia was induced.  The operative extremity was prepped and draped in the normal sterile fashion.  Operative pause was conducted and the correct patient, site, side, procedure, and surgeon's initials on extremity were identified.  Standard deltopectoral approach was performed.  There is a large rent in the deltopectoral interval from the fracture.  This was utilized to gain access to the  injury.  Biceps tendon was identified and noted be disc located from the groove.  Performed a biceps tenodesis in the proximal aspect of the biceps tendon was released.  We localized the lesser and greater tuberosity fragments.  These were tagged with #5 Ethibond sutures for later repair.  We then remove the head from the glenohumeral joint.  We then placed our retractors without great visualization of the glenoid.  Soft tissues were removed including the labrum.  We then placed our pin inferior aspect of the glenoid with slight inferior tilt.  We then overdrilled reamed and placed our baseplate in position.  We drilled and placed our central screw with excellent bite.  We then drilled and placed 2 additional screws into the glenoid securing our baseplate.  It was secured at this point time.  Next we placed our 39 mm baseplate in position with again appropriate bite instability.  Next we placed our attention on the humerus.  We sequentially broached up to a size 13 x 130 revive stem with excellent chatter.  We then placed our trial implants into position.  We started off with a 0 high offset tray with a 39+6 poly.  The shoulder was reduced and noted to be extremity stable.  There is no gapping with shuck test.  Stable with lateral pole.  Good range of motion without impingement.  Shoulder was then redislocated and the trial implants were removed.  The wound was thoroughly irrigated sterile saline.  Then placed our final stem into position after drilling holes were humeral Ethibond sutures.  These were passed in our stem was placed in the position maintaining 20 degrees of retroversion.  Her final implants were then placed and the shoulder was reduced again noted to be extremely stable with excellent range of motion.  Wound was then irrigated once again with sterile saline.  We then tied our tuberosity sutures into position and placed our bone graft from the humeral head assisting with healing.  Again the shoulder  was brought through jeronimo full range of motion noted be stable without any gapping of the repair site.  Vancomycin tobramycin powder placed in the wound and the wound was closed in layered fashion.  Sterile dressings were applied he is placed in abduction sling.  He was awoken taken to PACU in stable condition.    POSTOPERATIVE PLAN: Nonweightbearing sling at all times left upper extremity.  Plan to discharge home from PACU today.  PACU x-rays prior to discharge.  The patient will follow up in clinic in 2 weeks to check wounds and remove sutures/staples.      ____________________________________   Jamison Castillo M.D.   DD: 6/10/2024  11:06 AM

## 2024-06-10 NOTE — OR NURSING
Pt is aaox4, resting comfortably in [facility] on 1lpm nasal cannula. His respirations are equal and unlabored, he is in no acute distress. He does not complain of any pain. He takes sips of water without difficulty or complaints of n/v. Surgical site is clean, dry and intact with ice pack in place for comfort. Pt LUE placed in abductor sling without difficulty, pt tolerates well. Pt has numbness to LUE, block placed prior to surgery. Will continue to monitor.

## 2024-06-10 NOTE — ANESTHESIA PROCEDURE NOTES
Airway    Date/Time: 6/10/2024 9:45 AM    Performed by: Migue Quezada M.D.  Authorized by: Migue Quezada M.D.    Location:  OR  Urgency:  Elective  Indications for Airway Management:  Anesthesia      Spontaneous Ventilation: absent    Sedation Level:  Deep  Preoxygenated: Yes    Patient Position:  Sniffing  Mask Difficulty Assessment:  1 - vent by mask  Final Airway Type:  Endotracheal airway  Final Endotracheal Airway:  ETT  Cuffed: Yes    Technique Used for Successful ETT Placement:  Direct laryngoscopy    Insertion Site:  Oral  Blade Type:  Annie  Laryngoscope Blade/Videolaryngoscope Blade Size:  3  ETT Size (mm):  7.5  Measured from:  Lips  Placement Verified by: auscultation and capnometry    Cormack-Lehane Classification:  Grade I - full view of glottis  Number of Attempts at Approach:  1  Number of Other Approaches Attempted:  0

## 2024-06-11 ASSESSMENT — PAIN SCALES - GENERAL: PAIN_LEVEL: 0

## 2024-06-11 NOTE — ANESTHESIA POSTPROCEDURE EVALUATION
Patient: Ramesh Manjarrez    Procedure Summary       Date: 06/10/24 Room / Location: [facility] 04 / SURGERY [facility]    Anesthesia Start: 0936 Anesthesia Stop: 1126    Procedure: LEFT ARTHROPLASTY, SHOULDER, TOTAL, REVERSE (Left: Shoulder) Diagnosis:       Closed 3-part fracture of proximal humerus      (Closed 3-part fracture of proximal humerus [S42.293A])    Surgeons: Jamison Castillo M.D. Responsible Provider: Migue Quezada M.D.    Anesthesia Type: general, peripheral nerve block ASA Status: 2            Final Anesthesia Type: general, peripheral nerve block  Last vitals  BP   Blood Pressure : 137/78    Temp   36.4 °C (97.6 °F)    Pulse   92   Resp   20    SpO2   96 %      Anesthesia Post Evaluation    Patient location during evaluation: PACU  Patient participation: complete - patient participated  Level of consciousness: awake and alert  Pain score: 0    Airway patency: patent  Anesthetic complications: no  Cardiovascular status: hemodynamically stable  Respiratory status: acceptable  Hydration status: euvolemic    PONV: none          There were no known notable events for this encounter.     Nurse Pain Score: 0 (NPRS)

## 2024-07-03 ENCOUNTER — HOME HEALTH ADMISSION (OUTPATIENT)
Dept: HOME HEALTH SERVICES | Facility: HOME HEALTHCARE | Age: 74
End: 2024-07-03
Payer: MEDICARE

## 2024-07-04 ENCOUNTER — HOME CARE VISIT (OUTPATIENT)
Dept: HOME HEALTH SERVICES | Facility: HOME HEALTHCARE | Age: 74
End: 2024-07-04
Payer: MEDICARE

## 2024-07-04 VITALS
RESPIRATION RATE: 16 BRPM | BODY MASS INDEX: 30.87 KG/M2 | DIASTOLIC BLOOD PRESSURE: 76 MMHG | SYSTOLIC BLOOD PRESSURE: 132 MMHG | HEART RATE: 84 BPM | WEIGHT: 192.06 LBS | TEMPERATURE: 97.8 F | HEIGHT: 66 IN | OXYGEN SATURATION: 94 %

## 2024-07-04 PROCEDURE — 665999 HH PPS REVENUE DEBIT

## 2024-07-04 PROCEDURE — G0151 HHCP-SERV OF PT,EA 15 MIN: HCPCS

## 2024-07-04 PROCEDURE — 665005 NO-PAY RAP - HOME HEALTH

## 2024-07-04 PROCEDURE — 665001 SOC-HOME HEALTH

## 2024-07-04 PROCEDURE — 665998 HH PPS REVENUE CREDIT

## 2024-07-04 SDOH — ECONOMIC STABILITY: FOOD INSECURITY: SNACKS PER DAY: 2

## 2024-07-04 SDOH — ECONOMIC STABILITY: FOOD INSECURITY: MEALS PER DAY: 2

## 2024-07-04 ASSESSMENT — ACTIVITIES OF DAILY LIVING (ADL)
OASIS_M1830: 03
AMBULATION ASSISTANCE ON FLAT SURFACES: 1

## 2024-07-04 ASSESSMENT — ENCOUNTER SYMPTOMS
PERSON REPORTING PAIN: PATIENT
PAIN SEVERITY GOAL: 0/10
CHANGE IN APPETITE: UNCHANGED
SHORTNESS OF BREATH: 1
HIGHEST PAIN SEVERITY IN PAST 24 HOURS: 2/10
VOMITING: DENIES
NAUSEA: DENIES
PAIN: 1
PAIN LOCATION - PAIN SEVERITY: 5/10
LOWEST PAIN SEVERITY IN PAST 24 HOURS: 0/10
BOWEL PATTERN NORMAL: 1
LAST BOWEL MOVEMENT: 67025
APPETITE LEVEL: GOOD
STOOL FREQUENCY: DAILY
DEBILITATING PAIN: 1
DYSPNEA ACTIVITY LEVEL: AFTER AMBULATING 10 - 20 FT
PAIN LOCATION: LEFT SHOULDER

## 2024-07-04 ASSESSMENT — FIBROSIS 4 INDEX: FIB4 SCORE: 1.56

## 2024-07-05 PROCEDURE — 665999 HH PPS REVENUE DEBIT

## 2024-07-05 PROCEDURE — 665998 HH PPS REVENUE CREDIT

## 2024-07-06 PROCEDURE — 665999 HH PPS REVENUE DEBIT

## 2024-07-06 PROCEDURE — 665998 HH PPS REVENUE CREDIT

## 2024-07-07 PROCEDURE — 665999 HH PPS REVENUE DEBIT

## 2024-07-07 PROCEDURE — 665998 HH PPS REVENUE CREDIT

## 2024-07-08 ENCOUNTER — DOCUMENTATION (OUTPATIENT)
Dept: MEDICAL GROUP | Facility: PHYSICIAN GROUP | Age: 74
End: 2024-07-08
Payer: MEDICARE

## 2024-07-08 PROCEDURE — 665998 HH PPS REVENUE CREDIT

## 2024-07-08 PROCEDURE — 665999 HH PPS REVENUE DEBIT

## 2024-07-09 PROCEDURE — 665999 HH PPS REVENUE DEBIT

## 2024-07-09 PROCEDURE — 665998 HH PPS REVENUE CREDIT

## 2024-07-10 PROCEDURE — 665999 HH PPS REVENUE DEBIT

## 2024-07-10 PROCEDURE — 665998 HH PPS REVENUE CREDIT

## 2024-07-11 ENCOUNTER — HOME CARE VISIT (OUTPATIENT)
Dept: HOME HEALTH SERVICES | Facility: HOME HEALTHCARE | Age: 74
End: 2024-07-11
Payer: MEDICARE

## 2024-07-11 PROCEDURE — G0151 HHCP-SERV OF PT,EA 15 MIN: HCPCS

## 2024-07-11 PROCEDURE — 665999 HH PPS REVENUE DEBIT

## 2024-07-11 PROCEDURE — 665998 HH PPS REVENUE CREDIT

## 2024-07-12 PROCEDURE — 665998 HH PPS REVENUE CREDIT

## 2024-07-12 PROCEDURE — 665999 HH PPS REVENUE DEBIT

## 2024-07-13 PROCEDURE — 665999 HH PPS REVENUE DEBIT

## 2024-07-13 PROCEDURE — 665998 HH PPS REVENUE CREDIT

## 2024-07-14 PROCEDURE — 665999 HH PPS REVENUE DEBIT

## 2024-07-14 PROCEDURE — 665998 HH PPS REVENUE CREDIT

## 2024-07-15 VITALS
SYSTOLIC BLOOD PRESSURE: 144 MMHG | DIASTOLIC BLOOD PRESSURE: 80 MMHG | HEART RATE: 83 BPM | RESPIRATION RATE: 16 BRPM | OXYGEN SATURATION: 95 % | TEMPERATURE: 98 F

## 2024-07-15 PROCEDURE — 665998 HH PPS REVENUE CREDIT

## 2024-07-15 PROCEDURE — 665999 HH PPS REVENUE DEBIT

## 2024-07-15 ASSESSMENT — ENCOUNTER SYMPTOMS
PAIN LOCATION - PAIN FREQUENCY: CONSTANT
PAIN LOCATION: LEFT SHOULDER
PAIN LOCATION - RELIEVING FACTORS: REST
PERSON REPORTING PAIN: PATIENT
PAIN LOCATION - PAIN DURATION: DAILY
PAIN LOCATION - PAIN SEVERITY: 3/10
PAIN: 1
PAIN LOCATION - PAIN QUALITY: ACHE

## 2024-07-16 PROCEDURE — 665999 HH PPS REVENUE DEBIT

## 2024-07-16 PROCEDURE — 665998 HH PPS REVENUE CREDIT

## 2024-07-17 ENCOUNTER — HOME CARE VISIT (OUTPATIENT)
Dept: HOME HEALTH SERVICES | Facility: HOME HEALTHCARE | Age: 74
End: 2024-07-17
Payer: MEDICARE

## 2024-07-17 PROCEDURE — 665998 HH PPS REVENUE CREDIT

## 2024-07-17 PROCEDURE — 665999 HH PPS REVENUE DEBIT

## 2024-07-18 ENCOUNTER — HOME CARE VISIT (OUTPATIENT)
Dept: HOME HEALTH SERVICES | Facility: HOME HEALTHCARE | Age: 74
End: 2024-07-18
Payer: MEDICARE

## 2024-07-18 VITALS
DIASTOLIC BLOOD PRESSURE: 72 MMHG | TEMPERATURE: 98.3 F | RESPIRATION RATE: 16 BRPM | OXYGEN SATURATION: 95 % | SYSTOLIC BLOOD PRESSURE: 126 MMHG | HEART RATE: 89 BPM

## 2024-07-18 PROCEDURE — 665999 HH PPS REVENUE DEBIT

## 2024-07-18 PROCEDURE — G0152 HHCP-SERV OF OT,EA 15 MIN: HCPCS

## 2024-07-18 PROCEDURE — 665998 HH PPS REVENUE CREDIT

## 2024-07-18 PROCEDURE — G0151 HHCP-SERV OF PT,EA 15 MIN: HCPCS

## 2024-07-19 ENCOUNTER — HOME CARE VISIT (OUTPATIENT)
Dept: HOME HEALTH SERVICES | Facility: HOME HEALTHCARE | Age: 74
End: 2024-07-19
Payer: MEDICARE

## 2024-07-19 PROCEDURE — 665999 HH PPS REVENUE DEBIT

## 2024-07-19 PROCEDURE — 665998 HH PPS REVENUE CREDIT

## 2024-07-19 SDOH — ECONOMIC STABILITY: HOUSING INSECURITY
HOME SAFETY: EDUCATED PT ON REMOVING OR SECURING AREA RUGS. PT VERBALIZES UNDERSTANDING AND VOICED THAT HE WILL NOT SECURE OR REMOVE HIS RUGS AND HE SEES THEM AS A REMINDER TO PICK UP HIS FEET.

## 2024-07-19 ASSESSMENT — ENCOUNTER SYMPTOMS
PAIN LOCATION - PAIN SEVERITY: 1/10
PAIN: 1
LOWEST PAIN SEVERITY IN PAST 24 HOURS: 0/10
SUBJECTIVE PAIN PROGRESSION: GRADUALLY IMPROVING
PAIN LOCATION: BACK
PAIN SEVERITY GOAL: 0/10
PERSON REPORTING PAIN: PATIENT
HIGHEST PAIN SEVERITY IN PAST 24 HOURS: 1/10

## 2024-07-19 ASSESSMENT — ACTIVITIES OF DAILY LIVING (ADL)
FEEDING_WITHIN_DEFINED_LIMITS: 1
GROOMING_WITHIN_DEFINED_LIMITS: 1

## 2024-07-20 PROCEDURE — 665999 HH PPS REVENUE DEBIT

## 2024-07-20 PROCEDURE — 665998 HH PPS REVENUE CREDIT

## 2024-07-21 PROCEDURE — 665998 HH PPS REVENUE CREDIT

## 2024-07-21 PROCEDURE — 665999 HH PPS REVENUE DEBIT

## 2024-07-22 PROCEDURE — 665998 HH PPS REVENUE CREDIT

## 2024-07-22 PROCEDURE — 665999 HH PPS REVENUE DEBIT

## 2024-07-22 ASSESSMENT — ENCOUNTER SYMPTOMS
PAIN LOCATION: LEFT SHOULDER
PAIN LOCATION - PAIN DURATION: DAILY
PERSON REPORTING PAIN: PATIENT
PAIN LOCATION - PAIN FREQUENCY: INTERMITTENT
PAIN LOCATION - PAIN QUALITY: SORE
PAIN LOCATION - RELIEVING FACTORS: REST, MEDICATION
PAIN: 1
PAIN LOCATION - PAIN SEVERITY: 2/10

## 2024-07-23 ENCOUNTER — HOME CARE VISIT (OUTPATIENT)
Dept: HOME HEALTH SERVICES | Facility: HOME HEALTHCARE | Age: 74
End: 2024-07-23
Payer: MEDICARE

## 2024-07-23 PROCEDURE — 665999 HH PPS REVENUE DEBIT

## 2024-07-23 PROCEDURE — 665998 HH PPS REVENUE CREDIT

## 2024-07-23 PROCEDURE — G0151 HHCP-SERV OF PT,EA 15 MIN: HCPCS

## 2024-07-24 ENCOUNTER — HOME CARE VISIT (OUTPATIENT)
Dept: HOME HEALTH SERVICES | Facility: HOME HEALTHCARE | Age: 74
End: 2024-07-24
Payer: MEDICARE

## 2024-07-24 PROCEDURE — 665998 HH PPS REVENUE CREDIT

## 2024-07-24 PROCEDURE — 665999 HH PPS REVENUE DEBIT

## 2024-07-25 PROCEDURE — 665999 HH PPS REVENUE DEBIT

## 2024-07-25 PROCEDURE — 665998 HH PPS REVENUE CREDIT

## 2024-07-26 VITALS
RESPIRATION RATE: 16 BRPM | SYSTOLIC BLOOD PRESSURE: 120 MMHG | TEMPERATURE: 98 F | OXYGEN SATURATION: 95 % | DIASTOLIC BLOOD PRESSURE: 76 MMHG | HEART RATE: 82 BPM

## 2024-07-26 PROCEDURE — 665999 HH PPS REVENUE DEBIT

## 2024-07-26 PROCEDURE — 665998 HH PPS REVENUE CREDIT

## 2024-07-26 ASSESSMENT — ENCOUNTER SYMPTOMS
PAIN LOCATION - PAIN FREQUENCY: CONSTANT
PAIN LOCATION - PAIN DURATION: DAILY
PERSON REPORTING PAIN: PATIENT
PAIN LOCATION: LEFT SHOULDER
PAIN LOCATION - PAIN QUALITY: ACHE/SORE
PAIN LOCATION - RELIEVING FACTORS: REST/MEDS
PAIN LOCATION - PAIN SEVERITY: 4/10
PAIN: 1

## 2024-07-27 PROCEDURE — 665998 HH PPS REVENUE CREDIT

## 2024-07-27 PROCEDURE — 665999 HH PPS REVENUE DEBIT

## 2024-07-28 PROCEDURE — 665999 HH PPS REVENUE DEBIT

## 2024-07-28 PROCEDURE — 665998 HH PPS REVENUE CREDIT

## 2024-07-29 PROCEDURE — 665999 HH PPS REVENUE DEBIT

## 2024-07-29 PROCEDURE — 665998 HH PPS REVENUE CREDIT

## 2024-07-30 PROCEDURE — 665998 HH PPS REVENUE CREDIT

## 2024-07-30 PROCEDURE — 665999 HH PPS REVENUE DEBIT

## 2024-07-31 ENCOUNTER — HOME CARE VISIT (OUTPATIENT)
Dept: HOME HEALTH SERVICES | Facility: HOME HEALTHCARE | Age: 74
End: 2024-07-31
Payer: MEDICARE

## 2024-07-31 PROCEDURE — G0151 HHCP-SERV OF PT,EA 15 MIN: HCPCS

## 2024-08-05 VITALS
DIASTOLIC BLOOD PRESSURE: 72 MMHG | OXYGEN SATURATION: 96 % | SYSTOLIC BLOOD PRESSURE: 124 MMHG | TEMPERATURE: 97.8 F | RESPIRATION RATE: 16 BRPM | HEART RATE: 78 BPM

## 2024-08-05 ASSESSMENT — ENCOUNTER SYMPTOMS
PAIN LOCATION - RELIEVING FACTORS: REST/MEDS
PAIN: 1
PAIN LOCATION - PAIN QUALITY: ACHE
PAIN LOCATION - PAIN SEVERITY: 5/10
DEBILITATING PAIN: 1
PERSON REPORTING PAIN: PATIENT
PAIN LOCATION: LEFT SHOULDER
PAIN LOCATION - PAIN DURATION: DAILY
PAIN LOCATION - PAIN FREQUENCY: CONSTANT

## 2024-08-07 ENCOUNTER — HOME CARE VISIT (OUTPATIENT)
Dept: HOME HEALTH SERVICES | Facility: HOME HEALTHCARE | Age: 74
End: 2024-08-07
Payer: MEDICARE

## 2024-08-07 PROCEDURE — G0151 HHCP-SERV OF PT,EA 15 MIN: HCPCS

## 2024-08-09 VITALS
SYSTOLIC BLOOD PRESSURE: 131 MMHG | OXYGEN SATURATION: 95 % | DIASTOLIC BLOOD PRESSURE: 84 MMHG | TEMPERATURE: 98.1 F | RESPIRATION RATE: 16 BRPM | HEART RATE: 81 BPM

## 2024-08-09 ASSESSMENT — ENCOUNTER SYMPTOMS
PAIN LOCATION - RELIEVING FACTORS: REST
PAIN: 1
DEBILITATING PAIN: 1
PAIN LOCATION - PAIN FREQUENCY: CONSTANT
PAIN LOCATION - PAIN SEVERITY: 4/10
PAIN LOCATION - PAIN QUALITY: ACHE
PAIN LOCATION: LEFT SHOULDER
PERSON REPORTING PAIN: PATIENT
PAIN LOCATION - PAIN DURATION: DAILY

## 2024-08-16 ENCOUNTER — HOME CARE VISIT (OUTPATIENT)
Dept: HOME HEALTH SERVICES | Facility: HOME HEALTHCARE | Age: 74
End: 2024-08-16
Payer: MEDICARE

## 2024-08-19 ENCOUNTER — HOME CARE VISIT (OUTPATIENT)
Dept: HOME HEALTH SERVICES | Facility: HOME HEALTHCARE | Age: 74
End: 2024-08-19
Payer: MEDICARE

## 2024-08-19 PROCEDURE — G0151 HHCP-SERV OF PT,EA 15 MIN: HCPCS

## 2024-08-21 ENCOUNTER — APPOINTMENT (OUTPATIENT)
Dept: MEDICAL GROUP | Facility: MEDICAL CENTER | Age: 74
End: 2024-08-21
Payer: MEDICARE

## 2024-08-22 VITALS
HEART RATE: 78 BPM | OXYGEN SATURATION: 97 % | RESPIRATION RATE: 17 BRPM | SYSTOLIC BLOOD PRESSURE: 125 MMHG | TEMPERATURE: 98 F | DIASTOLIC BLOOD PRESSURE: 68 MMHG

## 2024-08-22 ASSESSMENT — ENCOUNTER SYMPTOMS
PAIN: 1
PAIN LOCATION - PAIN FREQUENCY: CONSTANT
PAIN LOCATION: LEFT SHOULDER
PAIN LOCATION - PAIN SEVERITY: 2/10
PAIN LOCATION - EXACERBATING FACTORS: MOVEMENT/EXERCISE
PERSON REPORTING PAIN: PATIENT
PAIN LOCATION - RELIEVING FACTORS: REST/MEDICATION
PAIN LOCATION - PAIN DURATION: DAILY

## 2024-08-22 ASSESSMENT — ACTIVITIES OF DAILY LIVING (ADL)
OASIS_M1830: 01
HOME_HEALTH_OASIS: 00

## 2025-03-18 ENCOUNTER — TELEPHONE (OUTPATIENT)
Dept: HEALTH INFORMATION MANAGEMENT | Facility: OTHER | Age: 75
End: 2025-03-18

## 2025-03-20 ENCOUNTER — OFFICE VISIT (OUTPATIENT)
Dept: URGENT CARE | Facility: CLINIC | Age: 75
End: 2025-03-20
Payer: MEDICARE

## 2025-03-20 ENCOUNTER — HOSPITAL ENCOUNTER (OUTPATIENT)
Facility: MEDICAL CENTER | Age: 75
End: 2025-03-20
Attending: PHYSICIAN ASSISTANT
Payer: MEDICARE

## 2025-03-20 VITALS
OXYGEN SATURATION: 95 % | TEMPERATURE: 98.1 F | SYSTOLIC BLOOD PRESSURE: 144 MMHG | HEIGHT: 66 IN | HEART RATE: 74 BPM | DIASTOLIC BLOOD PRESSURE: 86 MMHG | WEIGHT: 192 LBS | BODY MASS INDEX: 30.86 KG/M2 | RESPIRATION RATE: 16 BRPM

## 2025-03-20 DIAGNOSIS — N39.0 URINARY TRACT INFECTION WITH HEMATURIA, SITE UNSPECIFIED: ICD-10-CM

## 2025-03-20 DIAGNOSIS — R31.9 URINARY TRACT INFECTION WITH HEMATURIA, SITE UNSPECIFIED: ICD-10-CM

## 2025-03-20 DIAGNOSIS — N40.0 BENIGN PROSTATIC HYPERPLASIA, UNSPECIFIED WHETHER LOWER URINARY TRACT SYMPTOMS PRESENT: ICD-10-CM

## 2025-03-20 DIAGNOSIS — R31.9 HEMATURIA, UNSPECIFIED TYPE: ICD-10-CM

## 2025-03-20 LAB
APPEARANCE UR: NORMAL
BILIRUB UR STRIP-MCNC: NEGATIVE MG/DL
COLOR UR AUTO: NORMAL
GLUCOSE UR STRIP.AUTO-MCNC: NEGATIVE MG/DL
KETONES UR STRIP.AUTO-MCNC: NORMAL MG/DL
LEUKOCYTE ESTERASE UR QL STRIP.AUTO: NORMAL
NITRITE UR QL STRIP.AUTO: NEGATIVE
PH UR STRIP.AUTO: 6 [PH] (ref 5–8)
PROT UR QL STRIP: NORMAL MG/DL
RBC UR QL AUTO: NORMAL
SP GR UR STRIP.AUTO: >=1.03
UROBILINOGEN UR STRIP-MCNC: NORMAL MG/DL

## 2025-03-20 PROCEDURE — 87086 URINE CULTURE/COLONY COUNT: CPT

## 2025-03-20 PROCEDURE — 81002 URINALYSIS NONAUTO W/O SCOPE: CPT | Performed by: PHYSICIAN ASSISTANT

## 2025-03-20 PROCEDURE — 3077F SYST BP >= 140 MM HG: CPT | Performed by: PHYSICIAN ASSISTANT

## 2025-03-20 PROCEDURE — 99214 OFFICE O/P EST MOD 30 MIN: CPT | Performed by: PHYSICIAN ASSISTANT

## 2025-03-20 PROCEDURE — 3079F DIAST BP 80-89 MM HG: CPT | Performed by: PHYSICIAN ASSISTANT

## 2025-03-20 PROCEDURE — 87186 SC STD MICRODIL/AGAR DIL: CPT

## 2025-03-20 PROCEDURE — 87077 CULTURE AEROBIC IDENTIFY: CPT

## 2025-03-20 RX ORDER — SULFAMETHOXAZOLE AND TRIMETHOPRIM 800; 160 MG/1; MG/1
1 TABLET ORAL 2 TIMES DAILY
Qty: 14 TABLET | Refills: 0 | Status: SHIPPED | OUTPATIENT
Start: 2025-03-20 | End: 2025-03-27

## 2025-03-20 ASSESSMENT — ENCOUNTER SYMPTOMS
BACK PAIN: 0
ABDOMINAL PAIN: 0
WEIGHT LOSS: 0
NAUSEA: 0
DIAPHORESIS: 0
MYALGIAS: 0
DIZZINESS: 0
VOMITING: 0
CHILLS: 0
HEADACHES: 0
FLANK PAIN: 0
FEVER: 0
WEAKNESS: 0

## 2025-03-20 ASSESSMENT — FIBROSIS 4 INDEX: FIB4 SCORE: 1.58

## 2025-03-21 NOTE — PROGRESS NOTES
Subjective:     CHIEF COMPLAINT  Chief Complaint   Patient presents with    UTI     X today/ frequency in urination/ red urine/ irration in urination        HPI  Ramesh Manjarrez is a very pleasant 75 y.o. male who presents to the clinic with urinary complaints starting this morning.  Patient states throughout the day today he has noted urinary frequency and urgency.  States he has been having to urinate multiple times per hour and only voiding small amounts.  Has noted some slight dysuria associated with urination at the tip of his penis intermittently throughout the day.  Starting a couple hours ago he noted small amounts of blood at the end of his urinary stream.  He denies any associated abdominal pain, flank pain, nausea, vomiting, fevers or chills.  Patient has a past medical history of BPH with elevated PSA.  He has undergone 4 previous biopsies that have all returned benign.  States he frequently has to get up 1-2 times nightly to urinate, he is not taking Flomax.  Denies any prior history of nephrolithiasis.  No history of bladder cancer.  Has a distant history of smoking which he quit in the 80s.    REVIEW OF SYSTEMS  Review of Systems   Constitutional:  Negative for chills, diaphoresis, fever, malaise/fatigue and weight loss.   Gastrointestinal:  Negative for abdominal pain, nausea and vomiting.   Genitourinary:  Positive for dysuria, frequency, hematuria and urgency. Negative for flank pain.   Musculoskeletal:  Negative for back pain and myalgias.   Skin:  Negative for rash.   Neurological:  Negative for dizziness, weakness and headaches.       PAST MEDICAL HISTORY  Patient Active Problem List    Diagnosis Date Noted    Closed 3-part fracture of proximal humerus 06/05/2024    Statin myopathy 05/22/2024    GERD (gastroesophageal reflux disease) 02/21/2024    Normocytic anemia 10/15/2023    No contraindication to deep vein thrombosis (DVT) prophylaxis 08/23/2023    Ascending aortic aneurysm (HCC)  "08/23/2023    Closed fracture of one rib of left side 08/22/2023    Multiple closed pelvic fractures without disruption of pelvic [place] (HCC) 08/22/2023    Chronic compression fracture of L3 vertebra ([facility]) 08/22/2023    BPH with elevated PSA 02/15/2023    Tinnitus, bilateral 02/15/2023    PVD (peripheral vascular disease) ([facility]) 04/20/2022    Hyperopic astigmatism of both eyes 12/11/2020    Hypertensive kidney disease with chronic kidney disease stage II 10/20/2020    Aortic atherosclerosis ([facility]) 10/20/2020    Optic atrophy 01/13/2020    Fuchs' corneal dystrophy 01/13/2020    Tortuous aorta ([facility]) 02/04/2019    Hyperlipidemia 10/10/2017       SURGICAL HISTORY   has a past surgical history that includes prostate needle biopsy; inguinal hernia repair (Left); cataract extraction with iol (Bilateral); other orthopedic surgery; and shoulder arthroplasty total reversed (Left, 6/10/2024).    ALLERGIES  Allergies   Allergen Reactions    Aspirin Unspecified     Worsening tinnitus  Other reaction(s): Other (See Comments)  TINNITUS      Atorvastatin Myalgia    Codeine Unspecified     Other reaction(s): Unknown  \"Made me crazy\"       CURRENT MEDICATIONS  Home Medications       Reviewed by Leo Gonsalez P.A.-C. (Physician Assistant) on 03/20/25 at 1935  Med List Status: <None>     Medication Last Dose Status   acetaminophen (TYLENOL) 500 MG Tab Not Taking Active   amoxicillin (AMOXIL) 500 MG Cap Not Taking Active   Ascorbic Acid (VITAMIN C) 1000 MG Tab Taking Active   CALCIUM PO Taking Active   Cholecalciferol (VITAMIN D-3 PO) Taking Active   Coenzyme Q10 (COQ-10 PO) Taking Active   Cyanocobalamin (VITAMIN B12 PO) Taking Active   Dextran 70-Hypromellose (ARTIFICIAL TEARS) 0.1-0.3 % Solution PRN Active   GARLIC PO Taking Active   Ginkgo Biloba (GINKOBA PO) Taking Active   GLUCOSAMINE SULFATE PO Taking Active   LYSINE HCL PO Taking Active   magnesium hydroxide (MILK OF MAGNESIA) 400 MG/5ML Suspension Taking Active   MAGNESIUM PO " "Taking Active   non-formulary med Unknown Active   non-formulary med Unknown Active   PHOSPHATIDYLSERINE PO Taking Active   Red Yeast Rice Extract (RED YEAST RICE PO) Taking Active   ZINC GLYCINATE PO Taking Active                    SOCIAL HISTORY  Social History     Tobacco Use    Smoking status: Former     Current packs/day: 0.00     Average packs/day: 1 pack/day for 10.0 years (10.0 ttl pk-yrs)     Types: Cigarettes     Start date:      Quit date:      Years since quittin.2    Smokeless tobacco: Never   Vaping Use    Vaping status: Never Used   Substance and Sexual Activity    Alcohol use: Yes     Comment: 1 drink a month    Drug use: Never    Sexual activity: Not Currently       FAMILY HISTORY  Family History   Problem Relation Age of Onset    Other Mother         Liver disease    Diabetes Father     Heart Attack Father         76          Objective:     VITAL SIGNS: BP (!) 144/86   Pulse 74   Temp 36.7 °C (98.1 °F) (Temporal)   Resp 16   Ht 1.676 m (5' 6\")   Wt 87.1 kg (192 lb)   SpO2 95%   BMI 30.99 kg/m²     PHYSICAL EXAM  Physical Exam  Constitutional:       General: He is not in acute distress.     Appearance: Normal appearance. He is not ill-appearing, toxic-appearing or diaphoretic.   HENT:      Head: Normocephalic and atraumatic.   Eyes:      Conjunctiva/sclera: Conjunctivae normal.   Cardiovascular:      Rate and Rhythm: Normal rate and regular rhythm.      Pulses: Normal pulses.      Heart sounds: Normal heart sounds.   Pulmonary:      Effort: Pulmonary effort is normal.   Abdominal:      General: Bowel sounds are normal. There is no distension.      Palpations: Abdomen is soft.      Tenderness: There is no abdominal tenderness. There is no right CVA tenderness, left CVA tenderness, guarding or rebound.      Hernia: No hernia is present.   Neurological:      General: No focal deficit present.      Mental Status: He is alert and oriented to person, place, and time. Mental status is " at baseline.       Lab Results/POC Test Results   Results for orders placed or performed in visit on 03/20/25   POCT Urinalysis    Collection Time: 03/20/25  7:27 PM   Result Value Ref Range    POC Color red Negative    POC Appearance cloudy Negative    POC Glucose negative Negative mg/dL    POC Bilirubin negative Negative mg/dL    POC Ketones trace Negative mg/dL    POC Specific Gravity >=1.030 <1.005 - >1.030    POC Blood large Negative    POC Urine PH 6.0 5.0 - 8.0    POC Protein >= 300 mg/dl Negative mg/dL    POC Urobiligen 1.0 e.u./dl Negative (0.2) mg/dL    POC Nitrites negative Negative    POC Leukocyte Esterase trace Negative           Assessment/Plan:     1. Urinary tract infection with hematuria, site unspecified  POCT Urinalysis    URINE CULTURE(NEW)    sulfamethoxazole-trimethoprim (BACTRIM DS) 800-160 MG tablet    Referral to Urology      2. Benign prostatic hyperplasia, unspecified whether lower urinary tract symptoms present  Referral to Urology      3. Hematuria, unspecified type  Referral to Urology          MDM/Comments:    Very pleasant and well-appearing 75-year-old male presented to the clinic with urinary frequency, urgency, dysuria and hematuria x 1 day.  In clinic urinalysis reveals trace leuks, protein, blood and trace ketones.  Denies any associated abdominal pain, flank pain, nausea, vomiting, fevers or chills.  Vital stable and reassuring.  Discussed differentials including UTI versus prostatitis versus nephrolithiasis versus malignancy.  At this time the patient would like to trial a course of Bactrim to cover for UTI.  I will send his urine out for culture and follow-up once available.  Will place referral for the patient to follow-up with urology.  Discussed if symptoms persist or worsen to follow-up in the ED so that further workup including labs and imaging can be performed.  Patient was agreeable to this plan.    Differential diagnosis, natural history, supportive care, and  indications for immediate follow-up discussed. All questions answered. Patient agrees with the plan of care.    Follow-up as needed if symptoms worsen or fail to improve to PCP, Urgent care or Emergency Room.    I have personally reviewed prior external notes and test results pertinent to today's visit.  I have independently reviewed and interpreted all diagnostics ordered during this urgent care acute visit.   Discussed management options (risks,benefits, and alternatives to treatment). Pt expresses understanding and the treatment plan was agreed upon. Questions were encouraged and answered to pt's satisfaction.    Please note that this dictation was created using voice recognition software. I have made a reasonable attempt to correct obvious errors, but I expect that there are errors of grammar and possibly content that I did not discover before finalizing the note.

## 2025-03-23 LAB
BACTERIA UR CULT: ABNORMAL
BACTERIA UR CULT: ABNORMAL
SIGNIFICANT IND 70042: ABNORMAL
SITE SITE: ABNORMAL
SOURCE SOURCE: ABNORMAL

## 2025-03-31 ENCOUNTER — HOSPITAL ENCOUNTER (OUTPATIENT)
Facility: MEDICAL CENTER | Age: 75
End: 2025-03-31
Attending: UROLOGY
Payer: MEDICARE

## 2025-03-31 PROCEDURE — 87086 URINE CULTURE/COLONY COUNT: CPT

## 2025-03-31 PROCEDURE — 87077 CULTURE AEROBIC IDENTIFY: CPT

## 2025-03-31 PROCEDURE — 87186 SC STD MICRODIL/AGAR DIL: CPT

## 2025-05-23 ENCOUNTER — OFFICE VISIT (OUTPATIENT)
Dept: MEDICAL GROUP | Facility: MEDICAL CENTER | Age: 75
End: 2025-05-23
Payer: MEDICARE

## 2025-05-23 VITALS
BODY MASS INDEX: 31.47 KG/M2 | DIASTOLIC BLOOD PRESSURE: 76 MMHG | WEIGHT: 195.8 LBS | HEIGHT: 66 IN | HEART RATE: 79 BPM | SYSTOLIC BLOOD PRESSURE: 122 MMHG | TEMPERATURE: 97.9 F | OXYGEN SATURATION: 94 % | RESPIRATION RATE: 14 BRPM

## 2025-05-23 DIAGNOSIS — E78.5 HYPERLIPIDEMIA, UNSPECIFIED HYPERLIPIDEMIA TYPE: ICD-10-CM

## 2025-05-23 DIAGNOSIS — S32.030S COMPRESSION FRACTURE OF L3 VERTEBRA, SEQUELA: ICD-10-CM

## 2025-05-23 DIAGNOSIS — Z00.00 ENCOUNTER FOR MEDICARE ANNUAL WELLNESS EXAM: Primary | ICD-10-CM

## 2025-05-23 DIAGNOSIS — Z96.612 STATUS POST REPLACEMENT OF LEFT SHOULDER JOINT: ICD-10-CM

## 2025-05-23 DIAGNOSIS — I12.9 HYPERTENSIVE KIDNEY DISEASE WITH CHRONIC KIDNEY DISEASE STAGE II: ICD-10-CM

## 2025-05-23 DIAGNOSIS — I73.9 PVD (PERIPHERAL VASCULAR DISEASE) (HCC): ICD-10-CM

## 2025-05-23 DIAGNOSIS — Z11.59 NEED FOR HEPATITIS C SCREENING TEST: ICD-10-CM

## 2025-05-23 DIAGNOSIS — I71.21 ANEURYSM OF ASCENDING AORTA WITHOUT RUPTURE (HCC): ICD-10-CM

## 2025-05-23 DIAGNOSIS — D64.9 NORMOCYTIC ANEMIA: ICD-10-CM

## 2025-05-23 DIAGNOSIS — R29.6 FREQUENT FALLS: ICD-10-CM

## 2025-05-23 DIAGNOSIS — N18.2 HYPERTENSIVE KIDNEY DISEASE WITH CHRONIC KIDNEY DISEASE STAGE II: ICD-10-CM

## 2025-05-23 ASSESSMENT — ENCOUNTER SYMPTOMS: GENERAL WELL-BEING: GOOD

## 2025-05-23 ASSESSMENT — FIBROSIS 4 INDEX: FIB4 SCORE: 1.58

## 2025-05-23 ASSESSMENT — PATIENT HEALTH QUESTIONNAIRE - PHQ9: CLINICAL INTERPRETATION OF PHQ2 SCORE: 0

## 2025-05-23 ASSESSMENT — ACTIVITIES OF DAILY LIVING (ADL): BATHING_REQUIRES_ASSISTANCE: 1

## 2025-05-23 NOTE — PROGRESS NOTES
Chief Complaint   Patient presents with    Medicare Annual Wellness       HPI:  Ramesh Manjarrez is a 75 y.o. here for Medicare Annual Wellness Visit     History of Present Illness  The patient presents for an annual wellness visit.    Dietary Habits and Bowel Movements  He maintains a low-fat diet, abstaining from fast food and processed foods. His dietary intake is rich in protein, including ground turkey, chicken, and occasional steak. Breakfast typically consists of an egg and cheese burrito. He acknowledges the need to increase his fruit and vegetable consumption, despite his fondness for fruit. He also incorporates organic cereal into his diet. His bowel movements are regular and soft, without any straining or excessive frequency. He experienced constipation last week, which he managed by increasing his water intake and consuming apple juice. He consumes approximately three 12-ounce bottles of water daily.  - Onset: Constipation last week.  - Alleviating Factors: Increased water intake and consuming apple juice.  - Severity: Regular and soft bowel movements, no straining or excessive frequency.    Physical Activity and Falls  He does not engage in any physical exercise but expresses interest in water aerobics. He uses a cane for stability during short walks and can walk approximately 20 feet before needing to rest. He reports no respiratory issues and has discontinued oxygen use. He owns a portable oxygen machine but does not use it. He has fallen 4 to 5 times this year, including a recent fall where he landed on his knees, resulting in leg pain. He continues to use a wheelchair at home for comfort, despite owning a walker. He feels safe at home and does not believe he needs a life alert button. He keeps his phone with him at all times for emergencies. He does not consume alcohol or smoke.  - Onset: Falls 4 to 5 times this year, including a recent fall.  - Location: Leg pain from landing on knees.  -  Duration: Ongoing use of cane and wheelchair.  - Character: Leg pain from falls.  - Alleviating Factors: Use of cane, wheelchair, and phone for emergencies.  - Severity: Requires rest after walking 20 feet, uses cane and wheelchair for stability and comfort.    Shoulder Replacement Surgery  He underwent shoulder replacement surgery on 06/10/2024, which has since healed but remains stiff. He has completed physical therapy for his shoulder.  - Onset: Surgery on 06/10/2024.  - Duration: Healed but remains stiff.  - Character: Stiffness in shoulder.  - Alleviating Factors: Completed physical therapy.    Urinary Tract Infection  He was previously prescribed antibiotics for a urinary tract infection, which has since resolved. He notes that his urine appears normal as long as he maintains adequate hydration.  - Onset: Resolved after antibiotic treatment.  - Alleviating Factors: Adequate hydration.  - Severity: Urine appears normal with adequate hydration.    Aneurysm Monitoring  He has an aneurysm and is due for a CT scan to monitor its size. He prefers non-contrast CT scans due to previous adverse reactions to contrast dye.  - Onset: Due for CT scan.  - Alleviating Factors: Prefers non-contrast CT scans.    Influenza or Cold  He had influenza or cold, which took him 2 weeks to get over. He got a lot of mucus from it and is still coughing up a little bit every now and then.  - Onset: Took 2 weeks to get over.  - Duration: 2 weeks.  - Character: A lot of mucus, still coughing up a little bit.    PAST SURGICAL HISTORY:  Shoulder replacement surgery on 06/10/2024.    SOCIAL HISTORY  He does not drink or smoke.        Patient Active Problem List    Diagnosis Date Noted    Status post replacement of left shoulder joint 06/05/2024    Statin myopathy 05/22/2024    GERD (gastroesophageal reflux disease) 02/21/2024    Normocytic anemia 10/15/2023    No contraindication to deep vein thrombosis (DVT) prophylaxis 08/23/2023     Ascending aortic aneurysm (HCC) 08/23/2023    Closed fracture of one rib of left side 08/22/2023    Multiple closed pelvic fractures without disruption of pelvic [place] [facility] 08/22/2023    Chronic compression fracture of L3 vertebra ([facility]) 08/22/2023    BPH with elevated PSA 02/15/2023    Tinnitus, bilateral 02/15/2023    PVD (peripheral vascular disease) ([facility]) 04/20/2022    Hyperopic astigmatism of both eyes 12/11/2020    Hypertensive kidney disease with chronic kidney disease stage II 10/20/2020    Aortic atherosclerosis ([facility]) 10/20/2020    Optic atrophy 01/13/2020    Fuchs' corneal dystrophy 01/13/2020    Tortuous aorta ([facility]) 02/04/2019    Hyperlipidemia 10/10/2017       Current Medications[1]       Current supplements as per medication list.     Allergies: Aspirin, Atorvastatin, and Codeine    Current social contact/activities:      He  reports that he quit smoking about 36 years ago. His smoking use included cigarettes. He started smoking about 46 years ago. He has a 10 pack-year smoking history. He has never used smokeless tobacco. He reports current alcohol use. He reports that he does not use drugs.  Counseling given: Not Answered      ROS:    Gait: Uses a cane  Ostomy: No  Other tubes: No  Amputations: No  Chronic oxygen use: No  Last eye exam: 2025  Wears hearing aids: Yes   : Reports urinary leakage during the last 6 months that has not interfered at all with their daily activities or sleep.    Screening:    Depression Screening  Little interest or pleasure in doing things?  0 - not at all  Feeling down, depressed , or hopeless? 0 - not at all  Patient Health Questionnaire Score: 0     If depressive symptoms identified deferred to follow up visit unless specifically addressed in assessment and plan.    Interpretation of PHQ-9 Total Score   Score Severity   1-4 No Depression   5-9 Mild Depression   10-14 Moderate Depression   15-19 Moderately Severe Depression   20-27 Severe Depression    Screening for  Cognitive Impairment  Do you or any of your friends or family members have any concern about your memory? No  Three Minute Recall (Village, Kitchen, Baby) 3/3    Adeel clock face with all 12 numbers and set the hands to show 10 minutes past 11.  Yes    Cognitive concerns identified deferred for follow up unless specifically addressed in assessment and plan.    Fall Risk Assessment  Has the patient had two or more falls in the last year or any fall with injury in the last year?  Yes    Safety Assessment  Do you always wear your seatbelt?  Yes  Any changes to home needed to function safely? No  Difficulty hearing.  Yes  Patient counseled about all safety risks that were identified.    Functional Assessment ADLs  Are there any barriers preventing you from cooking for yourself or meeting nutritional needs?  No.    Are there any barriers preventing you from driving safely or obtaining transportation?  Yes.    Are there any barriers preventing you from using a telephone or calling for help?  No    Are there any barriers preventing you from shopping?  No.    Are there any barriers preventing you from taking care of your own finances?  No    Are there any barriers preventing you from managing your medications?  No    Are there any barriers preventing you from showering, bathing or dressing yourself? Yes Just takes some time but can do it   Are there any barriers preventing you from doing housework or laundry? No  Are there any barriers preventing you from using the toilet?No  Are you currently engaging in any exercise or physical activity?  No.      Self-Assessment of Health  What is your perception of your health? Good    Do you sleep more than six hours a night? Yes    In the past 7 days, how much did pain keep you from doing your normal work? None    Do you spend quality time with family or friends (virtually or in person)? Yes    Do you usually eat a heart healthy diet that constists of a variety of fruits, vegetables,  whole grains and fiber? Yes    Do you eat foods high in fat and/or Fast Food more than three times per week? No    How concerned are you that your medical conditions are not being well managed? a little    Are you worried that in the next 2 months, you may not have stable housing that you own, rent, or stay in as part of a household? No      Advance Care Planning  Do you have an Advance Directive, Living Will, Durable Power of , or POLST? No                 Health Maintenance Summary            Current Care Gaps       Zoster (Shingles) Vaccines (1 of 2) Never done     No completion history exists for this topic.              COVID-19 Vaccine (1 - 2024-25 season) Never done     No completion history exists for this topic.                      Awaiting Completion       Hepatitis C Screening (Once) Order placed this encounter      05/23/2025  Order placed for HEP C VIRUS ANTIBODY by Armaan Koch D.O.                      Upcoming       Influenza Vaccine (Season Ended) Next due on 9/1/2025 11/25/2008  Imm Admin: Influenza, Unspecified - HISTORICAL DATA              Annual Wellness Visit (Yearly) Next due on 5/23/2026 05/23/2025  Visit Dx: Encounter for Medicare annual wellness exam    05/22/2024  Level of Service: ANNUAL WELLNESS VISIT-INCLUDES PPPS SUBSEQUE*    05/22/2024  Visit Dx: Encounter for Medicare annual wellness exam    09/06/2022      10/18/2021       Only the first 5 history entries have been loaded, but more history exists.            IMM DTaP/Tdap/Td Vaccine (2 - Td or Tdap) Next due on 10/24/2030      10/24/2020  Imm Admin: Tdap Vaccine    12/07/2010  Imm Admin: TD Vaccine                      Completed or No Longer Recommended       Pneumococcal Vaccine: 50+ Years (Series Information) Completed      11/27/2018  Imm Admin: Pneumococcal Conjugate Vaccine (Prevnar/PCV-13)    05/20/2016  Imm Admin: Pneumococcal Conjugate Vaccine (Prevnar/PCV-13)    02/06/2014  Imm Admin: Pneumococcal  polysaccharide vaccine (PPSV-23)              Hepatitis A Vaccine (Hep A) (Series Information) Aged Out      No completion history exists for this topic.              Hepatitis B Vaccine (Hep B) (Series Information) Aged Out     No completion history exists for this topic.              HPV Vaccines (Series Information) Aged Out     No completion history exists for this topic.              Polio Vaccine (Inactivated Polio) (Series Information) Aged Out     No completion history exists for this topic.              Meningococcal Immunization (Series Information) Aged Out     No completion history exists for this topic.              Meningococcal B Vaccine (Series Information) Aged Out     No completion history exists for this topic.                            Patient Care Team:  Armaan Koch D.O. as PCP - General (Family Medicine)  Rosanna Eid, PT, DPT as Transitional Care Navigator  [facility] (Home Health)        Social History[2]  Family History   Problem Relation Age of Onset    Other Mother         Liver disease    Diabetes Father     Heart Attack Father         76     He  has a past medical history of Acute encephalopathy (10/15/2023), Acute hypoxic respiratory failure (HCC) (10/15/2023), Acute kidney injury (HCC) (10/15/2023), Anemia, Anesthesia, Arrhythmia, Arthritis, Bilateral atelectasis (8/23/2023), Bowel habit changes, BPH with elevated PSA, Cataract, Dental disorder, Enterococcus faecalis UTI (urinary tract infection) (10/16/2023), Heart burn, Heart valve disease, High cholesterol, Hyperbilirubinemia (10/15/2023), Hypertension, Hypokalemia (10/17/2023), Hypophosphatemia (10/15/2023), Indigestion, Pain, Polyneuropathy in other diseases classified elsewhere ([facility]) (05/19/2015), PONV (postoperative nausea and vomiting), Recurrent major depressive disorder, in partial remission ([facility]) (01[birth year]01[birth year]1960), Senile purpura ([facility]) (10/10/2017), Sepsis ([facility]) (10/15/2023), Tinnitus, bilateral, Trauma  "(8/22/2023), and Urinary incontinence.   Past Surgical History[3]    Exam:   /76   Pulse 79   Temp 36.6 °C (97.9 °F) (Temporal)   Resp 14   Ht 1.676 m (5' 6\")   Wt 88.8 kg (195 lb 12.8 oz)   SpO2 94%  Body mass index is 31.6 kg/m².    Hearing fair.    Dentition fair  Alert, oriented in no acute distress.  Eye contact is good, speech goal directed, affect calm  Physical Exam  General Appearance: Normal.  Vital signs: Within normal limits.  HEENT: Within normal limits.  Respiratory: Clear to auscultation, no wheezing, rales or rhonchi.  Back, Musculoskeletal: Left shoulder is stiff with limited range of motion.  Skin: Warm and dry, no rash.  Neurological: Normal.          Results  Imaging   - CT scan of the chest, abdomen, and pelvis: 2023, 4.5 cm ascending thoracic aortic aneurysm       Assessment and Plan. The following treatment and monitoring plan is recommended:    1. Encounter for Medicare annual wellness exam    2. Frequent falls  - Referral to Physical Therapy    3. Status post replacement of left shoulder joint  - Referral to Physical Therapy    4. Compression fracture of L3 vertebra, sequela  - Referral to Physical Therapy    5. Hyperlipidemia, unspecified hyperlipidemia type  - Comp Metabolic Panel; Future  - Lipid Profile; Future    6. Hypertensive kidney disease with chronic kidney disease stage II  - Comp Metabolic Panel; Future  - TSH WITH REFLEX TO FT4; Future    7. Normocytic anemia  - CBC WITHOUT DIFFERENTIAL; Future    8. PVD (peripheral vascular disease) (HCC)  - Comp Metabolic Panel; Future  - Lipid Profile; Future    9. Aneurysm of ascending aorta without rupture (HCC)  - CT-CHEST (THORAX) W/O; Future    10. Need for hepatitis C screening test  - HEP C VIRUS ANTIBODY; Future      Assessment & Plan  1. Annual wellness visit.  - Weight recorded at 88 kg. Advised to increase water intake and incorporate prunes into the diet to alleviate constipation. Emphasized the importance of " hydration for stool and urinary health.  - Encouraged to participate in water aerobics classes at the [facility], scheduled for Monday, Wednesday, and Friday at 10 AM.  - Referral for physical therapy initiated to address balance issues and shoulder stiffness. Counseled on fall prevention strategies, including keeping trip hazards off the floor and ensuring he always has his phone with him to call for help if needed.  - DMV form provided for completion and submission.    2. Urinary tract infection: Resolved.  - Reported a previous urinary tract infection treated with antibiotics. Urine appears better with adequate water intake.    3. Ascending thoracic aortic aneurysm: Stable. 4.5 cm.  - Non-contrast CT scan ordered to monitor the size of the aneurysm. Referral to a surgeon will be considered if the aneurysm grows to 5 cm.    Follow-up  - Follow-up scheduled for the end of summer or beginning of fall.        Services suggested: No services needed at this time  Health Care Screening: Age-appropriate preventive services recommended by USPTF and ACIP covered by Medicare were discussed today. Services ordered if indicated and agreed upon by the patient.  Referrals offered: Community-based lifestyle interventions to reduce health risks and promote self-management and wellness, fall prevention, nutrition, physical activity, tobacco-use cessation, weight loss, and mental health services as per orders if indicated.    Discussion today about general wellness and lifestyle habits:    Prevent falls and reduce trip hazards; Cautioned about securing or removing rugs.  Have a working fire alarm and carbon monoxide detector;   Engage in regular physical activity and social activities     Follow-up: Return in about 4 months (around 9/23/2025), or if symptoms worsen or fail to improve, for Imaging F/U, Lab F/U.         [1]   Current Outpatient Medications   Medication Sig Dispense Refill     GLUCOSAMINE SULFATE PO Take 2 Capsules by mouth every day. Indications: supplement      Cyanocobalamin (VITAMIN B12 PO) Take 1,500 mcg by mouth every day. Indications: supplement      LYSINE HCL PO Take 1 Tablet by mouth every day. [employer]  healthy eyes  Indications: supplement      non-formulary med Take 1 Tablet by mouth every day. [employer] 700    Indications: supplement      ZINC GLYCINATE PO Take 1 Tablet by mouth every day. Zinc plus  Indications: supplement      PHOSPHATIDYLSERINE PO Take 1 Capsule by mouth every day. Indications: memory supplement      Dextran 70-Hypromellose (ARTIFICIAL TEARS) 0.1-0.3 % Solution Administer 1 Drop into affected eye(s) every day. 1 drop each eye  Indications: dry eye      non-formulary med Take 1 Tablet by mouth every day. [employer] Immune support  herbal blend  Indications: supplement      GARLIC PO Take 1 Capsule by mouth every day. 1575 mg  Indications: supplement      magnesium hydroxide (MILK OF MAGNESIA) 400 MG/5ML Suspension Take 30 mL by mouth 1 time a day as needed (constipation). 118 mL 1    Ascorbic Acid (VITAMIN C) 1000 MG Tab Take 1 Tablet by mouth every day. Indications: supplement      Ginkgo Biloba (GINKOBA PO) Take 1 Tablet by mouth every day. 120 mg  Indications: supplement      Cholecalciferol (VITAMIN D-3 PO) Take 1 Tablet by mouth every day. 50 mcg  Indications: supplement      CALCIUM PO Take 1 Tablet by mouth every day. calcium bone maker complex -   Indications: supplement      MAGNESIUM PO Take 1 Tablet by mouth every day. 100 mg  Indications: supplement      Red Yeast Rice Extract (RED YEAST RICE PO) Take 1 Tablet by mouth every day. 600 mg  Indications: heart health      Coenzyme Q10 (COQ-10 PO) Take 1 Tablet by mouth every day. 100 mg Ubiquinone  Indications: supplement       No current facility-administered medications for this visit.   [2]   Social History  Tobacco Use    Smoking status: Former     Current packs/day: 0.00      Average packs/day: 1 pack/day for 10.0 years (10.0 ttl pk-yrs)     Types: Cigarettes     Start date:      Quit date:      Years since quittin.4    Smokeless tobacco: Never   Vaping Use    Vaping status: Never Used   Substance Use Topics    Alcohol use: Yes     Comment: 1 drink a month    Drug use: Never   [3]   Past Surgical History:  Procedure Laterality Date    SHOULDER ARTHROPLASTY TOTAL REVERSED Left 6/10/2024    Procedure: LEFT ARTHROPLASTY, SHOULDER, TOTAL, REVERSE;  Surgeon: Jamison Castillo M.D.;  Location: SURGERY [facility];  Service: Orthopedics    CATARACT EXTRACTION WITH IOL Bilateral     INGUINAL HERNIA REPAIR Left     OTHER ORTHOPEDIC SURGERY      screw in right shoulder    PROSTATE NEEDLE BIOPSY      x4 reports all benign

## 2025-05-28 ENCOUNTER — APPOINTMENT (OUTPATIENT)
Dept: RADIOLOGY | Facility: MEDICAL CENTER | Age: 75
End: 2025-05-28
Attending: EMERGENCY MEDICINE
Payer: MEDICARE

## 2025-05-28 ENCOUNTER — HOSPITAL ENCOUNTER (OUTPATIENT)
Facility: MEDICAL CENTER | Age: 75
End: 2025-05-30
Attending: EMERGENCY MEDICINE | Admitting: HOSPITALIST
Payer: MEDICARE
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
## 2025-05-28 DIAGNOSIS — N17.9 AKI (ACUTE KIDNEY INJURY) (HCC): ICD-10-CM

## 2025-05-28 DIAGNOSIS — R55 SYNCOPE, UNSPECIFIED SYNCOPE TYPE: ICD-10-CM

## 2025-05-28 DIAGNOSIS — N39.0 ACUTE UTI: Primary | ICD-10-CM

## 2025-05-28 DIAGNOSIS — R41.0 CONFUSION: ICD-10-CM

## 2025-05-28 DIAGNOSIS — R53.1 WEAKNESS: ICD-10-CM

## 2025-05-28 DIAGNOSIS — R41.82 ALTERED MENTAL STATUS, UNSPECIFIED ALTERED MENTAL STATUS TYPE: ICD-10-CM

## 2025-05-28 PROBLEM — N30.90 CYSTITIS: Status: ACTIVE | Noted: 2025-05-28

## 2025-05-28 LAB
ALBUMIN SERPL BCP-MCNC: 3.9 G/DL (ref 3.2–4.9)
ALBUMIN/GLOB SERPL: 1.3 G/DL
ALP SERPL-CCNC: 82 U/L (ref 30–99)
ALT SERPL-CCNC: 22 U/L (ref 2–50)
ANION GAP SERPL CALC-SCNC: 13 MMOL/L (ref 7–16)
APPEARANCE UR: CLEAR
AST SERPL-CCNC: 22 U/L (ref 12–45)
BACTERIA #/AREA URNS HPF: ABNORMAL /HPF
BACTERIA BLD CULT: NORMAL
BACTERIA BLD CULT: NORMAL
BASOPHILS # BLD AUTO: 0.4 % (ref 0–1.8)
BASOPHILS # BLD: 0.04 K/UL (ref 0–0.12)
BILIRUB SERPL-MCNC: 2 MG/DL (ref 0.1–1.5)
BILIRUB UR QL STRIP.AUTO: NEGATIVE
BUN SERPL-MCNC: 21 MG/DL (ref 8–22)
CALCIUM ALBUM COR SERPL-MCNC: 9.1 MG/DL (ref 8.5–10.5)
CALCIUM SERPL-MCNC: 9 MG/DL (ref 8.5–10.5)
CASTS URNS QL MICRO: ABNORMAL /LPF (ref 0–2)
CHLORIDE SERPL-SCNC: 105 MMOL/L (ref 96–112)
CK SERPL-CCNC: 110 U/L (ref 0–154)
CO2 SERPL-SCNC: 20 MMOL/L (ref 20–33)
COLOR UR: ABNORMAL
CREAT SERPL-MCNC: 1.48 MG/DL (ref 0.5–1.4)
CRP SERPL HS-MCNC: 9.32 MG/DL (ref 0–0.75)
EKG IMPRESSION: NORMAL
EOSINOPHIL # BLD AUTO: 0 K/UL (ref 0–0.51)
EOSINOPHIL NFR BLD: 0 % (ref 0–6.9)
EPITHELIAL CELLS 1715: ABNORMAL /HPF (ref 0–5)
ERYTHROCYTE [DISTWIDTH] IN BLOOD BY AUTOMATED COUNT: 46.4 FL (ref 35.9–50)
FLUAV RNA SPEC QL NAA+PROBE: NEGATIVE
FLUBV RNA SPEC QL NAA+PROBE: NEGATIVE
GFR SERPLBLD CREATININE-BSD FMLA CKD-EPI: 49 ML/MIN/1.73 M 2
GLOBULIN SER CALC-MCNC: 2.9 G/DL (ref 1.9–3.5)
GLUCOSE SERPL-MCNC: 121 MG/DL (ref 65–99)
GLUCOSE UR STRIP.AUTO-MCNC: NEGATIVE MG/DL
HCT VFR BLD AUTO: 49.1 % (ref 42–52)
HGB BLD-MCNC: 16.9 G/DL (ref 14–18)
IMM GRANULOCYTES # BLD AUTO: 0.05 K/UL (ref 0–0.11)
IMM GRANULOCYTES NFR BLD AUTO: 0.5 % (ref 0–0.9)
KETONES UR STRIP.AUTO-MCNC: ABNORMAL MG/DL
LACTATE SERPL-SCNC: 1.5 MMOL/L (ref 0.5–2)
LEUKOCYTE ESTERASE UR QL STRIP.AUTO: ABNORMAL
LYMPHOCYTES # BLD AUTO: 0.64 K/UL (ref 1–4.8)
LYMPHOCYTES NFR BLD: 6.8 % (ref 22–41)
MAGNESIUM SERPL-MCNC: 2.1 MG/DL (ref 1.5–2.5)
MCH RBC QN AUTO: 31.3 PG (ref 27–33)
MCHC RBC AUTO-ENTMCNC: 34.4 G/DL (ref 32.3–36.5)
MCV RBC AUTO: 90.9 FL (ref 81.4–97.8)
MICRO URNS: ABNORMAL
MONOCYTES # BLD AUTO: 0.76 K/UL (ref 0–0.85)
MONOCYTES NFR BLD AUTO: 8.1 % (ref 0–13.4)
NEUTROPHILS # BLD AUTO: 7.87 K/UL (ref 1.82–7.42)
NEUTROPHILS NFR BLD: 84.2 % (ref 44–72)
NITRITE UR QL STRIP.AUTO: NEGATIVE
NRBC # BLD AUTO: 0 K/UL
NRBC BLD-RTO: 0 /100 WBC (ref 0–0.2)
NT-PROBNP SERPL IA-MCNC: 406 PG/ML (ref 0–125)
PH UR STRIP.AUTO: 6 [PH] (ref 5–8)
PHOSPHATE SERPL-MCNC: 2.9 MG/DL (ref 2.5–4.5)
PLATELET # BLD AUTO: 226 K/UL (ref 164–446)
PMV BLD AUTO: 10.7 FL (ref 9–12.9)
POTASSIUM SERPL-SCNC: 3.6 MMOL/L (ref 3.6–5.5)
PROT SERPL-MCNC: 6.8 G/DL (ref 6–8.2)
PROT UR QL STRIP: 30 MG/DL
RBC # BLD AUTO: 5.4 M/UL (ref 4.7–6.1)
RBC # URNS HPF: >100 /HPF (ref 0–2)
RBC UR QL AUTO: ABNORMAL
RSV RNA SPEC QL NAA+PROBE: NEGATIVE
SARS-COV-2 RNA RESP QL NAA+PROBE: NOTDETECTED
SIGNIFICANT IND 70042: NORMAL
SIGNIFICANT IND 70042: NORMAL
SITE SITE: NORMAL
SITE SITE: NORMAL
SODIUM SERPL-SCNC: 138 MMOL/L (ref 135–145)
SOURCE SOURCE: NORMAL
SOURCE SOURCE: NORMAL
SP GR UR STRIP.AUTO: 1.02
TROPONIN T SERPL-MCNC: 20 NG/L (ref 6–19)
UROBILINOGEN UR STRIP.AUTO-MCNC: 1 EU/DL
WBC # BLD AUTO: 9.4 K/UL (ref 4.8–10.8)
WBC #/AREA URNS HPF: ABNORMAL /HPF

## 2025-05-28 PROCEDURE — 80053 COMPREHEN METABOLIC PANEL: CPT

## 2025-05-28 PROCEDURE — 96375 TX/PRO/DX INJ NEW DRUG ADDON: CPT

## 2025-05-28 PROCEDURE — G0378 HOSPITAL OBSERVATION PER HR: HCPCS

## 2025-05-28 PROCEDURE — 99223 1ST HOSP IP/OBS HIGH 75: CPT | Mod: GC | Performed by: HOSPITALIST

## 2025-05-28 PROCEDURE — 85025 COMPLETE CBC W/AUTO DIFF WBC: CPT

## 2025-05-28 PROCEDURE — 93005 ELECTROCARDIOGRAM TRACING: CPT | Mod: TC

## 2025-05-28 PROCEDURE — 81001 URINALYSIS AUTO W/SCOPE: CPT

## 2025-05-28 PROCEDURE — 83735 ASSAY OF MAGNESIUM: CPT

## 2025-05-28 PROCEDURE — 71045 X-RAY EXAM CHEST 1 VIEW: CPT

## 2025-05-28 PROCEDURE — 87186 SC STD MICRODIL/AGAR DIL: CPT

## 2025-05-28 PROCEDURE — 84100 ASSAY OF PHOSPHORUS: CPT

## 2025-05-28 PROCEDURE — 93005 ELECTROCARDIOGRAM TRACING: CPT | Mod: TC | Performed by: EMERGENCY MEDICINE

## 2025-05-28 PROCEDURE — 86140 C-REACTIVE PROTEIN: CPT

## 2025-05-28 PROCEDURE — 70450 CT HEAD/BRAIN W/O DYE: CPT

## 2025-05-28 PROCEDURE — 84484 ASSAY OF TROPONIN QUANT: CPT

## 2025-05-28 PROCEDURE — 700105 HCHG RX REV CODE 258: Performed by: EMERGENCY MEDICINE

## 2025-05-28 PROCEDURE — 96374 THER/PROPH/DIAG INJ IV PUSH: CPT

## 2025-05-28 PROCEDURE — 99285 EMERGENCY DEPT VISIT HI MDM: CPT

## 2025-05-28 PROCEDURE — 83605 ASSAY OF LACTIC ACID: CPT

## 2025-05-28 PROCEDURE — 87040 BLOOD CULTURE FOR BACTERIA: CPT

## 2025-05-28 PROCEDURE — 36415 COLL VENOUS BLD VENIPUNCTURE: CPT

## 2025-05-28 PROCEDURE — 87086 URINE CULTURE/COLONY COUNT: CPT

## 2025-05-28 PROCEDURE — 83880 ASSAY OF NATRIURETIC PEPTIDE: CPT

## 2025-05-28 PROCEDURE — 0241U HCHG SARS-COV-2 COVID-19 NFCT DS RESP RNA 4 TRGT ED POC: CPT

## 2025-05-28 PROCEDURE — 700111 HCHG RX REV CODE 636 W/ 250 OVERRIDE (IP): Mod: JZ | Performed by: EMERGENCY MEDICINE

## 2025-05-28 PROCEDURE — 82550 ASSAY OF CK (CPK): CPT

## 2025-05-28 PROCEDURE — 87077 CULTURE AEROBIC IDENTIFY: CPT

## 2025-05-28 RX ORDER — HEPARIN SODIUM 5000 [USP'U]/ML
5000 INJECTION, SOLUTION INTRAVENOUS; SUBCUTANEOUS EVERY 8 HOURS
Status: DISCONTINUED | OUTPATIENT
Start: 2025-05-28 | End: 2025-05-30 | Stop reason: HOSPADM

## 2025-05-28 RX ORDER — CEFTRIAXONE 1 G/1
1000 INJECTION, POWDER, FOR SOLUTION INTRAMUSCULAR; INTRAVENOUS ONCE
Status: COMPLETED | OUTPATIENT
Start: 2025-05-28 | End: 2025-05-28

## 2025-05-28 RX ORDER — LABETALOL HYDROCHLORIDE 5 MG/ML
10 INJECTION, SOLUTION INTRAVENOUS EVERY 4 HOURS PRN
Status: DISCONTINUED | OUTPATIENT
Start: 2025-05-28 | End: 2025-05-30 | Stop reason: HOSPADM

## 2025-05-28 RX ORDER — AMOXICILLIN 250 MG
2 CAPSULE ORAL EVERY EVENING
Status: DISCONTINUED | OUTPATIENT
Start: 2025-05-28 | End: 2025-05-30 | Stop reason: HOSPADM

## 2025-05-28 RX ORDER — POLYETHYLENE GLYCOL 3350 17 G/17G
1 POWDER, FOR SOLUTION ORAL
Status: DISCONTINUED | OUTPATIENT
Start: 2025-05-28 | End: 2025-05-30 | Stop reason: HOSPADM

## 2025-05-28 RX ORDER — SODIUM CHLORIDE 9 MG/ML
1000 INJECTION, SOLUTION INTRAVENOUS ONCE
Status: COMPLETED | OUTPATIENT
Start: 2025-05-28 | End: 2025-05-28

## 2025-05-28 RX ORDER — ACETAMINOPHEN 325 MG/1
650 TABLET ORAL EVERY 6 HOURS PRN
Status: DISCONTINUED | OUTPATIENT
Start: 2025-05-28 | End: 2025-05-30 | Stop reason: HOSPADM

## 2025-05-28 RX ADMIN — SODIUM CHLORIDE 1000 ML: 9 INJECTION, SOLUTION INTRAVENOUS at 18:06

## 2025-05-28 RX ADMIN — CEFTRIAXONE SODIUM 1000 MG: 1 INJECTION, POWDER, FOR SOLUTION INTRAMUSCULAR; INTRAVENOUS at 18:59

## 2025-05-28 SDOH — ECONOMIC STABILITY: TRANSPORTATION INSECURITY
IN THE PAST 12 MONTHS, HAS LACK OF RELIABLE TRANSPORTATION KEPT YOU FROM MEDICAL APPOINTMENTS, MEETINGS, WORK OR FROM GETTING THINGS NEEDED FOR DAILY LIVING?: NO

## 2025-05-28 SDOH — ECONOMIC STABILITY: TRANSPORTATION INSECURITY
IN THE PAST 12 MONTHS, HAS THE LACK OF TRANSPORTATION KEPT YOU FROM MEDICAL APPOINTMENTS OR FROM GETTING MEDICATIONS?: NO

## 2025-05-28 ASSESSMENT — SOCIAL DETERMINANTS OF HEALTH (SDOH)
WITHIN THE LAST YEAR, HAVE TO BEEN RAPED OR FORCED TO HAVE ANY KIND OF SEXUAL ACTIVITY BY YOUR PARTNER OR EX-PARTNER?: NO
IN THE PAST 12 MONTHS, HAS THE ELECTRIC, GAS, OIL, OR WATER COMPANY THREATENED TO SHUT OFF SERVICE IN YOUR HOME?: NO
WITHIN THE PAST 12 MONTHS, YOU WORRIED THAT YOUR FOOD WOULD RUN OUT BEFORE YOU GOT THE MONEY TO BUY MORE: NEVER TRUE
WITHIN THE PAST 12 MONTHS, THE FOOD YOU BOUGHT JUST DIDN'T LAST AND YOU DIDN'T HAVE MONEY TO GET MORE: NEVER TRUE
WITHIN THE LAST YEAR, HAVE YOU BEEN HUMILIATED OR EMOTIONALLY ABUSED IN OTHER WAYS BY YOUR PARTNER OR EX-PARTNER?: NO
WITHIN THE LAST YEAR, HAVE YOU BEEN AFRAID OF YOUR PARTNER OR EX-PARTNER?: NO
WITHIN THE LAST YEAR, HAVE YOU BEEN KICKED, HIT, SLAPPED, OR OTHERWISE PHYSICALLY HURT BY YOUR PARTNER OR EX-PARTNER?: NO

## 2025-05-28 ASSESSMENT — LIFESTYLE VARIABLES
TOTAL SCORE: 0
DOES PATIENT WANT TO STOP DRINKING: NO
CONSUMPTION TOTAL: NEGATIVE
EVER HAD A DRINK FIRST THING IN THE MORNING TO STEADY YOUR NERVES TO GET RID OF A HANGOVER: NO
ALCOHOL_USE: NO
HAVE PEOPLE ANNOYED YOU BY CRITICIZING YOUR DRINKING: NO
HOW MANY TIMES IN THE PAST YEAR HAVE YOU HAD 5 OR MORE DRINKS IN A DAY: 0
AVERAGE NUMBER OF DAYS PER WEEK YOU HAVE A DRINK CONTAINING ALCOHOL: 0
TOTAL SCORE: 0
ON A TYPICAL DAY WHEN YOU DRINK ALCOHOL HOW MANY DRINKS DO YOU HAVE: 0
HAVE YOU EVER FELT YOU SHOULD CUT DOWN ON YOUR DRINKING: NO
EVER FELT BAD OR GUILTY ABOUT YOUR DRINKING: NO
TOTAL SCORE: 0

## 2025-05-28 ASSESSMENT — COGNITIVE AND FUNCTIONAL STATUS - GENERAL
DRESSING REGULAR LOWER BODY CLOTHING: A LITTLE
TURNING FROM BACK TO SIDE WHILE IN FLAT BAD: A LITTLE
PERSONAL GROOMING: A LITTLE
STANDING UP FROM CHAIR USING ARMS: A LITTLE
SUGGESTED CMS G CODE MODIFIER DAILY ACTIVITY: CK
MOBILITY SCORE: 18
TOILETING: A LITTLE
HELP NEEDED FOR BATHING: A LITTLE
CLIMB 3 TO 5 STEPS WITH RAILING: A LITTLE
SUGGESTED CMS G CODE MODIFIER MOBILITY: CK
DAILY ACTIVITIY SCORE: 18
EATING MEALS: A LITTLE
MOVING TO AND FROM BED TO CHAIR: A LITTLE
MOVING FROM LYING ON BACK TO SITTING ON SIDE OF FLAT BED: A LITTLE
WALKING IN HOSPITAL ROOM: A LITTLE
DRESSING REGULAR UPPER BODY CLOTHING: A LITTLE

## 2025-05-28 ASSESSMENT — PAIN DESCRIPTION - PAIN TYPE
TYPE: ACUTE PAIN
TYPE: ACUTE PAIN

## 2025-05-28 ASSESSMENT — ENCOUNTER SYMPTOMS
RESPIRATORY NEGATIVE: 1
WEAKNESS: 1
FOCAL WEAKNESS: 0
FLANK PAIN: 0
GASTROINTESTINAL NEGATIVE: 1
MUSCULOSKELETAL NEGATIVE: 1
EYES NEGATIVE: 1
CARDIOVASCULAR NEGATIVE: 1
PSYCHIATRIC NEGATIVE: 1

## 2025-05-28 ASSESSMENT — FIBROSIS 4 INDEX
FIB4 SCORE: 1.58
FIB4 SCORE: 1.58

## 2025-05-28 NOTE — ED TRIAGE NOTES
Chief Complaint   Patient presents with    Fall    Weakness     BIBA for above complaint. PER EMS patient fell last night at sometime. Son came to his house and found him on the group. Son helped him to the bed and was in bed all day. Patient got out of bed this afternoon and fell again. -blood thinner -loc per patient.   EMS placed an IV and gave 1 gram of IV tylenol. Patient denies any pain during triage.   Patient is A+Ox4.   EKG ordered.      by ems

## 2025-05-29 ENCOUNTER — APPOINTMENT (OUTPATIENT)
Dept: CARDIOLOGY | Facility: MEDICAL CENTER | Age: 75
End: 2025-05-29
Payer: MEDICARE

## 2025-05-29 PROBLEM — R55 SYNCOPE: Status: ACTIVE | Noted: 2025-05-29

## 2025-05-29 LAB
ALBUMIN SERPL BCP-MCNC: 3.5 G/DL (ref 3.2–4.9)
ALBUMIN/GLOB SERPL: 1.4 G/DL
ALP SERPL-CCNC: 74 U/L (ref 30–99)
ALT SERPL-CCNC: 22 U/L (ref 2–50)
ANION GAP SERPL CALC-SCNC: 12 MMOL/L (ref 7–16)
AST SERPL-CCNC: 25 U/L (ref 12–45)
BILIRUB SERPL-MCNC: 1.3 MG/DL (ref 0.1–1.5)
BUN SERPL-MCNC: 21 MG/DL (ref 8–22)
CALCIUM ALBUM COR SERPL-MCNC: 8.9 MG/DL (ref 8.5–10.5)
CALCIUM SERPL-MCNC: 8.5 MG/DL (ref 8.5–10.5)
CHLORIDE SERPL-SCNC: 106 MMOL/L (ref 96–112)
CO2 SERPL-SCNC: 20 MMOL/L (ref 20–33)
CREAT SERPL-MCNC: 1.22 MG/DL (ref 0.5–1.4)
ERYTHROCYTE [DISTWIDTH] IN BLOOD BY AUTOMATED COUNT: 47.2 FL (ref 35.9–50)
GFR SERPLBLD CREATININE-BSD FMLA CKD-EPI: 61 ML/MIN/1.73 M 2
GLOBULIN SER CALC-MCNC: 2.5 G/DL (ref 1.9–3.5)
GLUCOSE SERPL-MCNC: 120 MG/DL (ref 65–99)
HCT VFR BLD AUTO: 44.2 % (ref 42–52)
HGB BLD-MCNC: 14.8 G/DL (ref 14–18)
LV EJECT FRACT MOD 2C 99903: 56.32
LV EJECT FRACT MOD 4C 99902: 55.84
LV EJECT FRACT MOD BP 99901: 55.13
MCH RBC QN AUTO: 30.7 PG (ref 27–33)
MCHC RBC AUTO-ENTMCNC: 33.5 G/DL (ref 32.3–36.5)
MCV RBC AUTO: 91.7 FL (ref 81.4–97.8)
PLATELET # BLD AUTO: 180 K/UL (ref 164–446)
PMV BLD AUTO: 10.6 FL (ref 9–12.9)
POTASSIUM SERPL-SCNC: 3.5 MMOL/L (ref 3.6–5.5)
PROT SERPL-MCNC: 6 G/DL (ref 6–8.2)
RBC # BLD AUTO: 4.82 M/UL (ref 4.7–6.1)
SODIUM SERPL-SCNC: 138 MMOL/L (ref 135–145)
VIT B12 SERPL-MCNC: 412 PG/ML (ref 211–911)
WBC # BLD AUTO: 7.1 K/UL (ref 4.8–10.8)

## 2025-05-29 PROCEDURE — 700111 HCHG RX REV CODE 636 W/ 250 OVERRIDE (IP): Mod: JZ

## 2025-05-29 PROCEDURE — 36415 COLL VENOUS BLD VENIPUNCTURE: CPT

## 2025-05-29 PROCEDURE — G0378 HOSPITAL OBSERVATION PER HR: HCPCS

## 2025-05-29 PROCEDURE — 93306 TTE W/DOPPLER COMPLETE: CPT | Mod: 26 | Performed by: STUDENT IN AN ORGANIZED HEALTH CARE EDUCATION/TRAINING PROGRAM

## 2025-05-29 PROCEDURE — 93306 TTE W/DOPPLER COMPLETE: CPT

## 2025-05-29 PROCEDURE — 82607 VITAMIN B-12: CPT

## 2025-05-29 PROCEDURE — 96366 THER/PROPH/DIAG IV INF ADDON: CPT

## 2025-05-29 PROCEDURE — 700105 HCHG RX REV CODE 258: Performed by: HOSPITALIST

## 2025-05-29 PROCEDURE — 97163 PT EVAL HIGH COMPLEX 45 MIN: CPT

## 2025-05-29 PROCEDURE — 85027 COMPLETE CBC AUTOMATED: CPT

## 2025-05-29 PROCEDURE — 96372 THER/PROPH/DIAG INJ SC/IM: CPT | Mod: XU

## 2025-05-29 PROCEDURE — 97535 SELF CARE MNGMENT TRAINING: CPT

## 2025-05-29 PROCEDURE — 96365 THER/PROPH/DIAG IV INF INIT: CPT | Mod: XU

## 2025-05-29 PROCEDURE — A9270 NON-COVERED ITEM OR SERVICE: HCPCS

## 2025-05-29 PROCEDURE — 700105 HCHG RX REV CODE 258

## 2025-05-29 PROCEDURE — 80053 COMPREHEN METABOLIC PANEL: CPT

## 2025-05-29 PROCEDURE — 99232 SBSQ HOSP IP/OBS MODERATE 35: CPT | Mod: FS | Performed by: INTERNAL MEDICINE

## 2025-05-29 PROCEDURE — 700102 HCHG RX REV CODE 250 W/ 637 OVERRIDE(OP)

## 2025-05-29 PROCEDURE — 97166 OT EVAL MOD COMPLEX 45 MIN: CPT

## 2025-05-29 RX ORDER — SODIUM CHLORIDE 9 MG/ML
INJECTION, SOLUTION INTRAVENOUS CONTINUOUS
Status: DISCONTINUED | OUTPATIENT
Start: 2025-05-29 | End: 2025-05-30 | Stop reason: HOSPADM

## 2025-05-29 RX ORDER — POTASSIUM CHLORIDE 1500 MG/1
40 TABLET, EXTENDED RELEASE ORAL ONCE
Status: COMPLETED | OUTPATIENT
Start: 2025-05-29 | End: 2025-05-29

## 2025-05-29 RX ORDER — NITROFURANTOIN 25; 75 MG/1; MG/1
100 CAPSULE ORAL 2 TIMES DAILY WITH MEALS
Status: DISCONTINUED | OUTPATIENT
Start: 2025-05-29 | End: 2025-05-30 | Stop reason: HOSPADM

## 2025-05-29 RX ADMIN — POTASSIUM CHLORIDE 40 MEQ: 1500 TABLET, EXTENDED RELEASE ORAL at 12:30

## 2025-05-29 RX ADMIN — SODIUM CHLORIDE: 9 INJECTION, SOLUTION INTRAVENOUS at 00:23

## 2025-05-29 RX ADMIN — HEPARIN SODIUM 5000 UNITS: 5000 INJECTION, SOLUTION INTRAVENOUS; SUBCUTANEOUS at 20:34

## 2025-05-29 RX ADMIN — NITROFURANTOIN (MONOHYDRATE/MACROCRYSTALS) 100 MG: 25; 75 CAPSULE ORAL at 18:11

## 2025-05-29 RX ADMIN — AMPICILLIN AND SULBACTAM 3 G: 1; 2 INJECTION, POWDER, FOR SOLUTION INTRAMUSCULAR; INTRAVENOUS at 00:22

## 2025-05-29 RX ADMIN — AMPICILLIN AND SULBACTAM 3 G: 1; 2 INJECTION, POWDER, FOR SOLUTION INTRAMUSCULAR; INTRAVENOUS at 05:52

## 2025-05-29 RX ADMIN — DOCUSATE SODIUM 50 MG AND SENNOSIDES 8.6 MG 2 TABLET: 8.6; 5 TABLET, FILM COATED ORAL at 18:11

## 2025-05-29 ASSESSMENT — COGNITIVE AND FUNCTIONAL STATUS - GENERAL
PERSONAL GROOMING: A LITTLE
STANDING UP FROM CHAIR USING ARMS: A LITTLE
MOBILITY SCORE: 18
SUGGESTED CMS G CODE MODIFIER MOBILITY: CK
CLIMB 3 TO 5 STEPS WITH RAILING: A LOT
DRESSING REGULAR UPPER BODY CLOTHING: A LITTLE
SUGGESTED CMS G CODE MODIFIER DAILY ACTIVITY: CK
DRESSING REGULAR LOWER BODY CLOTHING: A LITTLE
TOILETING: A LITTLE
MOVING TO AND FROM BED TO CHAIR: A LITTLE
DAILY ACTIVITIY SCORE: 19
HELP NEEDED FOR BATHING: A LITTLE
MOVING FROM LYING ON BACK TO SITTING ON SIDE OF FLAT BED: A LITTLE
WALKING IN HOSPITAL ROOM: A LITTLE

## 2025-05-29 ASSESSMENT — ENCOUNTER SYMPTOMS
MYALGIAS: 0
HEADACHES: 0
COUGH: 0
ABDOMINAL PAIN: 0
FEVER: 0
BLURRED VISION: 0
SHORTNESS OF BREATH: 0
WEAKNESS: 0
PALPITATIONS: 0
SORE THROAT: 0
DEPRESSION: 0
DIZZINESS: 0
VOMITING: 0
FALLS: 0
CHILLS: 0
DOUBLE VISION: 0
NAUSEA: 0

## 2025-05-29 ASSESSMENT — PAIN DESCRIPTION - PAIN TYPE
TYPE: ACUTE PAIN
TYPE: ACUTE PAIN

## 2025-05-29 ASSESSMENT — GAIT ASSESSMENTS
GAIT LEVEL OF ASSIST: STANDBY ASSIST
DISTANCE (FEET): 200
ASSISTIVE DEVICE: FRONT WHEEL WALKER

## 2025-05-29 ASSESSMENT — ACTIVITIES OF DAILY LIVING (ADL): TOILETING: INDEPENDENT

## 2025-05-29 NOTE — ED NOTES
"Medication history reviewed with patient at bedside.   Med rec is complete  Allergies reviewed.     Patient has not had any outpatient antibiotics in the last 30 days.   Anticoagulants: No  Dispense history available in EPIC: No    Patient states he doses not take any rx medications, just otc supplements.   Patient has not taken any supplements in \"A few days\".      Marco Manjarrez PhT          "

## 2025-05-29 NOTE — ASSESSMENT & PLAN NOTE
Pt doesn't remember how he fell down and says he woke up on the floor.    Echocardiogram showing EF 55%  One episode of SVT on the telemetry

## 2025-05-29 NOTE — PROGRESS NOTES
Call received from monitor room at approximately 1112, Notifying RN Pt having episode of 2.8 sec SVT rate up to 180 . Pt asymptomatic upon awakening for assessment and denies having noticed any alteration from baseline feeling. NPMonae informed.

## 2025-05-29 NOTE — ED PROVIDER NOTES
ED Provider Note    Scribed for Jorge Juarez by Evangelina Arauz. 5/28/2025  5:41 PM    Primary care provider: Armaan Koch D.O.  Means of arrival: EMS  History obtained from: Patient and EMS  History limited by: Altered Mental Status/ Confusion    CHIEF COMPLAINT  Chief Complaint   Patient presents with    Fall    Weakness     EXTERNAL RECORDS REVIEWED  Outpatient Notes The patient was seen for an annual wellness exam on 5/23/25. He was noted at that time to have had 4 to 5 falls this year.     HPI/ROS  LIMITATION TO HISTORY   Select: Altered mental status / Confusion  OUTSIDE HISTORIAN(S):  EMS Provides history of encounter    HPI  Ramesh Manjarrez is a 75 y.o. male who presents to the Emergency Department for weakness onset a couple of weeks ago. The patient reports that he has been feeling increasingly weak for the last few weeks. He describes his weakness as being located all over his body. He confirms that he has had several falls since then. EMS reports that the patient had a fall sometime last night and was found on the ground by his son. His son was able to get the patient off the ground and back into bed. The patient then had another fall later in the day and his son called for EMS. Denies loss of consciousness or head strike. Denies recent illness, cough, vomiting, diarrhea, chest pain, or shortness of breath. He states that he normally walks with a cane. Denies alcohol or drug use. The patient is slow to answer questions and appears slightly confused. There are no known alleviating or exacerbating factors.     REVIEW OF SYSTEMS  As above, all other systems reviewed and are negative.   See HPI for further details.     PAST MEDICAL HISTORY   has a past medical history of Acute encephalopathy (10/15/2023), Acute hypoxic respiratory failure (HCC) (10/15/2023), Acute kidney injury (HCC) (10/15/2023), Anemia, Anesthesia, Arrhythmia, Arthritis, Bilateral atelectasis (8/23/2023), Bowel habit  English changes, BPH with elevated PSA, Cataract, Dental disorder, Enterococcus faecalis UTI (urinary tract infection) (10/16/2023), Heart burn, Heart valve disease, High cholesterol, Hyperbilirubinemia (10/15/2023), Hypertension, Hypokalemia (10/17/2023), Hypophosphatemia (10/15/2023), Indigestion, Pain, Polyneuropathy in other diseases classified elsewhere ([facility]) (05/19/2015), PONV (postoperative nausea and vomiting), Recurrent major depressive disorder, in partial remission ([facility]) (01/01/1960), Senile purpura ([facility]) (10/10/2017), Sepsis ([facility]) (10/15/2023), Tinnitus, bilateral, Trauma (8/22/2023), and Urinary incontinence.  SURGICAL HISTORY   has a past surgical history that includes prostate needle biopsy; inguinal hernia repair (Left); cataract extraction with iol (Bilateral); other orthopedic surgery; and shoulder arthroplasty total reversed (Left, 6/10/2024).  SOCIAL HISTORY  Social History[1]   Social History     Substance and Sexual Activity   Drug Use Never     FAMILY HISTORY  Family History   Problem Relation Age of Onset    Other Mother         Liver disease    Diabetes Father     Heart Attack Father         77     CURRENT MEDICATIONS  Current Outpatient Medications   Medication Instructions    Ascorbic Acid (VITAMIN C) 1000 MG Tab 1 Tablet, DAILY    CALCIUM PO 1 Tablet, DAILY    Cholecalciferol (VITAMIN D-3 PO) 1 Tablet, DAILY    Coenzyme Q10 (COQ-10 PO) 1 Tablet, DAILY    Cyanocobalamin (VITAMIN B12 PO) 1,500 mcg, DAILY    Dextran 70-Hypromellose (ARTIFICIAL TEARS) 0.1-0.3 % Solution 1 Drop, DAILY    GARLIC PO 1 Capsule, DAILY    Ginkgo Biloba (GINKOBA PO) 1 Tablet, DAILY    GLUCOSAMINE SULFATE PO 2 Capsules, DAILY    LYSINE HCL PO 1 Tablet, DAILY    magnesium hydroxide (MILK OF MAGNESIA) 400 MG/5ML Suspension 30 mL, Oral, 1 TIME DAILY PRN    MAGNESIUM PO 1 Tablet, DAILY    non-formulary med 1 Tablet, DAILY    non-formulary med 1 Tablet, DAILY    PHOSPHATIDYLSERINE PO 1 Capsule, DAILY    Red Yeast Rice Extract  (RED YEAST RICE PO) 1 Tablet, DAILY    ZINC GLYCINATE PO 1 Tablet, DAILY      ALLERGIES  Allergies[2]    PHYSICAL EXAM    VITAL SIGNS:   Vitals:    05/28/25 1801 05/28/25 1900 05/28/25 1930 05/28/25 1932   BP: 127/71 134/67 136/87 135/76   Pulse: 81 74 73    Resp: (!) 11 14 (!) 25    Temp:       TempSrc:       SpO2: 93% 92% 93%    Weight:       Height:         Vitals: My interpretation: normotensive, not tachycardic, afebrile, not hypoxic    Reinterpretation of vitals: unchanged     Cardiac Monitor Interpretation: The cardiac monitor revealed normal Sinus Rhythm  as interpreted by me. The cardiac monitor was ordered secondary to the patient's history of weakness and to monitor for dysrhythmia and/or tachycardia.    PE:   Gen: sitting comfortably, speaking clearly, appears in no acute distress. Appears slightly confused.   ENT: Mucous membranes moist, posterior pharynx clear, uvula midline, nares patent bilaterally   Neck: Supple, FROM  Pulmonary: Lungs are clear to auscultation bilaterally. No tachypnea  CV:  RRR, no murmur appreciated, pulses 2+ in both upper and lower extremities  Abdomen: soft, NT/ND; no rebound/guarding  : no CVA or suprapubic tenderness   Neuro: A&Ox4 (person, place, time, situation), speech fluent. Appears slightly confused. Able to move all 4 extremities with no localizing deficits.  Skin: No rash or lesions.  No pallor or jaundice.  No cyanosis.  Warm and dry.     DIAGNOSTIC STUDIES / PROCEDURES    LABS  Results for orders placed or performed during the hospital encounter of 05/28/25   CBC with Differential    Collection Time: 05/28/25  4:50 PM   Result Value Ref Range    WBC 9.4 4.8 - 10.8 K/uL    RBC 5.40 4.70 - 6.10 M/uL    Hemoglobin 16.9 14.0 - 18.0 g/dL    Hematocrit 49.1 42.0 - 52.0 %    MCV 90.9 81.4 - 97.8 fL    MCH 31.3 27.0 - 33.0 pg    MCHC 34.4 32.3 - 36.5 g/dL    RDW 46.4 35.9 - 50.0 fL    Platelet Count 226 164 - 446 K/uL    MPV 10.7 9.0 - 12.9 fL    Neutrophils-Polys 84.20  (H) 44.00 - 72.00 %    Lymphocytes 6.80 (L) 22.00 - 41.00 %    Monocytes 8.10 0.00 - 13.40 %    Eosinophils 0.00 0.00 - 6.90 %    Basophils 0.40 0.00 - 1.80 %    Immature Granulocytes 0.50 0.00 - 0.90 %    Nucleated RBC 0.00 0.00 - 0.20 /100 WBC    Neutrophils (Absolute) 7.87 (H) 1.82 - 7.42 K/uL    Lymphs (Absolute) 0.64 (L) 1.00 - 4.80 K/uL    Monos (Absolute) 0.76 0.00 - 0.85 K/uL    Eos (Absolute) 0.00 0.00 - 0.51 K/uL    Baso (Absolute) 0.04 0.00 - 0.12 K/uL    Immature Granulocytes (abs) 0.05 0.00 - 0.11 K/uL    NRBC (Absolute) 0.00 K/uL   Complete Metabolic Panel    Collection Time: 05/28/25  4:50 PM   Result Value Ref Range    Sodium 138 135 - 145 mmol/L    Potassium 3.6 3.6 - 5.5 mmol/L    Chloride 105 96 - 112 mmol/L    Co2 20 20 - 33 mmol/L    Anion Gap 13.0 7.0 - 16.0    Glucose 121 (H) 65 - 99 mg/dL    Bun 21 8 - 22 mg/dL    Creatinine 1.48 (H) 0.50 - 1.40 mg/dL    Calcium 9.0 8.5 - 10.5 mg/dL    Correct Calcium 9.1 8.5 - 10.5 mg/dL    AST(SGOT) 22 12 - 45 U/L    ALT(SGPT) 22 2 - 50 U/L    Alkaline Phosphatase 82 30 - 99 U/L    Total Bilirubin 2.0 (H) 0.1 - 1.5 mg/dL    Albumin 3.9 3.2 - 4.9 g/dL    Total Protein 6.8 6.0 - 8.2 g/dL    Globulin 2.9 1.9 - 3.5 g/dL    A-G Ratio 1.3 g/dL   Magnesium    Collection Time: 05/28/25  4:50 PM   Result Value Ref Range    Magnesium 2.1 1.5 - 2.5 mg/dL   Phosphorus    Collection Time: 05/28/25  4:50 PM   Result Value Ref Range    Phosphorus 2.9 2.5 - 4.5 mg/dL   Troponin    Collection Time: 05/28/25  4:50 PM   Result Value Ref Range    Troponin T 20 (H) 6 - 19 ng/L   proBrain Natriuretic Peptide, NT    Collection Time: 05/28/25  4:50 PM   Result Value Ref Range    NT-proBNP 406 (H) 0 - 125 pg/mL   CREATINE KINASE    Collection Time: 05/28/25  4:50 PM   Result Value Ref Range    CPK Total 110 0 - 154 U/L   ESTIMATED GFR    Collection Time: 05/28/25  4:50 PM   Result Value Ref Range    GFR (CKD-EPI) 49 (A) >60 mL/min/1.73 m 2   EKG    Collection Time: 05/28/25  5:40  PM   Result Value Ref Range    Report       [facility] Emergency Dept.    Test Date:  2025  Pt Name:    RAYA BLANK              Department: ER  MRN:        [id number]                      Room:  Gender:     Male                         Technician: [id number]  :        [id number]                   Requested By:ER TRIAGE PROTOCOL  Order #:    [id number]                    Reading MD: Jorge Juarez    Measurements  Intervals                                Axis  Rate:       98                           P:          67  ND:         139                          QRS:        -77  QRSD:       124                          T:          8  QT:         376  QTc:        481    Interpretive Statements  Sinus rhythm  Right bundle branch block  Inferior infarct, old  Compared to ECG 06/10/2024 08:09:03  Right bundle-branch block now present  Myocardial infarct finding still present  Electronically Signed On 2025 17:40:38 PDT by Jorge Juarez     Lactic Acid    Collection Time: 25  5:55 PM   Result Value Ref Range    Lactic Acid 1.5 0.5 - 2.0 mmol/L   CRP QUANTITIVE (NON-CARDIAC)    Collection Time: 25  5:59 PM   Result Value Ref Range    Stat C-Reactive Protein 9.32 (H) 0.00 - 0.75 mg/dL   POC CoV-2, FLU A/B, RSV by PCR    Collection Time: 25  6:08 PM   Result Value Ref Range    POC Influenza A RNA, PCR Negative Negative    POC Influenza B RNA, PCR Negative Negative    POC RSV, by PCR Negative Negative    POC SARS-CoV-2, PCR NotDetected NotDetected   Urinalysis    Collection Time: 25  6:09 PM    Specimen: Urine   Result Value Ref Range    Color Dark Yellow     Character Clear     Specific Gravity 1.024 <1.035    Ph 6.0 5.0 - 8.0    Glucose Negative Negative mg/dL    Ketones Trace (A) Negative mg/dL    Protein 30 (A) Negative mg/dL    Bilirubin Negative Negative    Urobilinogen, Urine 1.0 <=1.0 EU/dL    Nitrite Negative Negative    Leukocyte Esterase Moderate (A) Negative     Occult Blood Large (A) Negative    Micro Urine Req Microscopic    URINE MICROSCOPIC (W/UA)    Collection Time: 05/28/25  6:09 PM   Result Value Ref Range    WBC  (A) /hpf    RBC >100 (A) 0 - 2 /hpf    Bacteria Moderate (A) None /hpf    Epithelial Cells 0-2 0 - 5 /hpf    Urine Casts 0-2 0 - 2 /lpf      All labs reviewed by me. Labs were compared to prior labs if they were available. Significant for no leukocytosis, no anemia, normal electrolytes, mild FABIANO, normal liver enzymes, normal bilirubin, magnesium and phosphorus normal, troponin negative, BNP negative, CPK negative, flu COVID and RSV negative, lactic acid normal, CRP elevated, urinalysis with obvious infection treated with ceftriaxone.    RADIOLOGY  I have independently interpreted the diagnostic imaging associated with this visit and am waiting the final reading from the radiologist.   My preliminary interpretation is a follows: No new consolidations appreciated  Radiologist interpretation is as follows:  CT-HEAD W/O   Final Result      No acute abnormalities are noted on CT scan of the head. Findings are consistent with atrophy. Decreased attenuation in the periventricular white matter likely indicates microvascular ischemic disease.                        DX-CHEST-PORTABLE (1 VIEW)   Final Result      No new abnormalities have developed on portable chest radiograph since the prior examination. No new infiltrates or consolidations are identified.        COURSE & MEDICAL DECISION MAKING  Nursing notes, VS, PMSFHx, labs, imaging, EKG reviewed in chart.    Heart Score: Moderate    ED Observation Status? No; Patient does not meet criteria for ED Observation.     Ddx: Flu, COVID, RSV, pneumonia, UTI    MDM: 5:41 PM Ramesh Manjarrez is a 75 y.o. male who presented with presentation by EMS for frequent falls and generalized weakness.  Did not hit his head or lose consciousness.  Lives at home with his son, uses a cane to ambulate.  Has had 2-3 falls  over the last 48 hours.  He also had generalized confusion and altered mental status today reportedly from EMS.  Upon arrival here patient is a poor historian, moving all 4 extremities and no lateralizing symptoms but does have generalized weakness and confusion.  He is oriented to himself and that he is at a hospital but otherwise is slightly confused about details and logistics of being here today.  No focal findings on his physical exam but will continue with the CT head, chest x-ray, blood work, urinalysis and EKG.  EKG thankfully shows sinus rhythm without ischemic changes.  No new findings on chest x-ray per radiology and myself. All labs reviewed by me. Labs were compared to prior labs if they were available. Significant for no leukocytosis, no anemia, normal electrolytes, mild FABIANO, normal liver enzymes, normal bilirubin, magnesium and phosphorus normal, troponin negative, BNP negative, CPK negative, flu COVID and RSV negative, lactic acid normal, CRP elevated, urinalysis with obvious infection treated with ceftriaxone.  I do think patient's UTI likely explains some of his symptoms and he will be admitted for continued IV antibiotics.  CT imaging of the head shows negative.  Patient mated to hospitalist for continued monitoring and treatment.    ADDITIONAL PROBLEM LIST AND DISPOSITION    I have discussed management of the patient with the following physicians and RICKY's: Hospitalist    Discussion of management with other QHP or appropriate source(s): Pharmacy regarding antibiotic selection     Barriers to care at this time, including but not limited to: None.     Decision tools and prescription drugs considered including, but not limited to: Antibiotics ceftriaxone.    FINAL IMPRESSION  1. Acute UTI Acute   2. Weakness Acute   3. Fall, initial encounter Acute   4. FABIANO (acute kidney injury) (HCC) Acute   5. Altered mental status, unspecified altered mental status type Acute   6. Confusion Acute      Evangelina ASCENCIO  "Regla (Scribe), am scribing for, and in the presence of, Jorge Juarez.    Electronically signed by: Evangelina Arauz (Robinson), 2025    I, Jorge Juarez personally performed the services described in this documentation, as scribed by Evangelina Arauz in my presence, and it is both accurate and complete.    The note accurately reflects work and decisions made by me.  Jorge Juarez  2025  6:45 PM         [1]   Social History  Tobacco Use    Smoking status: Former     Current packs/day: 0.00     Average packs/day: 1 pack/day for 10.0 years (10.0 ttl pk-yrs)     Types: Cigarettes     Start date:      Quit date:      Years since quittin.4    Smokeless tobacco: Never   Vaping Use    Vaping status: Never Used   Substance Use Topics    Alcohol use: Yes     Comment: 1 drink a month    Drug use: Never   [2]   Allergies  Allergen Reactions    Aspirin Unspecified     Worsening tinnitus  Other reaction(s): Other (See Comments)  TINNITUS      Atorvastatin Myalgia    Codeine Unspecified     Other reaction(s): Unknown  \"Made me crazy\"     "

## 2025-05-29 NOTE — HOSPITAL COURSE
Ramesh Manjarrez is a 75 y.o. male with a past medical history of hypertension, recurrent UTI, and BPH who presented 5/28/2025 with multiple falls in the last couple of days and generalized weakness.      Patient fell down sometime last night, patient's son came to his house and found him on the floor, helped him to bed, he got out of bed this afternoon and fell again.  No loss of consciousness or head trauma.  He tells me he does not remember how he fell and he just woke up on the floor.  He was not able to give me any history whether there were any warning signs or presyncopal he before he fell down.  Patient states he has been feeling increasingly weak in the last few weeks. Denies recent illness, cough, vomiting, diarrhea, chest pain, or shortness of breath.  Patient tells me that he has been feeling weak since a very long time.  He usually used to use a wheelchair at his home but tells me that his children to get away and now is using a cane.  Patient seems to have baseline dementia but he is able to be conversant with simple questions.      In the emergency department vital signs stable.  Labs significant for creatinine 1.48, troponin 20, .  Urinalysis showing moderate leukocyte esterase with WBC .  Chest x-ray showed no new infiltrates or consolidations identified.  CT head without showing no acute abnormalities.    Patient seen and examined this a.m.  Overall the patient is able to answer the orientation questions but with conversation patient is apparently confused.  Between the nursing staff and my examination patient is endorsing different symptoms with occasionally agreeing with having dysuria and other times stating he does not.  At this time we will hold off on any further imaging and we will treat for urinary tract infection which we will assess if this improves his mentation.

## 2025-05-29 NOTE — CARE PLAN
The patient is Stable - Low risk of patient condition declining or worsening    Shift Goals  Clinical Goals: Pt will get antibiotics per MAR throughout shift.  Patient Goals: Pt wants to sleep.  Family Goals: Family not present.    Progress made toward(s) clinical / shift goals:      Problem: Knowledge Deficit - Standard  Goal: Patient and family/care givers will demonstrate understanding of plan of care, disease process/condition, diagnostic tests and medications  Outcome: Progressing     Problem: Fall Risk  Goal: Patient will remain free from falls  Outcome: Progressing       Pt is alert and oriented x4. Pt is slightly confused and forgetful. Pt is on RA. Pt is voiding, states some pain with urination. Pt denies medications for pain. Pt received IV antibiotics. Bed in lowest and locked position. Bed alarm on. Call light within reach. Hourly rounding in place.

## 2025-05-29 NOTE — H&P
[facility] Internal Medicine History & Physical Note    Date of Service  5/29/2025    [facility] Team: VIKTORIA   Attending: Emanuel Kiser M.d.  Senior Resident: Dr. Mendoza  Intern:  Dr. Gloria   Contact Number: [phone]    Primary Care Physician  Armaan Koch D.O.    Code Status  DNAR/DNI    Chief Complaint  Chief Complaint   Patient presents with    Fall    Weakness       History of Presenting Illness (HPI): Ramesh Manjarrez is a 75 y.o. male who presented 5/28/2025 with multiple falls in the last couple of days and generalized weakness.  Patient fell down sometime last night, patient's son came to his house and found him on the floor, helped him to bed, he got out of bed this afternoon and fell again.  No loss of consciousness or head trauma.  He tells me he does not remember how he fell and he just woke up on the floor.  He was not able to give me any history whether there were any warning signs or presyncopal he before he fell down.  Patient states he has been feeling increasingly weak in the last few weeks. Denies recent illness, cough, vomiting, diarrhea, chest pain, or shortness of breath.  Patient tells me that he has been feeling weak since a very long time.  He usually used to use a wheelchair at his home but tells me that his children to get away and now is using a cane.  Patient seems to have baseline dementia but he is able to be conversant with simple questions.      Urgent care visit on 3/20/2025-  Presented with urinary frequency urgency dysuria and hematuria since Monday.  Patient was given a course of Bactrim to cover for UTI.  Culture from that visit grew Enterococcus.    In the ED:  - CBC mostly WNL.  - CMP BUN 21 creatinine 1.48.  Mag Phos okay.  GFR 49.  - Troponin T 20, , .  Lactic acid 1.5.  CRP 9.32.  - EKG sinus rhythm.  -CT head normal.    - Chest x-ray no new acute cardiopulmonary process.  Mini respiratory panel negative.  - Urinalysis trace ketones, moderate leukocyte  esterase and large occult blood.  WBC 51 -100 and bacteria moderate.    I discussed the plan of care with patient.    Review of Systems  Review of Systems   Constitutional:  Positive for malaise/fatigue.   HENT: Negative.     Eyes: Negative.    Respiratory: Negative.     Cardiovascular: Negative.    Gastrointestinal: Negative.    Genitourinary: Negative.  Negative for flank pain.   Musculoskeletal: Negative.    Skin: Negative.    Neurological:  Positive for weakness. Negative for focal weakness.   Endo/Heme/Allergies: Negative.    Psychiatric/Behavioral: Negative.         Past Medical History   has a past medical history of Acute encephalopathy (10/15/2023), Acute hypoxic respiratory failure (HCC) (10/15/2023), Acute kidney injury ([facility]) (10/15/2023), Anemia, Anesthesia, Arrhythmia, Arthritis, Bilateral atelectasis (8/23/2023), Bowel habit changes, BPH with elevated PSA, Cataract, Dental disorder, Enterococcus faecalis UTI (urinary tract infection) (10/16/2023), Heart burn, Heart valve disease, High cholesterol, Hyperbilirubinemia (10/15/2023), Hypertension, Hypokalemia (10/17/2023), Hypophosphatemia (10/15/2023), Indigestion, Pain, Polyneuropathy in other diseases classified elsewhere ([facility]) (05/19/2015), PONV (postoperative nausea and vomiting), Recurrent major depressive disorder, in partial remission ([facility]) (01/01/1961), Senile purpura ([facility]) (10/10/2017), Sepsis ([facility]) (10/15/2023), Tinnitus, bilateral, Trauma (8/22/2023), and Urinary incontinence.    Surgical History   has a past surgical history that includes prostate needle biopsy; inguinal hernia repair (Left); cataract extraction with iol (Bilateral); other orthopedic surgery; and shoulder arthroplasty total reversed (Left, 6/10/2024).     Family History  family history includes Diabetes in his father; Heart Attack in his father; Other in his mother.   Family history reviewed with patient.     Social History  Does not smoke drink alcohol or use  drugs.    Allergies  Allergies[1]    Medications  Prior to Admission Medications   Prescriptions Last Dose Informant Patient Reported? Taking?   Ascorbic Acid (VITAMIN C) 1000 MG Tab  Patient Yes No   Sig: Take 1 Tablet by mouth every day. Indications: supplement   CALCIUM PO  Patient Yes No   Sig: Take 1 Tablet by mouth every day. calcium bone maker complex -   Indications: supplement   Cholecalciferol (VITAMIN D-3 PO)  Patient Yes No   Sig: Take 1 Tablet by mouth every day. 50 mcg  Indications: supplement   Coenzyme Q10 (COQ-10 PO)  Patient Yes No   Sig: Take 1 Tablet by mouth every day. 100 mg Ubiquinone  Indications: supplement   Cyanocobalamin (VITAMIN B12 PO)   Yes No   Sig: Take 1,500 mcg by mouth every day. Indications: supplement   Dextran 70-Hypromellose (ARTIFICIAL TEARS) 0.1-0.3 % Solution   Yes No   Sig: Administer 1 Drop into affected eye(s) every day. 1 drop each eye  Indications: dry eye   GARLIC PO   Yes No   Sig: Take 1 Capsule by mouth every day. 1575 mg  Indications: supplement   GLUCOSAMINE SULFATE PO   Yes No   Sig: Take 2 Capsules by mouth every day. Indications: supplement   Ginkgo Biloba (GINKOBA PO)  Patient Yes No   Sig: Take 1 Tablet by mouth every day. 120 mg  Indications: supplement   LYSINE HCL PO   Yes No   Sig: Take 1 Tablet by mouth every day. [employer]  healthy eyes  Indications: supplement   MAGNESIUM PO  Patient Yes No   Sig: Take 1 Tablet by mouth every day. 100 mg  Indications: supplement   PHOSPHATIDYLSERINE PO   Yes No   Sig: Take 1 Capsule by mouth every day. Indications: memory supplement   Red Yeast Rice Extract (RED YEAST RICE PO)  Patient Yes No   Sig: Take 1 Tablet by mouth every day. 600 mg  Indications: heart health   ZINC GLYCINATE PO   Yes No   Sig: Take 1 Tablet by mouth every day. Zinc plus  Indications: supplement   magnesium hydroxide (MILK OF MAGNESIA) 400 MG/5ML Suspension   No No   Sig: Take 30 mL by mouth 1 time a day as needed (constipation).    non-formulary med   Yes No   Sig: Take 1 Tablet by mouth every day. [employer] 700    Indications: supplement   non-formulary med   Yes No   Sig: Take 1 Tablet by mouth every day. [employer] Immune support  herbal blend  Indications: supplement      Facility-Administered Medications: None       Physical Exam  Temp:  [36.6 °C (97.9 °F)-37.5 °C (99.5 °F)] 36.6 °C (97.9 °F)  Pulse:  [] 70  Resp:  [11-25] 18  BP: (116-145)/(65-88) 143/88  SpO2:  [91 %-95 %] 95 %  Blood Pressure : 127/71   Temperature: 37.5 °C (99.5 °F)   Pulse: 81   Respiration: (!) 11   Pulse Oximetry: 93 %       Physical Exam  Constitutional:       Appearance: Normal appearance. He is normal weight.   HENT:      Head: Normocephalic.      Nose: Nose normal.      Mouth/Throat:      Mouth: Mucous membranes are moist.   Eyes:      Extraocular Movements: Extraocular movements intact.      Conjunctiva/sclera: Conjunctivae normal.      Pupils: Pupils are equal, round, and reactive to light.   Cardiovascular:      Rate and Rhythm: Normal rate and regular rhythm.      Pulses: Normal pulses.      Heart sounds: Normal heart sounds.   Pulmonary:      Effort: Pulmonary effort is normal.   Abdominal:      General: Abdomen is flat.   Musculoskeletal:         General: Normal range of motion.      Cervical back: Normal range of motion.   Skin:     General: Skin is warm.      Capillary Refill: Capillary refill takes less than 2 seconds.   Neurological:      General: No focal deficit present.      Mental Status: He is alert and oriented to person, place, and time. Mental status is at baseline.   Psychiatric:         Mood and Affect: Mood normal.         Behavior: Behavior normal.         Laboratory:  Recent Labs     05/28/25  1650   WBC 9.4   RBC 5.40   HEMOGLOBIN 16.9   HEMATOCRIT 49.1   MCV 90.9   MCH 31.3   MCHC 34.4   RDW 46.4   PLATELETCT 226   MPV 10.7     Recent Labs     05/28/25  1650   SODIUM 138   POTASSIUM 3.6   CHLORIDE 105   CO2 20   GLUCOSE  121*   BUN 21   CREATININE 1.48*   CALCIUM 9.0     Recent Labs     05/28/25  1650   ALTSGPT 22   ASTSGOT 22   ALKPHOSPHAT 82   TBILIRUBIN 2.0*   GLUCOSE 121*         Recent Labs     05/28/25  1650   NTPROBNP 406*         Recent Labs     05/28/25  1650   TROPONINT 20*       Imaging:  CT-HEAD W/O   Final Result      No acute abnormalities are noted on CT scan of the head. Findings are consistent with atrophy. Decreased attenuation in the periventricular white matter likely indicates microvascular ischemic disease.                        DX-CHEST-PORTABLE (1 VIEW)   Final Result      No new abnormalities have developed on portable chest radiograph since the prior examination. No new infiltrates or consolidations are identified.      EC-ECHOCARDIOGRAM COMPLETE W/O CONT    (Results Pending)       X-Ray:  I have personally reviewed the images and compared with prior images.    Assessment/Plan:  Problem Representation: I anticipate this patient will require at least two midnights for appropriate medical management, necessitating inpatient admission because ground-level fall and acute kidney injury    * Cystitis  Assessment & Plan  Urinalysis trace ketones, moderate leukocyte esterase and large occult blood.  WBC 51 -100 and bacteria moderate.  Patient does not have any symptoms.  No dysuria.  No flank or CVA tenderness.  Previous cultures grew Enterococcus sensitive to Unasyn.  Patient does not have any problems urinating.  No concerns for obstruction.  This could be a causative factor for his weakness.  -Unasyn    Weakness  Assessment & Plan  This could be likely due to urinary tract infection.  But this is not rule out other etiology.  - Tele and ECHO ordered.    - Fall precautions.    Acute kidney injury (HCC)- (present on admission)  Assessment & Plan  Likely prerenal.  He got 1 L NS infusion in the ED.  - Encourage p.o. oral intake.  - CTM.    Syncope  Assessment & Plan  Pt doesn't remember how he fell down and says  "he woke up on the floor.    -Tlemetry.    -ECHO.      Acute encephalopathy- (present on admission)  Assessment & Plan  Could be due to UTI.   -CTM.          VTE prophylaxis: SCDs/TEDs and heparin ppx         [1]   Allergies  Allergen Reactions    Aspirin Unspecified     Worsening tinnitus  Other reaction(s): Other (See Comments)  TINNITUS      Atorvastatin Myalgia    Codeine Unspecified     Other reaction(s): Unknown  \"Made me crazy\"     "

## 2025-05-29 NOTE — PROGRESS NOTES
Monitor summary per monitor tech report:    Sinus Rhythm w/BBB, rate 90-97  Ectopy: pPVCs  .14/.12/.37

## 2025-05-29 NOTE — ED NOTES
Pt straight cathed to obtain urine sample, 2nd set of cultures collected and sent to lab. Pt placed on bed alarm.

## 2025-05-29 NOTE — ED NOTES
Bedside report received from off going RN Rufina, assumed care of patient.  POC discussed with patient. Call light within reach, all needs addressed at this time.       Fall risk interventions in place: Place fall risk sign on patient's door, Give patient urinal if applicable, and Keep floor surfaces clean and dry (all applicable per [place] Fall risk assessment)   Continuous monitoring: Cardiac Leads, Pulse Ox, or Blood Pressure  IVF/IV medications: Infusion per MAR (List Med(s)) NS 1L - finished  Oxygen: Room Air  Bedside sitter: Not Applicable   Isolation: Not Applicable    Patient awake  Hospitalist at bedside

## 2025-05-29 NOTE — PROGRESS NOTES
[facility] Medicine Daily Progress Note    Date of Service  5/29/2025    Chief Complaint  Ramesh Manjarrez is a 75 y.o. male admitted 5/28/2025 with falls and generalized weakness.    Hospital Course  Ramesh Manjarrez is a 75 y.o. male with a past medical history of hypertension, recurrent UTI, and BPH who presented 5/28/2025 with multiple falls in the last couple of days and generalized weakness.      Patient fell down sometime last night, patient's son came to his house and found him on the floor, helped him to bed, he got out of bed this afternoon and fell again.  No loss of consciousness or head trauma.  He tells me he does not remember how he fell and he just woke up on the floor.  He was not able to give me any history whether there were any warning signs or presyncopal he before he fell down.  Patient states he has been feeling increasingly weak in the last few weeks. Denies recent illness, cough, vomiting, diarrhea, chest pain, or shortness of breath.  Patient tells me that he has been feeling weak since a very long time.  He usually used to use a wheelchair at his home but tells me that his children to get away and now is using a cane.  Patient seems to have baseline dementia but he is able to be conversant with simple questions.      In the emergency department vital signs stable.  Labs significant for creatinine 1.48, troponin 20, .  Urinalysis showing moderate leukocyte esterase with WBC .  Chest x-ray showed no new infiltrates or consolidations identified.  CT head without showing no acute abnormalities.    Patient seen and examined this a.m.  Overall the patient is able to answer the orientation questions but with conversation patient is apparently confused.  Between the nursing staff and my examination patient is endorsing different symptoms with occasionally agreeing with having dysuria and other times stating he does not.  At this time we will hold off on any further imaging  and we will treat for urinary tract infection which we will assess if this improves his mentation.    Interval Problem Update  Patient is having ongoing confusion.  - Plan of care: Treat his urinary tract infection with Macrobid.  Continue to monitor mentation.  - Disposition: Patient be transferred as observation status for further management.    I have discussed this patient's plan of care and discharge plan at IDT rounds today with Case Management, Nursing, Nursing leadership, and other members of the IDT team.    Consultants/Specialty  None    Code Status  DNAR/DNI    Disposition  The patient is not medically cleared for discharge to home or a post-acute facility.  Anticipate discharge to: home with close outpatient follow-up    I have placed the appropriate orders for post-discharge needs.    Review of Systems  Review of Systems   Constitutional:  Negative for chills, fever and malaise/fatigue.   HENT:  Negative for congestion and sore throat.    Eyes:  Negative for blurred vision and double vision.   Respiratory:  Negative for cough and shortness of breath.    Cardiovascular:  Negative for chest pain, palpitations and leg swelling.   Gastrointestinal:  Negative for abdominal pain, nausea and vomiting.   Genitourinary:  Positive for dysuria and frequency.   Musculoskeletal:  Negative for falls and myalgias.   Skin:  Negative for itching and rash.   Neurological:  Negative for dizziness, weakness and headaches.   Psychiatric/Behavioral:  Negative for depression.         Physical Exam  Temp:  [36.6 °C (97.9 °F)-37.6 °C (99.6 °F)] 37 °C (98.6 °F)  Pulse:  [] 89  Resp:  [11-25] 16  BP: (116-154)/(65-88) 150/85  SpO2:  [91 %-95 %] 92 %    Physical Exam  Constitutional:       Appearance: Normal appearance.   HENT:      Head: Normocephalic and atraumatic.   Eyes:      Pupils: Pupils are equal, round, and reactive to light.   Cardiovascular:      Rate and Rhythm: Normal rate and regular rhythm.      Pulses:  Normal pulses.      Heart sounds: Normal heart sounds.   Pulmonary:      Effort: Pulmonary effort is normal.      Breath sounds: Normal breath sounds.   Abdominal:      General: Bowel sounds are normal.      Palpations: Abdomen is soft.   Musculoskeletal:         General: Normal range of motion.   Skin:     General: Skin is warm and dry.   Neurological:      Mental Status: He is alert. He is disoriented.   Psychiatric:         Mood and Affect: Mood normal.         Behavior: Behavior normal.         Fluids    Intake/Output Summary (Last 24 hours) at 5/29/2025 1405  Last data filed at 5/29/2025 0734  Gross per 24 hour   Intake 551.15 ml   Output 525 ml   Net 26.15 ml        Laboratory  Recent Labs     05/28/25  1650 05/29/25  0057   WBC 9.4 7.1   RBC 5.40 4.82   HEMOGLOBIN 16.9 14.8   HEMATOCRIT 49.1 44.2   MCV 90.9 91.7   MCH 31.3 30.7   MCHC 34.4 33.5   RDW 46.4 47.2   PLATELETCT 226 180   MPV 10.7 10.6     Recent Labs     05/28/25  1650 05/29/25  0057   SODIUM 138 138   POTASSIUM 3.6 3.5*   CHLORIDE 105 106   CO2 20 20   GLUCOSE 121* 120*   BUN 21 21   CREATININE 1.48* 1.22   CALCIUM 9.0 8.5                   Imaging  EC-ECHOCARDIOGRAM COMPLETE W/O CONT   Final Result      CT-HEAD W/O   Final Result      No acute abnormalities are noted on CT scan of the head. Findings are consistent with atrophy. Decreased attenuation in the periventricular white matter likely indicates microvascular ischemic disease.                        DX-CHEST-PORTABLE (1 VIEW)   Final Result      No new abnormalities have developed on portable chest radiograph since the prior examination. No new infiltrates or consolidations are identified.           Assessment/Plan  * Cystitis  Assessment & Plan  Urinalysis trace ketones, moderate leukocyte esterase and large occult blood.  WBC 51 -100 and bacteria moderate.    Patient has inconsistent story on if he is experiencing any urinary symptoms due to his confusion  Will treat with Macrobid as his  previous urinary cultures have been sensitive to this    Syncope  Assessment & Plan  Pt doesn't remember how he fell down and says he woke up on the floor.    Echocardiogram showing EF 55%  One episode of SVT on the telemetry      Weakness  Assessment & Plan  Differentials include UTI versus progressive depression versus confusion  Physical Therapy ordered  Macrobid initiated    Acute encephalopathy- (present on admission)  Assessment & Plan  UA is positive, but patient has different stories on if he has any urinary symptoms  Will treat due to new onset confusion  Continue to monitor for improvement of mentation    Acute kidney injury (HCC)- (present on admission)  Assessment & Plan  1.48 -> 1.22  Likely prerenal.  He got 1 L NS infusion in the ED.  Improved         VTE prophylaxis:    heparin ppx      I have performed a physical exam and reviewed and updated ROS and Plan today (5/29/2025). In review of yesterday's note (5/28/2025), there are no changes except as documented above.        IJocelynn A.P.R.N. performed a substantiated portion of the service face-to-face with same patient on the same date of service INDEPENDENTLY FROM THE MD ON ASSESSMENT, EXAMINATION, AND DISCUSSION IN PLAN OF CARE FOR 19 MINUTES.  I was personally involved in reviewing and conducting the medical decision making, including the information as described in this note.

## 2025-05-29 NOTE — ASSESSMENT & PLAN NOTE
Differentials include UTI versus progressive depression versus confusion  Physical Therapy ordered  Macrobid initiated

## 2025-05-29 NOTE — PROGRESS NOTES
"Pt to room with transport and belongings.    BP (!) 143/88 Comment: Rn notified   Pulse 70   Temp 36.6 °C (97.9 °F) (Temporal)   Resp 18   Ht 1.676 m (5' 6\")   Wt 86.2 kg (190 lb 0.6 oz)   SpO2 95%   BMI 30.67 kg/m²     Patient reports pain at 0 on a scale of 0-10. Educated patient regarding pharmacologic and non pharmacologic modalities for pain management. Oriented to room call light and smoking policy.  Reviewed plan of care (equipment, sequential compression devices, medications, activity, diet, fall precautions, skin care, and pain) with patient. Welcome packet given and reviewed with patient, all questions answered. Education provided on oral hygiene program.    AA&Ox4, confused and forgetful. Denies CP/SOB.  See 2 RN skin note  Tolerating regular diet. Denies N/V.  + void. - BM. Last BM PTA.  Pt ambulates max assist. Pt has increased weakness and multiple falls at home.  All needs met at this time. Call light within reach. Pt calls appropriately. Bed low and locked, non skid socks in place. Hourly rounding in place.  "

## 2025-05-29 NOTE — ASSESSMENT & PLAN NOTE
UA is positive, but patient has different stories on if he has any urinary symptoms  Will treat due to new onset confusion  Continue to monitor for improvement of mentation

## 2025-05-29 NOTE — PROGRESS NOTES
4 Eyes Skin Assessment Completed by DEEP Johnson and DEEP Gupta.      Head (Occipital):  WDL   Ears (Under Medical Devices): WDL   Nose (Under Medical Devices): WDL   Mouth:  WDL   Neck: WDL   Breast/Chest:  WDL   Shoulder Blades:  WDL   Spine:   WDL   (R) Arm/Elbow/Hand: WDL   (L) Arm/Elbow/Hand: WDL   Abdomen: WDL   Pannus/Groin:  Red and Blanching   Sacrum/Coccyx:   Blanching   (R) Ischial Tuberosity (Sit Bones):  WDL   (L) Ischial Tuberosity (Sit Bones):  WDL   (R) Leg:  WDL   (L) Leg:  WDL   (R) Heel:  WDL   (R) Foot/Toe: WDL   (L) Heel: WDL   (L) Foot/Toe:  WDL       DEVICES IN USE:   Respiratory Devices:  NA, patient on room air  Feeding Devices:  N/A   Lines & BP Monitoring Devices:  Peripheral IV    Orthopedic Devices:  N/A  Miscellaneous Devices:  Telemetry monitor    PROTOCOL INTERVENTIONS:   Standard/Trauma Bed:  Already in place    WOUND PHOTOS:   N/A no wounds identified    WOUND CONSULT:   N/A, no advanced wound care needs identified

## 2025-05-29 NOTE — CARE PLAN
The patient is Watcher - Medium risk of patient condition declining or worsening    Shift Goals  Clinical Goals: Strength, rest,cardiac monitor  Patient Goals: Rest  Family Goals: Improved mobility    Progress made toward(s) clinical / shift goals:    Problem: Knowledge Deficit - Standard  Goal: Patient and family/care givers will demonstrate understanding of plan of care, disease process/condition, diagnostic tests and medications  Description: Target End Date:  1-3 days or as soon as patient condition allowsDocument in Patient Education1.  Patient and family/caregiver oriented to unit, equipment, visitation policy and means for communicating concern2.  Complete/review Learning Assessment3.  Assess knowledge level of disease process/condition, treatment plan, diagnostic tests and medications4.  Explain disease process/condition, treatment plan, diagnostic tests and medications  Outcome: Progressing     Problem: Fall Risk  Goal: Patient will remain free from falls  Description: Target End Date:  Prior to discharge or change in level of careDocument interventions on the [facility] Fall Risk Assessment1.  Assess for fall risk factors2.  Implement fall precautions  Outcome: Progressing     Problem: Skin Integrity  Goal: Skin integrity is maintained or improved  Description: Target End Date:  Prior to discharge or change in level of careDocument interventions on Skin Risk/Deniz flowsheet groups and corresponding LDA1.  Assess and monitor skin integrity, appearance and/or temperature2.  Assess risk factors for impaired skin integrity and/or pressures ulcers3.  Implement precautions to protect skin integrity in collaboration with interdisciplinary team4.  Implement pressure ulcer prevention protocol if at risk for skin breakdown5.  Confirm wound care consult if at risk for skin breakdown6.  Ensure patient use of pressure relieving devices  (Low air loss bed, waffle overlay, heel protectors, ROHO cushion, etc)  Outcome:  Progressing       Patient is not progressing towards the following goals:

## 2025-05-29 NOTE — ASSESSMENT & PLAN NOTE
Urinalysis trace ketones, moderate leukocyte esterase and large occult blood.  WBC 51 -100 and bacteria moderate.    Patient has inconsistent story on if he is experiencing any urinary symptoms due to his confusion  Will treat with Macrobid as his previous urinary cultures have been sensitive to this

## 2025-05-30 ENCOUNTER — PHARMACY VISIT (OUTPATIENT)
Dept: PHARMACY | Facility: MEDICAL CENTER | Age: 75
End: 2025-05-30
Payer: COMMERCIAL

## 2025-05-30 ENCOUNTER — HOME HEALTH ADMISSION (OUTPATIENT)
Dept: HOME HEALTH SERVICES | Facility: HOME HEALTHCARE | Age: 75
End: 2025-05-30
Payer: MEDICARE

## 2025-05-30 VITALS
SYSTOLIC BLOOD PRESSURE: 136 MMHG | HEART RATE: 99 BPM | TEMPERATURE: 97.6 F | OXYGEN SATURATION: 95 % | BODY MASS INDEX: 30.54 KG/M2 | DIASTOLIC BLOOD PRESSURE: 91 MMHG | WEIGHT: 190.04 LBS | HEIGHT: 66 IN | RESPIRATION RATE: 19 BRPM

## 2025-05-30 PROCEDURE — 97535 SELF CARE MNGMENT TRAINING: CPT

## 2025-05-30 PROCEDURE — A9270 NON-COVERED ITEM OR SERVICE: HCPCS

## 2025-05-30 PROCEDURE — 99239 HOSP IP/OBS DSCHRG MGMT >30: CPT | Performed by: INTERNAL MEDICINE

## 2025-05-30 PROCEDURE — 700111 HCHG RX REV CODE 636 W/ 250 OVERRIDE (IP)

## 2025-05-30 PROCEDURE — 96372 THER/PROPH/DIAG INJ SC/IM: CPT

## 2025-05-30 PROCEDURE — 700102 HCHG RX REV CODE 250 W/ 637 OVERRIDE(OP)

## 2025-05-30 PROCEDURE — 97530 THERAPEUTIC ACTIVITIES: CPT

## 2025-05-30 PROCEDURE — A9270 NON-COVERED ITEM OR SERVICE: HCPCS | Performed by: INTERNAL MEDICINE

## 2025-05-30 PROCEDURE — G0378 HOSPITAL OBSERVATION PER HR: HCPCS

## 2025-05-30 PROCEDURE — 700102 HCHG RX REV CODE 250 W/ 637 OVERRIDE(OP): Performed by: INTERNAL MEDICINE

## 2025-05-30 PROCEDURE — RXMED WILLOW AMBULATORY MEDICATION CHARGE: Performed by: INTERNAL MEDICINE

## 2025-05-30 PROCEDURE — 92523 SPEECH SOUND LANG COMPREHEN: CPT

## 2025-05-30 RX ORDER — NITROFURANTOIN 25; 75 MG/1; MG/1
100 CAPSULE ORAL 2 TIMES DAILY WITH MEALS
Qty: 6 CAPSULE | Refills: 0 | Status: ACTIVE | OUTPATIENT
Start: 2025-05-30 | End: 2025-06-03

## 2025-05-30 RX ORDER — AMLODIPINE BESYLATE 5 MG/1
5 TABLET ORAL DAILY
Qty: 100 TABLET | Refills: 0 | Status: SHIPPED | OUTPATIENT
Start: 2025-05-30 | End: 2025-06-23

## 2025-05-30 RX ORDER — AMLODIPINE BESYLATE 5 MG/1
5 TABLET ORAL
Status: DISCONTINUED | OUTPATIENT
Start: 2025-05-30 | End: 2025-05-30 | Stop reason: HOSPADM

## 2025-05-30 RX ADMIN — NITROFURANTOIN (MONOHYDRATE/MACROCRYSTALS) 100 MG: 25; 75 CAPSULE ORAL at 08:35

## 2025-05-30 RX ADMIN — AMLODIPINE BESYLATE 5 MG: 5 TABLET ORAL at 09:51

## 2025-05-30 RX ADMIN — NITROFURANTOIN (MONOHYDRATE/MACROCRYSTALS) 100 MG: 25; 75 CAPSULE ORAL at 16:40

## 2025-05-30 RX ADMIN — DOCUSATE SODIUM 50 MG AND SENNOSIDES 8.6 MG 2 TABLET: 8.6; 5 TABLET, FILM COATED ORAL at 16:40

## 2025-05-30 RX ADMIN — HEPARIN SODIUM 5000 UNITS: 5000 INJECTION, SOLUTION INTRAVENOUS; SUBCUTANEOUS at 04:47

## 2025-05-30 RX ADMIN — HEPARIN SODIUM 5000 UNITS: 5000 INJECTION, SOLUTION INTRAVENOUS; SUBCUTANEOUS at 14:26

## 2025-05-30 ASSESSMENT — GAIT ASSESSMENTS
DEVIATION: BRADYKINETIC;INCREASED BASE OF SUPPORT
ASSISTIVE DEVICE: FRONT WHEEL WALKER
GAIT LEVEL OF ASSIST: STANDBY ASSIST
DISTANCE (FEET): 500

## 2025-05-30 ASSESSMENT — PAIN DESCRIPTION - PAIN TYPE: TYPE: ACUTE PAIN

## 2025-05-30 ASSESSMENT — COGNITIVE AND FUNCTIONAL STATUS - GENERAL
SUGGESTED CMS G CODE MODIFIER MOBILITY: CJ
STANDING UP FROM CHAIR USING ARMS: A LITTLE
MOVING TO AND FROM BED TO CHAIR: A LITTLE
WALKING IN HOSPITAL ROOM: A LITTLE
CLIMB 3 TO 5 STEPS WITH RAILING: A LITTLE
MOBILITY SCORE: 20

## 2025-05-30 NOTE — DISCHARGE PLANNING
Received Choice Form at: 1025  Agency/Facility Name: [facility]  Sent Referral per Choice Form at: 1020

## 2025-05-30 NOTE — PROGRESS NOTES
Patient's IV was removed. Patient dressed for DC. Patient handed all personal belongings. Pt taken to the [facility] with transport.

## 2025-05-30 NOTE — THERAPY
Physical Therapy   Initial Evaluation     Patient Name: Ramesh Manjarrez  Age:  75 y.o., Sex:  male  Medical Record #: [id number]  Today's Date: 5/29/2025     Precautions  Precautions: Fall Risk    Assessment  Pt presents with impaired activity tolerance, dynamic balance, strength and cognition associated with chief complaint of GLF with no recollection of immediate prior events in setting of prior UTIs and AMS. Pt is known to this therapist from hip fx GLF in 10/2023; functionally, pt is stand by assist with FWW but difficult to tell his compliance with its use; he also mentions he furniture walks with hat racks; he is able to perform stairs but given conversation increasing concern for pt to be home alone while son is at work; would recommend cognitive evaluation for analytical/self care abilites of being home alone; will follow.     Plan    Physical Therapy Initial Treatment Plan   Treatment Plan : Equipment, Bed Mobility, Gait Training, Manual Therapy, Neuro Re-Education / Balance, Self Care / Home Evaluation, Stair Training, Therapeutic Activities, Therapeutic Exercise  Treatment Frequency: 5 Times per Week  Duration: Until Therapy Goals Met    DC Equipment Recommendations: None  Discharge Recommendations: Recommend home health for continued physical therapy services       Abridged Subjective/Objective     05/29/25 1450   Prior Living Situation   Prior Services None   Housing / Facility 2 Story Apartment / Condo   Steps Into Home 2   Steps In Home 10   Equipment Owned Front-Wheel Walker;Wheelchair   Lives with - Patient's Self Care Capacity Adult Children   Comments son works full time at [employer] during day and pt is alone; he does not use upstairs but sometimes for exercise; does not recall fall;   Prior Level of Functional Mobility   Bed Mobility Independent   Transfer Status Independent   Ambulation Independent   Ambulation Distance to tolerance   Assistive Devices Used None   Cognition    Cognition /  Consciousness X   Level of Consciousness Alert   Safety Awareness Impaired   New Learning Impaired   Attention Impaired   Comments pleasant and cooperative; reports prior AMS and STM and depression; has no roles in his life currently; reports 911 told him his car was still running in the garage when they got to him   Passive ROM Lower Body   Passive ROM Lower Body WDL   Balance Assessment   Sitting Balance (Static) Good   Sitting Balance (Dynamic) Fair +   Standing Balance (Static) Fair   Standing Balance (Dynamic) Fair -   Weight Shift Sitting Good   Weight Shift Standing Fair   Comments B UE support in sitting/standing; kyphotic; left poor toe clearance   Bed Mobility    Supine to Sit Standby Assist   Sit to Supine Standby Assist   Gait Analysis   Gait Level Of Assist Standby Assist   Assistive Device Front Wheel Walker   Distance (Feet) 200   # of Times Distance was Traveled 1   # of Stairs Climbed 2   Level of Assist with Stairs Standby Assist   Weight Bearing Status full   Vision Deficits Impacting Mobility denies   Comments distance limited by therapist   Functional Mobility   Sit to Stand Standby Assist  (with FWW)   Bed, Chair, Wheelchair Transfer Standby Assist  (with FWW)   Short Term Goals    Short Term Goal # 1 Pt will ambulate x 150ft with FWW and supervision within 6 visits to ensure independent mobilty at home.   Education Group   Role of Physical Therapist Patient Response Patient;Acceptance;Explanation;Demonstration;Action Demonstration;Verbal Demonstration   Use of Assistive Device Patient Response Patient;Acceptance;Demonstration;Explanation;Verbal Demonstration;Action Demonstration   Additional Comments protein/water, getting roles/volunteering

## 2025-05-30 NOTE — THERAPY
"Physical Therapy   Daily Treatment     Patient Name: Ramesh Manjarrez  Age:  75 y.o., Sex:  male  Medical Record #: [id number]  Today's Date: 5/30/2025     Precautions  Precautions: (P) Fall Risk    Assessment    Patient received in bed and agreeable to PT session. Pt able to mobilize grossly with SBA and FWW. Pt at times attempting to mobilize without FWW despite cues requiring CGA as pt was reaching for walls/furniture. Educated pt on importance of use of FWW for increased stability, balance, and safety; pt receptive and agreeable. Pt able to ambulate a total of 500ft with FWW and SBA; pt with steady gait and no overt LOB however required cues to keep FWW within STAR. Anticipate pt will be able to return home with HHPT. Will follow for acute care PT needs.     Plan    Treatment Plan Status: (P) Modify Current Treatment Plan  Type of Treatment: (P) Gait Training, Neuro Re-Education / Balance, Self Care / Home Evaluation, Stair Training, Therapeutic Activities, Therapeutic Exercise  Treatment Frequency: (P) 4 Times per Week  Treatment Duration: (P) Until Therapy Goals Met    DC Equipment Recommendations: (P) None (pt will use walker from home)  Discharge Recommendations: (P) Recommend home health for continued physical therapy services      Subjective    \"It feels good to be out of bed\".      Objective       05/30/25 1121   Precautions   Precautions Fall Risk   Vitals   O2 (LPM) 0   O2 Delivery Device None - Room Air   Vitals Comments VSS,d eclines dizziness   Pain 0 - 10 Group   Location Back   Therapist Pain Assessment Post Activity Pain Same as Prior to Activity;Nurse Notified  (pain not rated; reports it is from \"being in bed for too long\")   Cognition    Cognition / Consciousness X   Level of Consciousness Alert   Safety Awareness Impaired   New Learning Impaired   Attention Impaired   Comments very pleasant and participatory, slightly impaired insight and safety awareness   Active ROM Lower Body    Active " ROM Lower Body  WDL   Strength Lower Body   Lower Body Strength  WDL   Comments functional for mobility, limited endurance   Balance   Sitting Balance (Static) Good   Sitting Balance (Dynamic) Fair +   Standing Balance (Static) Fair   Standing Balance (Dynamic) Fair -   Weight Shift Sitting Fair   Weight Shift Standing Fair   Skilled Intervention Verbal Cuing;Compensatory Strategies   Comments w/FWW   Bed Mobility    Supine to Sit Standby Assist   Sit to Supine Standby Assist   Scooting Standby Assist   Rolling Standby Assist   Skilled Intervention Verbal Cuing   Comments HOB flat   Gait Analysis   Gait Level Of Assist Standby Assist   Assistive Device Front Wheel Walker   Distance (Feet) 500   # of Times Distance was Traveled 1   Deviation Bradykinetic;Increased Base Of Support   Skilled Intervention Verbal Cuing;Compensatory Strategies   Functional Mobility   Sit to Stand Standby Assist   Bed, Chair, Wheelchair Transfer Standby Assist   Mobility bed > bathroom > ambulate > chair   Skilled Intervention Verbal Cuing;Compensatory Strategies   6 Clicks Assessment - How much HELP from from another person do you currently need... (If the patient hasn't done an activity recently, how much help from another person do you think he/she would need if he/she tried?)   Turning from your back to your side while in a flat bed without using bedrails? 4   Moving from lying on your back to sitting on the side of a flat bed without using bedrails? 4   Moving to and from a bed to a chair (including a wheelchair)? 3   Standing up from a chair using your arms (e.g., wheelchair, or bedside chair)? 3   Walking in hospital room? 3   Climbing 3-5 steps with a railing? 3   6 clicks Mobility Score 20   Short Term Goals    Short Term Goal # 1 Pt will ambulate x 500 ft with FWW and supervision within 6 visits to ensure independent mobilty at home.   Short Term Goal # 2 Pt will ascend/descend 2 stairs with SPV in 6 visits in order to safely  access his home   Education Group   Education Provided Role of Physical Therapist;Gait Training;Use of Assistive Device   Role of Physical Therapist Patient Response Patient;Acceptance;Explanation;Verbal Demonstration   Gait Training Patient Response Patient;Acceptance;Explanation;Action Demonstration   Use of Assistive Device Patient Response Patient;Acceptance;Explanation;Action Demonstration   Additional Comments Educated pt on fall prevention strategies and importance of frequent mobility   Physical Therapy Treatment Plan   Physical Therapy Treatment Plan Modify Current Treatment Plan   Treatment Plan  Gait Training;Neuro Re-Education / Balance;Self Care / Home Evaluation;Stair Training;Therapeutic Activities;Therapeutic Exercise   Treatment Frequency 4 Times per Week   Duration Until Therapy Goals Met   Anticipated Discharge Equipment and Recommendations   DC Equipment Recommendations None  (pt will use walker from home)   Discharge Recommendations Recommend home health for continued physical therapy services   Interdisciplinary Plan of Care Collaboration   IDT Collaboration with  Nursing   Patient Position at End of Therapy Seated;Chair Alarm On;Call Light within Reach;Tray Table within Reach;Phone within Reach   Collaboration Comments RN updated   Session Information   Date / Session Number  5/30 - 2 (2/4, 6/4)

## 2025-05-30 NOTE — THERAPY
Occupational Therapy   Initial Evaluation     Patient Name: Ramesh Manjarrez  Age:  75 y.o., Sex:  male  Medical Record #: [id number]  Today's Date: 5/29/2025     Precautions  Precautions: Fall Risk    Assessment    Patient is 75 y.o. male admitted with falls and generalized wekaness. Other pertinent medical history includes HTN, recurrent UTI, and BPH. Pt seen for OT evaluation and treatment. Pt required supv-CGA for functional mobility, toilet transfer, standing handwashing, and donning/doffing socks. Pt lives with his son who can assist as needed when home. Pt current functional performance limited by impaired activity tolerance, generalized weakness, impaired coordination, and impaired balance. Pt educated regarding the role of OT, pathology of bedrest, and home safety/fall prevention strategies. Pt will benefit from skilled OT while admitted to acute care.     Plan    Occupational Therapy Initial Treatment Plan   Treatment Interventions: Self Care / Activities of Daily Living, Adaptive Equipment, Neuro Re-Education / Balance, Therapeutic Exercises, Therapeutic Activity, Family / Caregiver Training  Treatment Frequency: 3 Times per Week  Duration: Until Therapy Goals Met    DC Equipment Recommendations: Tub / Shower Seat  Discharge Recommendations: Recommend home health for continued occupational therapy services      Objective     05/29/25 1337   Prior Living Situation   Prior Services None   Housing / Facility 2 Story Apartment / Condo  (stays on first floor most of the time, has an audio studio on second floor)   Bathroom Set up Walk In Shower;Shower Chair;Grab Bars   Equipment Owned Front-Wheel Walker;Single Point Cane;Wheelchair;Tub / Shower Seat;Grab Bar(s) In Tub / Shower;Grab Bar(s) By Toilet   Lives with - Patient's Self Care Capacity Adult Children   Comments Pt lives with his son who can assist when he is home. Pt's son works full time.   Prior Level of ADL Function   Self Feeding Independent  "  Grooming / Hygiene Independent   Bathing Independent   Dressing Independent   Toileting Independent   Prior Level of IADL Function   Medication Management Independent   Laundry Independent   Kitchen Mobility Independent   Finances Independent   Home Management Independent   Shopping Independent   Prior Level Of Mobility Independent Without Device in Community;Independent Without Device in Home  (furniture walks ususally, was using wheelchair for a while but stopped due to his kids wanting him to)   Driving / Transportation Relatives / Others Provide Transportation   History of Falls   History of Falls Yes   Date of Last Fall   (Pt reported \"10 falls\" in the past three years. Mechanism of falls vary (tripping, posterior LOB, slipping, etc))   Precautions   Precautions Fall Risk   Vitals   Pulse (!) 107  (with activity)   Pulse Oximetry 93 %   O2 Delivery Device None - Room Air   Pain   Pain Scales 0 to 10 Scale    Pain 0 - 10 Group   Therapist Pain Assessment Post Activity Pain Same as Prior to Activity;Nurse Notified  (not rated, agreeable to session)   Cognition    Cognition / Consciousness X   Level of Consciousness Alert   Safety Awareness Impaired   New Learning Impaired   Attention Impaired   Comments Pleasant and cooperative   Passive ROM Upper Body   Passive ROM Upper Body WDL   Active ROM Upper Body   Active ROM Upper Body  WDL   Strength Upper Body   Upper Body Strength  WDL   Sensation Upper Body   Upper Extremity Sensation  WDL   Upper Body Muscle Tone   Upper Body Muscle Tone  WDL   Coordination Upper Body   Coordination X   Comments intermittently tremulous   Balance Assessment   Sitting Balance (Static) Fair   Sitting Balance (Dynamic) Fair   Standing Balance (Static) Fair -   Standing Balance (Dynamic) Fair -   Weight Shift Sitting Fair   Weight Shift Standing Fair   Comments w/ FWW   Bed Mobility    Supine to Sit Standby Assist   Sit to Supine Standby Assist   Scooting Standby Assist   Rolling " Standby Assist   Comments HOB flat   ADL Assessment   Grooming Contact Guard Assist;Standing  (washed hands at sink)   Lower Body Dressing Contact Guard Assist  (don/doff socks)   Toileting Contact Guard Assist  (BM on toilet)   Functional Mobility   Sit to Stand Contact Guard Assist   Bed, Chair, Wheelchair Transfer Contact Guard Assist   Toilet Transfers Contact Guard Assist   Transfer Method Stand Step   Mobility EOB>stand with steps near EOB>EOB>bathroom>bed   Comments w/ FWW   Visual Perception   Visual Perception  Not Tested   Activity Tolerance   Sitting in Chair NT   Sitting Edge of Bed ~5 min   Standing ~5 min   Comments limited by weakness, fatigue   Patient / Family Goals   Patient / Family Goal #1 to go home   Short Term Goals   Short Term Goal # 1 Pt will perform ADL transfer w/ supv   Short Term Goal # 2 Pt will perform standing g/h w/ supv   Short Term Goal # 3 Pt will perform toilet hygiene w/ supv   Education Group   Education Provided Role of Occupational Therapist;Activities of Daily Living;Home Safety   Role of Occupational Therapist Patient Response Patient;Acceptance;Explanation;Verbal Demonstration   Home Safety Patient Response Patient;Acceptance;Explanation;Verbal Demonstration  (fall prevention strategies)   ADL Patient Response Patient;Acceptance;Explanation;Demonstration;Action Demonstration;Verbal Demonstration   Occupational Therapy Initial Treatment Plan    Treatment Interventions Self Care / Activities of Daily Living;Adaptive Equipment;Neuro Re-Education / Balance;Therapeutic Exercises;Therapeutic Activity;Family / Caregiver Training   Treatment Frequency 3 Times per Week   Duration Until Therapy Goals Met   Problem List   Problem List Decreased Active Daily Living Skills;Decreased Homemaking Skills;Decreased Functional Mobility;Decreased Activity Tolerance;Impaired Postural Control / Balance;Safety Awareness Deficits / Cognition;Impaired Cognitive Function

## 2025-05-30 NOTE — DISCHARGE INSTRUCTIONS
Discharge Instructions per Ashok Cantu M.D.    Your workup included lab work that showed kidney dysfunction that improved with hydration.  Lab testing for muscle inflammation was negative.  Urine was concerning for infection and you were started on antibiotic and will discharge with nitrofurantoin to complete.  He had a CT head scan that was negative for any bleeding or lesions.  You had a echocardiogram or ultrasound of your heart that was negative for heart failure.  You did have some aortic insufficiency and widening aorta measuring 4 cm, your PCP will need to continue to monitor this. You do have high blood pressure noted in the hospital and I started you on amlodipine for this. Please also see your doctor for blood pressure management.  You are ordered for home health nursing and physical and Occupational Therapy to work with you at home.

## 2025-05-30 NOTE — CARE PLAN
"The patient is Stable - Low risk of patient condition declining or worsening    Shift Goals  Clinical Goals: maintain skin integrity and remain free from GLF  Patient Goals: rest  Family Goals: jessenia    Progress made toward(s) clinical / shift goals:  pt is A&Ox4 currently on RA. Patient has mepilex pads on elbows and heals. Wedges are in place and pt has SCDs on. However pt requested to remove SCDs while sleeping stating \"they don't let him sleep\". Patient has a urinal at bed side and has remained moist free through out my shift. Patients call light is within reach, bed/strip alarm are on. Patient has remained free from GLF.    Patient is not progressing towards the following goals:      "

## 2025-05-30 NOTE — CARE PLAN
Pt AO x 4.  Pt denies pain during initial assessment. Bed alarm on. Call light and belongings within reach.  Bed locked and in lowest position.  Hourly rounding.  Needs currently met.             The patient is Stable - Low risk of patient condition declining or worsening    Shift Goals  Clinical Goals: maintain skin integrity and remain free from GLF  Patient Goals: rest  Family Goals: jessenia    Progress made toward(s) clinical / shift goals:      Problem: Knowledge Deficit - Standard  Goal: Patient and family/care givers will demonstrate understanding of plan of care, disease process/condition, diagnostic tests and medications  Outcome: Progressing     Problem: Fall Risk  Goal: Patient will remain free from falls  Outcome: Progressing     Problem: Skin Integrity  Goal: Skin integrity is maintained or improved  Outcome: Progressing       Patient is not progressing towards the following goals:

## 2025-05-30 NOTE — DISCHARGE PLANNING
Case Management Discharge Planning    Admission Date: 5/28/2025  GMLOS:    ALOS: 0    6-Clicks ADL Score: 19  6-Clicks Mobility Score: 18      Anticipated Discharge Dispo:      DME Needed: No    Action(s) Taken: Choice obtained    Escalations Completed: Social Work    Medically Clear: Yes    Next Steps: LSW met with Pt to discuss HH referral. Pt initially didn't want HH. He believes that working with them in his home will be less effective. He wants to join [employer] and exercise there in their pools. LSW discussed the need to a physical therapist for a short time after he leaves the hospital and then joining [employer] in the future. Pt agreed and signed Choice of HH providers contracted with Pt's insurance. LSW offered to call Pt's son and update him but Pt asked that LSW not call as he is upset with his son.    Barriers to Discharge: Transportation    Is the patient up for discharge tomorrow: No

## 2025-05-30 NOTE — PROGRESS NOTES
4 Eyes Skin Assessment Completed by DEEP Benton and DEEP Sgae.    Skin assessment is primarily focused on high risk bony prominences. Pay special attention to skin beneath and around medical devices, high risk bony prominences, skin to skin areas and areas where the patient lacks sensation to feel pain and areas where the patient previously had breakdown.     Head (Occipital):  WDL   Ears (Under Medical Devices): WDL   Nose (Under Medical Devices): WDL   Mouth:  WDL   Neck: WDL   Breast/Chest:  WDL   Shoulder Blades:  WDL   Spine:   Red and Blanching   (R) Arm/Elbow/Hand: Pink and Blanching   (L) Arm/Elbow/Hand: Pink and Blanching   Abdomen: WDL   Pannus/Groin:  WDL   Sacrum/Coccyx:   Red and Blanching   (R) Ischial Tuberosity (Sit Bones):  WDL   (L) Ischial Tuberosity (Sit Bones):  WDL   (R) Leg:  WDL   (L) Leg:  WDL   (R) Heel:  Pink and Blanching   (R) Foot/Toe: WDL   (L) Heel: Pink and Blanching   (L) Foot/Toe:  WDL     Right elbow      Left elbow      heels      sacrum      DEVICES IN USE:   Respiratory Devices:  NA, patient on room air  Feeding Devices:  N/A   Lines & BP Monitoring Devices:  Peripheral IV, BP cuff, and Pulse ox    Orthopedic Devices:  N/A  Miscellaneous Devices:  SCDs    PROTOCOL INTERVENTIONS:   Standard/Trauma Bed:  Already in place  Elbows Padded with Offloading Dressing:  Already in place  Offloading Dressing - Heel:  Already in place  Q2 Turns with Wedges:  Already in place  Barrier Paste:  Already in place    WOUND PHOTOS:   Completed and in EPIC     WOUND CONSULT:   N/A, no advanced wound care needs identified

## 2025-05-30 NOTE — THERAPY
"Speech Language Pathology   Cognitive Evaluation     Patient Name: Ramesh Manjarrez  AGE:  75 y.o., SEX:  male  Medical Record #: [id number]  Date of Service: 5/30/2025      History of Present Illness  75 y.o. male admitted with falls and generalized wekaness.     PMHx HTN, recurrent UTI, BPH    No previous h/o SLP services at [facility]    CMHx Cystitis, Syncope, Weakness, Acute encephalopathy, FABIANO    CT-Head w/o 5/28/25  No acute abnormalities are noted on CT scan of the head. Findings are consistent with atrophy. Decreased attenuation in the periventricular white matter likely indicates microvascular ischemic disease.      General Information  Vitals  O2 Delivery Device: None - Room Air  Patient Behaviors: Forgetful  Orientation: Oriented x 4  Follows Directives: Yes      Prior Living Situation & Level of Function  Housing / Facility: 2 Story Apartment / Condo (condo)  Lives with - Patient's Self Care Capacity: Adult Children  Comments: Pt's son and girlfrient live with him.     Communication: Patient reports baseline memory deficits stating, \"I forgot sometime...I think it's because I'm old.\"  Swallowing: WFL       Subjective  RN cleared patient for evaluation. Patient received awake, sitting UIB, agreeable to particiapte in evaluation. 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 Patient also endorsed some depression also stating, \"I am lonely.\"        Assessment  The patient was seen this date for a cognitive evaluation. Portions of the COGNISTAT (Neurobehavioral Cognitive Status Assessment) were administered in conjunction with non-standardized assessments. Results are outlined below:      Cognistat  Orientation: Average  Attention: Average  Comprehension: Average  Repetition: Average  Naming: Average  Memory: Moderate  Judgement: Average        Medication Management: Given a mock medication management task patient needing min cues to " provide dosage, mod cues to provide frequency.    Comments: Immediate recall of four word memory task achieved on second attempt. With delayed recall of ~5 minutes, patient recalled 1/4 words independently; provided category prompt, accuracy increased to 2/4; provided three word list, accuracy  increased to; 3/4 words recalled.           Clinical Impressions  Patient presents with moderate cognitive impairment in memory. Informal measures reveal difficulty with executive functioning. Anticipate patient unable to complete IADLs independently and will need intermittent supervision at discharge. Do not suspect acute impairment given head imaging negative and patient reported baseline memory deficits, thus will not follow in acute care.  Patient may benefit from home health SLP follow up to address functional deficits in home setting. Patient also demonstrated interest in independent living facility vs assisted living facility.          NOTE: It is not within the scope of practice of Speech-Language Pathologists to determine patient capacity. Please defer to the physician or psych to complete this assessment.       Recommendations  Supervision Needs Upons Discharge: Intermittent supervision throughout the day and direct assistance with IADLs (see below)  IADLs: Medication management, Financial management, Appointment management, Household chores, Cooking         SLP Treatment Plan  Treatment Plan: None Indicated  SLP Frequency: N/A - Evaluation Only  Estimated Duration: N/A - Evaluation Only      Anticipated Discharge Needs  Discharge Recommendations: Anticipate that the patient will have no further speech therapy needs after discharge from the hospital  Therapy Recommendations Upon DC: Other (See Comments) (Resources re: ILF vs longterm)                Yolanda Medina, SLP

## 2025-05-30 NOTE — DISCHARGE SUMMARY
Discharge Summary    CHIEF COMPLAINT ON ADMISSION  Chief Complaint   Patient presents with    Fall    Weakness       Reason for Admission  Fall     Admission Date  2025    CODE STATUS  DNAR/DNI    HPI & HOSPITAL COURSE  76 yo man with HTN, HLD, depression, BPH who was brought in after the son found him on the floor.  Patient said he had tripped a week ago and felt weaker but still drove to a pedicure appointment.  He says he thinks he slid out of bed and was too weak to get up.   Son reports concerns that since moving here patient has been rarely out of the house, using a wheelchair in the home and using a urinal at bedside.  Patient has also left his car running in the garage for an unknown period of time and has left food burning on the stove.  Patient reports that his dog of 15 years  a few weeks ago and that he is still depressed and grieving from that.    CT head was negative for acute findings, did show atrophy.  CPK was normal.  Chest x-ray was negative for pneumonia.  He was found and treated for Enterococcus UTI.  He also had FABIANO from dehydration and improved with IV fluids.  TTE showed EF 55%, grade 1 diastolic dysfunction, moderate AI, ascending aorta 4 cm.  He was noted to have -150s persistently and started on amlodipine. He was able to work with PT OT who recommended home health.  He had a cognitive evaluation that showed issues with short-term memory and executive function and speech therapist recommended intermittent supervision throughout the day and assistance with medication management, financial management, appointments, household chores and cooking.  I had a conversation with the patient and his son at bedside.  We discussed starting amlodipine and following up outpatient for his aortic dilation.  I discussed my concerns for his deconditioning and using a urinal and a wheelchair when he does not needed which will only further his deconditioning.  Also lowering his bed at home.  Patient says he would like to take on more hobbies and get some exercise by starting with walks and going to the [facility] center for swimming or bicycling with his son. I recommended he hold off on driving for now.  He agrees to work with home health PT OT and to follow-up with his PCP to discuss depression.  He says he has been on antidepressant before and did not like it, asked him to reconsider and also consider therapy.    Therefore, he is discharged in good and stable condition to home with organized home healthcare and close outpatient follow-up.      Discharge Date  5/30/25    FOLLOW UP ITEMS POST DISCHARGE  Started on amlodipine for HTN  Ascending aorta 4 cm  Short-term memory loss on cognitive evaluation, deconditioning -Home health ordered  Depressive symptoms, consider antidepressant, therapy    DISCHARGE DIAGNOSES  Principal Problem:    Cystitis (POA: Unknown)  Active Problems:    Acute kidney injury (HCC) (POA: Yes)    Acute encephalopathy (POA: Yes)    Weakness (POA: Unknown)    Syncope (POA: Unknown)  Resolved Problems:    * No resolved hospital problems. *      FOLLOW UP  Future Appointments   Date Time Provider Department Center   9/25/2025 10:20 AM Armaan Koch D.O. [facility]  [address]  [facility] [address]  [phone]        Armaan Koch D.O.  [address]  [facility] [address]  [phone]    Call in 1 week(s)        MEDICATIONS ON DISCHARGE     Medication List        START taking these medications        Instructions   amLODIPine 5 MG Tabs  Commonly known as: Norvasc   Take 1 Tablet by mouth every day.  Dose: 5 mg     nitrofurantoin 100 MG Caps  Commonly known as: Macrobid   Take 1 Capsule by mouth 2 times a day with meals for 3 days.  Dose: 100 mg            CONTINUE taking these medications        Instructions   Artificial Tears 0.1-0.3 % Soln  Generic drug: Dextran 70-Hypromellose   Administer 1 Drop into affected eye(s)  every day. 1 drop each eye  Indications: dry eye  Dose: 1 Drop     CALCIUM PO   Take 1 Tablet by mouth every day. calcium bone maker complex -   Indications: supplement  Dose: 1 Tablet     COQ-10 PO   Take 1 Tablet by mouth every day. 100 mg Ubiquinone  Indications: supplement  Dose: 1 Tablet     GARLIC PO   Take 1 Capsule by mouth every day. 1575 mg  Indications: supplement  Dose: 1 Capsule     GINKOBA PO   Take 1 Tablet by mouth every day. 120 mg  Indications: supplement  Dose: 1 Tablet     GLUCOSAMINE SULFATE PO   Take 2 Capsules by mouth every day. Indications: supplement  Dose: 2 Capsule     LYSINE HCL PO   Take 1 Tablet by mouth every day. Premium Nutrition  healthy eyes  Indications: supplement  Dose: 1 Tablet     MAGNESIUM PO   Take 1 Tablet by mouth every day. 100 mg  Indications: supplement  Dose: 1 Tablet     RED YEAST RICE PO   Take 1 Tablet by mouth every day. 600 mg  Indications: heart health  Dose: 1 Tablet     VITAMIN B12 PO   Take 1,500 mcg by mouth every day. Indications: supplement  Dose: 1,500 mcg     Vitamin C 1000 MG Tabs   Take 1 Tablet by mouth every day. Indications: supplement  Dose: 1 Tablet     VITAMIN D-3 PO   Take 1 Tablet by mouth every day. 50 mcg  Indications: supplement  Dose: 1 Tablet     ZINC GLYCINATE PO   Take 1 Tablet by mouth every day. Zinc plus  Indications: supplement  Dose: 1 Tablet              Allergies  Allergies[1]    DIET  Orders Placed This Encounter   Procedures    Diet Order Diet: Regular     Standing Status:   Standing     Number of Occurrences:   1     Diet::   Regular [1]       ACTIVITY  As tolerated.    CONSULTATIONS  none    PROCEDURES  EC-ECHOCARDIOGRAM COMPLETE W/O CONT   Final Result      CT-HEAD W/O   Final Result      No acute abnormalities are noted on CT scan of the head. Findings are consistent with atrophy. Decreased attenuation in the periventricular white matter likely indicates microvascular ischemic disease.                       "  DX-CHEST-PORTABLE (1 VIEW)   Final Result      No new abnormalities have developed on portable chest radiograph since the prior examination. No new infiltrates or consolidations are identified.            LABORATORY  Lab Results   Component Value Date    SODIUM 138 05/29/2025    POTASSIUM 3.5 (L) 05/29/2025    CHLORIDE 106 05/29/2025    CO2 20 05/29/2025    GLUCOSE 120 (H) 05/29/2025    BUN 21 05/29/2025    CREATININE 1.22 05/29/2025        Lab Results   Component Value Date    WBC 7.1 05/29/2025    HEMOGLOBIN 14.8 05/29/2025    HEMATOCRIT 44.2 05/29/2025    PLATELETCT 180 05/29/2025        Total time of the discharge process exceeds 40 minutes.       [1]   Allergies  Allergen Reactions    Aspirin Unspecified     Worsening tinnitus  Other reaction(s): Other (See Comments)  TINNITUS      Atorvastatin Myalgia    Codeine Unspecified     Other reaction(s): Unknown  \"Made me crazy\"     "

## 2025-05-30 NOTE — DISCHARGE PLANNING
ATTN: Case Management  RE: Referral for Home Health    As of 5/30/25, we have accepted the Home Health referral for the patient listed above.    [facility]  will contact the patient within 48 hours. If you have any questions or concerns regarding the patient’s transition to Home Health, please do not hesitate to contact us at [phone].      We look forward to collaborating with you,  [facility] Team

## 2025-06-01 ENCOUNTER — TELEPHONE (OUTPATIENT)
Dept: HOME HEALTH SERVICES | Facility: HOME HEALTHCARE | Age: 75
End: 2025-06-01
Payer: MEDICARE

## 2025-06-01 NOTE — TELEPHONE ENCOUNTER
This TCS called and spoke with patient regarding their Home Health referral.  I communicated that we do have their referral and are currently waiting for an open time slot to get their admission visit scheduled.  I asked if they were okay waiting to be called back within the next few days or in the event that a spot opens up sooner, or if they wanted their referral sent to a different Home Health agency.  They voiced their understanding of the delay and asked that their referral stay with [facility] at this time.  I requested a later start of care date for 6/1 per their request.

## 2025-06-02 ENCOUNTER — PATIENT OUTREACH (OUTPATIENT)
Dept: MEDICAL GROUP | Facility: MEDICAL CENTER | Age: 75
End: 2025-06-02
Payer: MEDICARE

## 2025-06-02 ENCOUNTER — TELEPHONE (OUTPATIENT)
Dept: HOME HEALTH SERVICES | Facility: HOME HEALTHCARE | Age: 75
End: 2025-06-02
Payer: MEDICARE

## 2025-06-02 NOTE — TELEPHONE ENCOUNTER
Home Transitional Care Management (TCM) Initial Contact Within 48 Hours of Discharge From Inpatient Setting:    This RN verfied the patient has [facility] ([facility]) and [facility] ([facility]).    This RN contacted patient Ramesh via telephone.   This RN introduced self and provided a brief discription of [facility] TCM program and explained what services Home TCM provides.  Ramesh is not homebound  Ramesh will be traveling on June 12 and therefore does not qualify for Home TCM or [facility].     Do you have a follow up appointment scheduled with your PCP and or specialists: yes  Appointment date(s): 9/25/2025  Are you able to get to your appointment(s): yes  Has [facility] contacted you: yes       Current or previous attempts completed within 2 business days of discharge: 1

## 2025-06-03 NOTE — PROGRESS NOTES
"Transitional Care Management  TCM Outreach Date and Time: Filed (6/2/2025 11:14 AM)    Discharge Questions  Actual Discharge Date: 05/30/25  Now that you are home, how are you feeling?: Good (has been exercising, feeling strong, walking better)  Did you receive any new prescriptions?: Yes (amlodipine, nitrofurantoin) Pt did not want to review med list, became irritable and frustrated.  Did you have any durable medical equipment ordered?: No  Do you have a follow up appointment scheduled with your PCP?: No  Was an appointment scheduled for the patient?: No (pt was irritable, declining appointment. States \"I don't want to go anywhere. I will just strengthen my own body\". Discussed reasons to f/u with PCP. Pt very resistant.)  Reason not scheduled?: Declines  If Home Health was ordered, have they contacted you (Patient): Yes ([facility]  [phone])  Did you have enough support after your last discharge?: Yes  Does this patient qualify for the CCM program?: Yes (referred to Rosanna Aragon RN)    Transitional Care  Number of attempts made to contact patient: 2  Current or previous attempts completed within two business days of discharge? : Yes  Provided education regarding treatment plan, medications, self-management, ADLs?: Yes (ED precautions)  Has patient completed an Advanced Directive?: No  Has the Care Manager's phone number provided?: Yes  Is there anything else I can help you with?: No    Discharge Summary  Chief Complaint: Fall    Weakness  Admitting Diagnosis: Fall  Discharge Diagnosis: Cystitis        "

## 2025-06-10 ENCOUNTER — PATIENT OUTREACH (OUTPATIENT)
Dept: HEALTH INFORMATION MANAGEMENT | Facility: OTHER | Age: 75
End: 2025-06-10
Payer: MEDICARE

## 2025-06-10 DIAGNOSIS — I12.9 HYPERTENSIVE KIDNEY DISEASE WITH CHRONIC KIDNEY DISEASE STAGE II: ICD-10-CM

## 2025-06-10 DIAGNOSIS — N18.2 HYPERTENSIVE KIDNEY DISEASE WITH CHRONIC KIDNEY DISEASE STAGE II: ICD-10-CM

## 2025-06-10 DIAGNOSIS — E78.2 MIXED HYPERLIPIDEMIA: Primary | ICD-10-CM

## 2025-06-23 ENCOUNTER — OFFICE VISIT (OUTPATIENT)
Dept: MEDICAL GROUP | Facility: MEDICAL CENTER | Age: 75
End: 2025-06-23
Payer: MEDICARE

## 2025-06-23 VITALS
RESPIRATION RATE: 16 BRPM | BODY MASS INDEX: 31.18 KG/M2 | SYSTOLIC BLOOD PRESSURE: 132 MMHG | HEIGHT: 66 IN | OXYGEN SATURATION: 95 % | HEART RATE: 78 BPM | WEIGHT: 194 LBS | TEMPERATURE: 99.3 F | DIASTOLIC BLOOD PRESSURE: 80 MMHG

## 2025-06-23 DIAGNOSIS — I71.21 ANEURYSM OF ASCENDING AORTA WITHOUT RUPTURE (HCC): ICD-10-CM

## 2025-06-23 DIAGNOSIS — N17.9 ACUTE KIDNEY INJURY (HCC): ICD-10-CM

## 2025-06-23 DIAGNOSIS — Z09 HOSPITAL DISCHARGE FOLLOW-UP: Primary | ICD-10-CM
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
## 2025-06-23 ASSESSMENT — FIBROSIS 4 INDEX: FIB4 SCORE: 2.25

## 2025-06-23 NOTE — PROGRESS NOTES
Subjective:     Ramesh Manjarrez is a 75 y.o. male who presents for Hospital Follow-up.    HPI:   Recently hospitalized for fall, UTI, FABIANO    History of Present Illness  The patient presents for a follow-up visit after a recent hospitalization.    Hospitalization and Fall  He was hospitalized from 05/28/2025 to 05/30/2025 due to a fall. He reports an improvement in leg strength, allowing him to rise without assistance. He engages in exercises during his showers, utilizing handrails for support, and finds walking beneficial. He plans to resume his routine of cycling at the [employer] and intends to return to swimming at the local pool, having recently purchased a LifeVest for safety as he does not know how to swim.  - Onset: Hospitalized from 05/28/2025 to 05/30/2025 due to a fall.  - Location: Leg strength improvement.  - Duration: Since hospitalization.  - Character: Improvement in leg strength, engaging in exercises, walking beneficial.  - Alleviating/Aggravating Factors: Exercises during showers, utilizing handrails, walking, plans to resume cycling and swimming.  - Severity: No fainting episodes or falls since condition improved.    Additional Information  He reports no urinary symptoms and describes his urine as bright yellow. He maintains a healthy diet, focusing on protein intake, and attempts to consume four 12-ounce bottles of water daily. He has made modifications to his home environment, including replacing his bed and rug, to prevent further falls. He continues to drive, ensuring his garage door is securely closed and locked. He uses a cane for mobility and feels stable with it. He has not yet started physical therapy. He has lost his handicap placard and requests a replacement. He has discontinued amlodipine and is not currently on any cardiac medications. He monitors his heart rate using his watch.        Current medicines (including reconciliation performed today)  Current  "Medications[1]    Allergies:   Aspirin, Atorvastatin, and Codeine    Social History[2]    ROS:  See HPI    Objective:     Vitals:    06/23/25 1249   BP: 132/80   BP Location: Left arm   Patient Position: Sitting   Pulse: 78   Resp: 16   Temp: 37.4 °C (99.3 °F)   TempSrc: Temporal   SpO2: 95%   Weight: 88 kg (194 lb)   Height: 1.676 m (5' 6\")     Body mass index is 31.31 kg/m².    Physical Exam:  Physical Exam  General Appearance: Normal.  Vital signs: Within normal limits.  HEENT: Within normal limits.  Respiratory: Clear to auscultation, no wheezing, rales or rhonchi.  Skin: Warm and dry, no rash.  Neurological: Normal. Slow gait with cane.      Results  Labs   - Urinalysis: Urinary tract infection    Imaging   - Chest x-ray: Negative for pneumonia   - Echocardiogram: Ejection fraction of 55%, grade 1 diastolic dysfunction, moderate aortic insufficiency, ascending aorta was 4 cm    Diagnostic Testing   - Cognitive evaluation: Issues with short term memory and executive function       Assessment and Plan:   1. Hospital discharge follow-up    2. Aneurysm of ascending aorta without rupture (HCC)    3. Fall, subsequent encounter    4. Acute kidney injury (HCC)      Assessment & Plan  1. Post-hospitalization follow-up: Stable. Hospitalized from 05/28/2025 to 05/30/2025 due to a fall. Recent hospitalization revealed a urinary tract infection (UTI), which was treated with fluids. Echocardiogram indicated an ejection fraction of 55%, grade 1 diastolic dysfunction, and moderate aortic insufficiency. The ascending aorta measured 4 cm. Cognitive evaluation identified short-term memory and executive function issues.  - Recommendations for intermittent supervision throughout the day and assistance with medication management, financial management, appointments, household chores, and cooking.  - Referred to physical therapy.  - Continue home exercises and schedule a physical therapy appointment.  - Repeat ultrasound of the heart " in 6 months to ensure stability.  - Maintain activity level and adhere to fall prevention measures.  - Monitor blood pressure closely and ensure adequate hydration.  - If experiencing weakness again, a urine test will be conducted to rule out an atypical infection.    Follow-up  - Scheduled for a follow-up visit on 09/25/2025.        - Chart and discharge summary were reviewed.   - Hospitalization and results reviewed with patient.   - Medications reviewed including instructions regarding high risk medications, dosing and side effects.  - Recommended Services: No services needed at this time  - Advance directive/POLST on file?  No     Follow-up:Return in about 3 months (around 9/23/2025), or if symptoms worsen or fail to improve, for Lab F/U, Imaging F/U.    Face-to-face transitional care management services with HIGH (today's visit is within days post discharge & LACE+ score 59+) medical decision complexity were provided.     LACE+ Historical Score Over Time (0-28: Low, 29-58: Medium, 59+: High): 66                      [1]   Current Outpatient Medications   Medication Sig Dispense Refill    GLUCOSAMINE SULFATE PO Take 2 Capsules by mouth every day. Indications: supplement      Cyanocobalamin (VITAMIN B12 PO) Take 1,500 mcg by mouth every day. Indications: supplement      LYSINE HCL PO Take 1 Tablet by mouth every day. Premium Nutrition  healthy eyes  Indications: supplement      ZINC GLYCINATE PO Take 1 Tablet by mouth every day. Zinc plus  Indications: supplement      Dextran 70-Hypromellose (ARTIFICIAL TEARS) 0.1-0.3 % Solution Administer 1 Drop into affected eye(s) every day. 1 drop each eye  Indications: dry eye      GARLIC PO Take 1 Capsule by mouth every day. 1575 mg  Indications: supplement      Ascorbic Acid (VITAMIN C) 1000 MG Tab Take 1 Tablet by mouth every day. Indications: supplement      Ginkgo Biloba (GINKOBA PO) Take 1 Tablet by mouth every day. 120 mg  Indications: supplement      Cholecalciferol  (VITAMIN D-3 PO) Take 1 Tablet by mouth every day. 50 mcg  Indications: supplement      CALCIUM PO Take 1 Tablet by mouth every day. calcium bone maker complex -   Indications: supplement      MAGNESIUM PO Take 1 Tablet by mouth every day. 100 mg  Indications: supplement      Red Yeast Rice Extract (RED YEAST RICE PO) Take 1 Tablet by mouth every day. 600 mg  Indications: heart health      Coenzyme Q10 (COQ-10 PO) Take 1 Tablet by mouth every day. 100 mg Ubiquinone  Indications: supplement       No current facility-administered medications for this visit.   [2]   Social History  Tobacco Use    Smoking status: Former     Current packs/day: 0.00     Average packs/day: 1 pack/day for 10.0 years (10.0 ttl pk-yrs)     Types: Cigarettes     Start date:      Quit date:      Years since quittin.4    Smokeless tobacco: Never   Vaping Use    Vaping status: Never Used   Substance Use Topics    Alcohol use: Yes     Comment: 1 drink a month    Drug use: Never

## 2025-06-25 SDOH — SOCIAL STABILITY: SOCIAL NETWORK: HOW OFTEN DO YOU GET TOGETHER WITH FRIENDS OR RELATIVES?: MORE THAN THREE TIMES A WEEK

## 2025-06-25 SDOH — HEALTH STABILITY: PHYSICAL HEALTH
HOW OFTEN DO YOU NEED TO HAVE SOMEONE HELP YOU WHEN YOU READ INSTRUCTIONS, PAMPHLETS, OR OTHER WRITTEN MATERIAL FROM YOUR DOCTOR OR PHARMACY?: RARELY

## 2025-06-25 SDOH — ECONOMIC STABILITY: FOOD INSECURITY: HOW HARD IS IT FOR YOU TO PAY FOR THE VERY BASICS LIKE FOOD, HOUSING, MEDICAL CARE, AND HEATING?: SOMEWHAT HARD

## 2025-06-25 SDOH — ECONOMIC STABILITY: HOUSING INSECURITY: AT ANY TIME IN THE PAST 12 MONTHS, WERE YOU HOMELESS OR LIVING IN A SHELTER (INCLUDING NOW)?: NO

## 2025-06-25 SDOH — HEALTH STABILITY: MENTAL HEALTH: HOW MANY DRINKS CONTAINING ALCOHOL DO YOU HAVE ON A TYPICAL DAY WHEN YOU ARE DRINKING?: PATIENT DOES NOT DRINK

## 2025-06-25 SDOH — SOCIAL STABILITY: SOCIAL INSECURITY
WITHIN THE LAST YEAR, HAVE YOU BEEN RAPED OR FORCED TO HAVE ANY KIND OF SEXUAL ACTIVITY BY YOUR PARTNER OR EX-PARTNER?: NO

## 2025-06-25 SDOH — HEALTH STABILITY: PHYSICAL HEALTH: ON AVERAGE, HOW MANY DAYS PER WEEK DO YOU ENGAGE IN MODERATE TO STRENUOUS EXERCISE (LIKE A BRISK WALK)?: 0 DAYS

## 2025-06-25 SDOH — SOCIAL STABILITY: SOCIAL INSECURITY: ARE YOU MARRIED, WIDOWED, DIVORCED, SEPARATED, NEVER MARRIED, OR LIVING WITH A PARTNER?: WIDOWED

## 2025-06-25 SDOH — ECONOMIC STABILITY: HOUSING INSECURITY: IN THE LAST 12 MONTHS, WAS THERE A TIME WHEN YOU WERE NOT ABLE TO PAY THE MORTGAGE OR RENT ON TIME?: NO

## 2025-06-25 SDOH — HEALTH STABILITY: PHYSICAL HEALTH: ON AVERAGE, HOW MANY MINUTES DO YOU ENGAGE IN EXERCISE AT THIS LEVEL?: 0 MIN

## 2025-06-25 SDOH — ECONOMIC STABILITY: HOUSING INSECURITY: IN THE PAST 12 MONTHS, HOW MANY TIMES HAVE YOU MOVED WHERE YOU WERE LIVING?: 0

## 2025-06-25 SDOH — HEALTH STABILITY: MENTAL HEALTH: HOW OFTEN DO YOU HAVE A DRINK CONTAINING ALCOHOL?: NEVER

## 2025-06-25 SDOH — SOCIAL STABILITY: SOCIAL INSECURITY
WITHIN THE LAST YEAR, HAVE YOU BEEN KICKED, HIT, SLAPPED, OR OTHERWISE PHYSICALLY HURT BY YOUR PARTNER OR EX-PARTNER?: NO

## 2025-06-25 SDOH — SOCIAL STABILITY: SOCIAL NETWORK: IN A TYPICAL WEEK, HOW MANY TIMES DO YOU TALK ON THE PHONE WITH FAMILY, FRIENDS, OR NEIGHBORS?: THREE TIMES A WEEK

## 2025-06-25 SDOH — ECONOMIC STABILITY: FOOD INSECURITY: WITHIN THE PAST 12 MONTHS, THE FOOD YOU BOUGHT JUST DIDN'T LAST AND YOU DIDN'T HAVE MONEY TO GET MORE.: NEVER TRUE

## 2025-06-25 SDOH — ECONOMIC STABILITY: INCOME INSECURITY: IN THE PAST 12 MONTHS HAS THE ELECTRIC, GAS, OIL, OR WATER COMPANY THREATENED TO SHUT OFF SERVICES IN YOUR HOME?: NO

## 2025-06-25 SDOH — ECONOMIC STABILITY: FOOD INSECURITY: WITHIN THE PAST 12 MONTHS, YOU WORRIED THAT YOUR FOOD WOULD RUN OUT BEFORE YOU GOT THE MONEY TO BUY MORE.: NEVER TRUE

## 2025-06-25 SDOH — HEALTH STABILITY: MENTAL HEALTH
DO YOU FEEL STRESS - TENSE, RESTLESS, NERVOUS, OR ANXIOUS, OR UNABLE TO SLEEP AT NIGHT BECAUSE YOUR MIND IS TROUBLED ALL THE TIME - THESE DAYS?: NOT AT ALL

## 2025-06-25 SDOH — SOCIAL STABILITY: SOCIAL NETWORK: HOW OFTEN DO YOU ATTEND MEETINGS OF THE CLUBS OR ORGANIZATIONS YOU BELONG TO?: NEVER

## 2025-06-25 SDOH — SOCIAL STABILITY: SOCIAL INSECURITY: WITHIN THE LAST YEAR, HAVE YOU BEEN AFRAID OF YOUR PARTNER OR EX-PARTNER?: NO

## 2025-06-25 SDOH — HEALTH STABILITY: MENTAL HEALTH: HOW OFTEN DO YOU HAVE SIX OR MORE DRINKS ON ONE OCCASION?: NEVER

## 2025-06-25 SDOH — SOCIAL STABILITY: SOCIAL NETWORK
DO YOU BELONG TO ANY CLUBS OR ORGANIZATIONS SUCH AS CHURCH GROUPS, UNIONS, FRATERNAL OR ATHLETIC GROUPS, OR SCHOOL GROUPS?: NO

## 2025-06-25 SDOH — ECONOMIC STABILITY: TRANSPORTATION INSECURITY: IN THE PAST 12 MONTHS, HAS LACK OF TRANSPORTATION KEPT YOU FROM MEDICAL APPOINTMENTS OR FROM GETTING MEDICATIONS?: NO

## 2025-06-25 SDOH — SOCIAL STABILITY: SOCIAL NETWORK: HOW OFTEN DO YOU ATTEND CHURCH OR RELIGIOUS SERVICES?: 1 TO 4 TIMES PER YEAR

## 2025-06-25 ASSESSMENT — LIFESTYLE VARIABLES
AUDIT-C TOTAL SCORE: 0
SKIP TO QUESTIONS 9-10: 1

## 2025-06-25 ASSESSMENT — ACTIVITIES OF DAILY LIVING (ADL): LACK_OF_TRANSPORTATION: NO

## 2025-06-25 ASSESSMENT — PATIENT HEALTH QUESTIONNAIRE - PHQ9: CLINICAL INTERPRETATION OF PHQ2 SCORE: 0

## 2025-06-25 NOTE — PROGRESS NOTES
INITIAL CARE MANAGEMENT CARE PLAN/ASSESSMENT     Ramesh Manjarrez  is a 75 y.o. male  that meets all criteria to qualify for the PCM program, as addressed below. Patient's preferred language is English and does not require an . Patient has verbalized his full name and  as well as given verbal consent to enroll in the CCM program. Patient has a support system that consists of his son   Patient lives in a single level home with his son that does not consist of stairs. Patient does not have an elevator he can utilize. Patient does not currently have home services (PT/OT/SN) coming to his home. Patient does not have spiritual beliefs that may affect care given to patient. Patient stated will be able to participate Care Coordination without modifications to care..   Patient does not have an advanced directive. Family is aware of their wishes. Patient does have medical equipment at home consisting of a walker. Patient does not have communication barriers.  Patient best learns by listening.  Patient states he does not have any issues with memory.  Patient does have reliable transportation as rides with his son and RTC. Patient is eating a regular low chil. diet at home and is able to obtain enough food to last throughout the month. Patient states he does not exercise regularly.  Patient is able to perform all his/her ADLs and iADLs.  Allergies and medication reviewed with patient.  Patient is adhering to their medication regime as prescribed. Patient has expressed goals of reducing both HTN and HLD.  Except those mentioned and addressed below, patient stated they have no other SDOH needs at this time.    Does patient have a caregiver? No     If yes, permission to discuss care with caregiver? NA    Review: Patient is independent and does not need the help of a caregiver at this time.    Health Status    Medical conditions for program:     HLD  HTN      Medical History and Surgical History:     Past  Medical History[1]    Past Surgical History[2]     Recent Hospital Admissions:     5.28.25-5.30.25 (s/p fall due to weakness)    Allergies:    Allergies[3]     Medication and Self-Management Goals:     Reviewed medications listed below with patient.    Current Medications[4]         Plan of Care:  Patient is adhering to medication regimen as prescribed.  Patient states no issues with side effects and is able to obtain medications at this time.         Physical/Functional/Environmental Status:     Activities of Daily Living:  Bathing: independent   Dressing: independent  Grooming: independent  Mouth Care: independent  Toileting: independent  Climbing a Flight of Stairs: independent    Independent Activities of Daily Living:  Shopping: independent  Cooking: independent  Managing Medications: independent  Using the phone and looking up numbers: independent  Driving or using public transportation: independent  Managing Finances: independent        6/25/2025     3:08 PM   STEADI Fall Risk   STEADI Risk for Falling Score 5   One or more falls in the last year Yes   Advised to use a cane or walker to get around safely Yes   Feels unsteady when walking No   Steadies self on furniture while walking at home No   Worried about falling No   Needs to push with hands when rising from a chair No   Has trouble stepping up onto a curb / using stairs Yes   Often has to rush to the toilet No   Has lost some feeling in feet No   Takes medicine that makes him/her feel lightheaded or more tired than usual No   Takes medicine to sleep or improve mood No   Often feels sad or depressed No         Plan of Care:  Patient denies any issues with physical, functional, and environmental status  at this time    Social Determinants of Health       Financial Status:      Financial Resource Strain: Medium Risk (6/25/2025)    Overall Financial Resource Strain (CARDIA)     Difficulty of Paying Living Expenses: Somewhat hard         Plan of Care:   Patient denies any issues with finances at this time  Referred to CHW/SW:  NA       Transportation Status:      Transportation Needs: No Transportation Needs (6/25/2025)    PRAPARE - Transportation     Lack of Transportation (Medical): No     Lack of Transportation (Non-Medical): No        Plan of Care:  Patient denies any issues with transportation status at this time    Referred to CHW/SW:  NA      Food Insecurity:      Food Insecurity: No Food Insecurity (6/25/2025)    Hunger Vital Sign     Worried About Running Out of Food in the Last Year: Never true     Ran Out of Food in the Last Year: Never true        Plan of Care:  Patient denies any issues with food insecurity at this time    Referred to CHW/SW:  NA       Housing Status:     Housing Stability: Low Risk  (6/25/2025)    Housing Stability Vital Sign     Unable to Pay for Housing in the Last Year: No     Number of Times Moved in the Last Year: 0     Homeless in the Last Year: No        Are you worried that in the next 2 months, you may not have stable housing that you own, rent or stay in as part of a household? No      Plan of Care:  Patient denies any issues with housing status at this time    Referred to CHW/SW:  NA      Social Connections:     Social Connections: Moderately Isolated (6/25/2025)    Social Connection and Isolation Panel [NHANES]     Frequency of Communication with Friends and Family: Three times a week     Frequency of Social Gatherings with Friends and Family: More than three times a week     Attends Faith Services: 1 to 4 times per year     Active Member of Clubs or Organizations: No     Attends Club or Organization Meetings: Never     Marital Status:           Plan of Care:  Patient denies any issues with social connections at this time    Referred to CHW/SW:  NA      Mental/Behavioral/Psychosocial Status:        5/23/2025     8:00 AM 5/28/2025    10:22 PM 6/10/2025     9:44 AM   Depression Screen (PHQ-2/PHQ-9)   PHQ-2 Total  Score  0    PHQ-2 Total Score 0  0       Interpretation of PHQ-9 Total Score   Score Severity   1-4 No Depression   5-9 Mild Depression   10-14 Moderate Depression   15-19 Moderately Severe Depression   20-27 Severe Depression       Mental Health: Patient denies any concerns with mental health.    Substance Abuse: Patient denies any issues with substance abuse    Plan of Care: Patient denies any issues with mental health at this time    Review of Benefits    Member's insurance benefits provide coverage for their medication, PCP/specialists, and CCM services; benefits are adequate for care management plan.    Phone Number and Website provided for questions:    Social IQ (Social Influence Quotient):  [phone]   [email or web address]    Advance Care Planning    Do you have an Advance Directive?  No    (If no, a copy can be provided for patient.)    Would you like an Advance Directive Packet sent to you? No    Review:  Patient educated on the  importance of Advance Care Planning     Chronic Care Management Care Plan         Care Plans       Chronic Care Management (CMS)    Met: 0 of 7 Met: 0 of 7      No Outcome (7)       Patient will demonstrate an understanding of hyperlipidemia and its management. Long Term goal 3-9 mo. (Knowledge deficit of hyperlipidemia)  Disciplines:  Nurse, Interdisciplinary  Expected end:  -     Patient will demonstrate and maintain improvement in lab values. Long Term goal 3-9 mo. (Elevated lipids in the blood)  Disciplines:  Nurse, Interdisciplinary  Expected end:  -     Demonstrate Knowledge of Hypertension. Short Term goal. 1-3 mo.  (Knowledge deficit of hypertension)  Disciplines:  Interdisciplinary  Expected end:  -     Demonstrate factors that can contribute to high blood pressure. Long Term goal 3-9 mo. (Knowledge deficit of factors contributing to hypertension)  Disciplines:  Interdisciplinary  Expected end:  -     Identify which modifiable health habits can be modifed. Long Term goal 3-9  mi. (Recognize current health habits that may contribute to hypertension)  Disciplines:  Interdisciplinary  Expected end:  -     Identify barriers to managing blood pressure. Short Term goal 1-3 mo. (Patient barriers to managing high blood pressure)  Disciplines:  Interdisciplinary  Expected end:  -     Demonstrate the elements of self-monitoring blood pressure. Short Term goal 1-3 mo. (Knowledge deficit of self-monitoring blood pressure)  Disciplines:  Interdisciplinary  Expected end:  -                                            Discussion of Self Management of Care:       Priority 1.  Patient stated a need to keep blood pressure under control and WNL to improve their quality of life.  The patient will try to take home BP readings on a daily basis and keep records/log of results for providers to evaluate.  The patient's progress will be evidenced by his reports of BP readings to this PCM RN or his PCP/providers during PCM cert program short term goal of 1-3 months.  Patient has agreed to take home BP readings within 1 month of PCM cert. Program.        Priority 2.  Patient stated a need to reduce cholesterol intake and subsequently reduce cholesterol lab values by consuming foods that help reduce cholesterol in general as well as LDL cholesterol which was elevated. (Ie: Red yeast rice (which Pt is taking and PCP aware), avocados, and olive oil) to improve their quality of life.  The patient will try to adhere to a diet low in cholesterol and higher in some of the previous mentioned cholesterol/LDL lowering foods as evidenced by lab testing (Lipid panel) that has been ordered. The patient's progress of consuming lower cholesterol foods and avoiding foods high in cholesterol, will be measured by his Lipid panel results indicating a cholesterol and LDL reduction within the PCM cert program. Patient has agreed to reduce high cholesterol foods and increase chol lowering foods/supplements during this long term goal  within his PCM cert program.    Barriers to Goals: none identified, pt is motivated towards success of goals.    Resources Provided:  n/a    Patient's Understanding and Agreement:  Pt verbalized understanding of above agreement to this PCM RN during PCM monthly call and has agreed to try taking previous described steps to attain goals.     Next Steps:     Follow-Up Plan:  Next PCM f/u call will be in July of 2025      Appointments:  Pt has appointment with his PCP/Dr. Koch on 9.26.25 @10:20am- Pt was reminded of this appointment and of upciming labs to be done prior.      Contact Information:  Call [phone] with any questions or concerns.          [1]   Past Medical History:  Diagnosis Date    Acute encephalopathy 10/15/2023    Acute hypoxic respiratory failure (HCC) 10/15/2023    Acute kidney injury (HCC) 10/15/2023    CKD    Anemia     Anesthesia     Arrhythmia     Hx of irregular rhythm; followed by cardiologist in [place], [place] - unable to recall name of MD    Arthritis     Osteo    Bilateral atelectasis 8/23/2023    Bowel habit changes     Constipation    BPH with elevated PSA     Cataract     Bilat IOL    Dental disorder     loose cap in mouth    Enterococcus faecalis UTI (urinary tract infection) 10/16/2023    Heart burn     Heart valve disease     AAA per problem list, patient denies    High cholesterol     Hyperbilirubinemia 10/15/2023    Hypertension     Hypokalemia 10/17/2023    Hypophosphatemia 10/15/2023    Indigestion     Pain     pain in shoulder and arm    Polyneuropathy in other diseases classified elsewhere ([facility]) 05/19/2015    Formatting of this note might be different from the original.   *DUE TO B12 DEF   10/24/20 eleuterio note: Neurological:        Comments: Numbness of feet.      Last Assessment & Plan:    Formatting of this note might be different from the original.   Stable    Defers meds    PONV (postoperative nausea and vomiting)     Recurrent major depressive disorder, in partial  "remission ([facility]) 01/01/1960    Formatting of this note might be different from the original.         Last Assessment & Plan:    Formatting of this note might be different from the original.   Defers meds    Senile purpura ([facility]) 10/10/2017    Formatting of this note might be different from the original.         Last Assessment & Plan:    Formatting of this note might be different from the original.   avoid trauma and blood thinners.    Sepsis ([facility]) 10/15/2023    Tinnitus, bilateral     Trauma 8/22/2023    Urinary incontinence    [2]   Past Surgical History:  Procedure Laterality Date    SHOULDER ARTHROPLASTY TOTAL REVERSED Left 6/10/2024    Procedure: LEFT ARTHROPLASTY, SHOULDER, TOTAL, REVERSE;  Surgeon: Jamison Castillo M.D.;  Location: SURGERY [facility];  Service: Orthopedics    CATARACT EXTRACTION WITH IOL Bilateral     INGUINAL HERNIA REPAIR Left     OTHER ORTHOPEDIC SURGERY      screw in right shoulder    PROSTATE NEEDLE BIOPSY      x4 reports all benign   [3]   Allergies  Allergen Reactions    Aspirin Unspecified     Worsening tinnitus  Other reaction(s): Other (See Comments)  TINNITUS      Atorvastatin Myalgia    Codeine Unspecified     Other reaction(s): Unknown  \"Made me crazy\"   [4]   Current Outpatient Medications:     GLUCOSAMINE SULFATE PO, Take 2 Capsules by mouth every day. Indications: supplement, Disp: , Rfl:     Cyanocobalamin (VITAMIN B12 PO), Take 1,500 mcg by mouth every day. Indications: supplement, Disp: , Rfl:     ZINC GLYCINATE PO, Take 1 Tablet by mouth every day. Zinc plus  Indications: supplement, Disp: , Rfl:     Dextran 70-Hypromellose (ARTIFICIAL TEARS) 0.1-0.3 % Solution, Administer 1 Drop into affected eye(s) every day. 1 drop each eye  Indications: dry eye, Disp: , Rfl:     GARLIC PO, Take 1 Capsule by mouth every day. 1575 mg  Indications: supplement, Disp: , Rfl:     Ascorbic Acid (VITAMIN C) 1000 MG Tab, Take 1 Tablet by mouth every day. Indications: supplement, Disp: , Rfl: "     Ginkgo Biloba (GINKOBA PO), Take 1 Tablet by mouth every day. 120 mg  Indications: supplement, Disp: , Rfl:     Cholecalciferol (VITAMIN D-3 PO), Take 1 Tablet by mouth every day. 50 mcg  Indications: supplement, Disp: , Rfl:     CALCIUM PO, Take 1 Tablet by mouth every day. calcium bone maker complex -   Indications: supplement, Disp: , Rfl:     MAGNESIUM PO, Take 1 Tablet by mouth every day. 100 mg  Indications: supplement, Disp: , Rfl:     Red Yeast Rice Extract (RED YEAST RICE PO), Take 1 Tablet by mouth every day. 600 mg  Indications: heart health, Disp: , Rfl:     Coenzyme Q10 (COQ-10 PO), Take 1 Tablet by mouth every day. 100 mg Ubiquinone  Indications: supplement, Disp: , Rfl:     LYSINE HCL PO, Take 1 Tablet by mouth every day. [employer] healthy eyes  Indications: supplement (Patient not taking: Reported on 6/25/2025), Disp: , Rfl:

## 2025-07-23 ENCOUNTER — PATIENT OUTREACH (OUTPATIENT)
Dept: HEALTH INFORMATION MANAGEMENT | Facility: OTHER | Age: 75
End: 2025-07-23
Payer: MEDICARE

## 2025-07-23 DIAGNOSIS — E78.2 MIXED HYPERLIPIDEMIA: Primary | ICD-10-CM

## 2025-07-23 NOTE — PROGRESS NOTES
Community Health Worker Follow-Up    Reason for outreach: CHW called the pt to follow up with him.    CHW Interventions: This CHW called the pt to follow up and see if he needed anything. Pt stated he was good and had no needs at this time.     Specific Resources Provided:  Housing/Shelter: n/a  Transportation: n/a  Food: n/a  Financial: n/a  Social Supports: n/a  Other: n/a    Plan: CHW will follow up as needed

## 2025-07-25 ENCOUNTER — PATIENT OUTREACH (OUTPATIENT)
Dept: HEALTH INFORMATION MANAGEMENT | Facility: OTHER | Age: 75
End: 2025-07-25
Payer: MEDICARE

## 2025-07-25 DIAGNOSIS — M25.512 CHRONIC LEFT SHOULDER PAIN: Primary | ICD-10-CM

## 2025-07-25 DIAGNOSIS — N18.2 HYPERTENSIVE KIDNEY DISEASE WITH CHRONIC KIDNEY DISEASE STAGE II: ICD-10-CM

## 2025-07-25 DIAGNOSIS — H52.203 HYPEROPIC ASTIGMATISM OF BOTH EYES: ICD-10-CM

## 2025-07-25 DIAGNOSIS — I12.9 HYPERTENSIVE KIDNEY DISEASE WITH CHRONIC KIDNEY DISEASE STAGE II: ICD-10-CM

## 2025-07-25 DIAGNOSIS — G89.29 CHRONIC LEFT SHOULDER PAIN: Primary | ICD-10-CM

## 2025-07-25 DIAGNOSIS — E78.2 MIXED HYPERLIPIDEMIA: Primary | ICD-10-CM

## 2025-07-25 NOTE — PROGRESS NOTES
Reason for Follow-Up:    HLD, HTN management    Current Health Status:    Pt call centered around pt c/o Left shoulder pain and having little motivation to do things.    Medical Updates:  Left shoulder pain on previously operated shoulder 1 yr. Ago.     Medication Updates:  No changes in pt's medications.     Symptoms:  Pt has no c/o h/a, SOB, or respiratory distress. Pt c/o Left shoulder pain/stiffness     Plan of Care Goals and Progress:    Goal 1. HLD     Progress:  Pt continues to stay on low chol, low Na+ diet.      Barriers:  Pt call centered on pt's shoulder pain and desire to have PT on his shoulder.     Interventions:  This RN informed Dr. Koch (via message in EMR) of pt's Left shoulder pain/stiffness and desire to start PT on it again.      Education:  Pt call centered on pt's shoulder pain and desire to have PT on his shoulder.    Goal 2. HTN     Progress:  Pt call centered on pt's shoulder pain and desire to have PT on his shoulder.      Barriers:  Pt call centered on pt's shoulder pain and desire to have PT on his shoulder.     Interventions:  Pt call centered on pt's shoulder pain and desire to have PT on his shoulder.     Education:  Pt call centered on pt's shoulder pain and desire to have PT on his shoulder.      Patient's Concerns and Feedback (Self Management of Care):     Pt call centered on pt's shoulder pain and desire to have PT on his shoulder.    Next Steps:     Follow-Up Plan:  Next PCM monthly f/u call hemalatha be in August of 2025      Appointments:  next appointment with PCP/Dr. Koch is 9.25.25     Contact Information:  Call [phone] with any questions or concerns.

## 2025-07-25 NOTE — CARE PLAN
Problem: Knowledge deficit of hyperlipidemia  Goal: Patient will demonstrate an understanding of hyperlipidemia and its management. Long Term goal 3-9 mo.  Outcome: Progressing  Note: Pt states he's eating a healthy diet. Ie: limiting fats, sugars, and sodium.  Intervention: Teach about heart-healthy dietary patterns, such as the DASH or Mediterranean diet and the importance of reducing saturated fats, trans fats, and cholesterol  Intervention: Educate patient on role of physical activity and how it can improve lipid profiles     Problem: Elevated lipids in the blood  Goal: Patient will demonstrate and maintain improvement in lab values. Long Term goal 3-9 mo.  Outcome: Progressing  Note: Pt states diet is low in satruated fat & cholesterol and that he is eating less red meats.  Intervention: Monitor lipid profile levels to evaluate the effectiveness of interventions     Problem: Knowledge deficit of factors contributing to hypertension  Goal: Demonstrate factors that can contribute to high blood pressure. Long Term goal 3-9 mo.  Outcome: Progressing  Note: Patient has verbalized understanding of factors contributing to high blood pressure. Discussed how increased physical activity can benefit pt's well being. Pt has c/o having Left shoulder pain (s/p shoulder sx last year).   Intervention: Educate patient on role of physical activity  Description: Refer to community wellness programs     Problem: Recognize current health habits that may contribute to hypertension  Goal: Identify which modifiable health habits can be modifed. Long Term goal 3-9 mi.  Outcome: Progressing  Note: Patient has verbalized willingness to change immobility health habits and try to do chair exercises which were mailed to him.  Intervention: Provide education and support about changing health habits  Intervention: Evaluate and identify modifiable health habits and readiness     Problem: Patient barriers to managing high blood  pressure  Goal: Identify barriers to managing blood pressure. Short Term goal 1-3 mo.  Outcome: Progressing  Note: Patient has vocalized commitment to lessening the barriers of not taking home BP readings.  Intervention: Identify patient barriers to managing high blood pressure  Intervention: Identify with patient what the barriers are to self-management of blood pressure     Problem: Knowledge deficit of self-monitoring blood pressure  Goal: Demonstrate the elements of self-monitoring blood pressure. Short Term goal 1-3 mo.  Outcome: Progressing  Note:   -Patient has verbalized commitment to self-monitoring blood pressure at least once a week.    -Patient will log home blood pressures and bring the log to medical provider appointments.    Intervention: Educate on importance of self-monitoring blood pressure  Intervention: Educate on importance of keeping a home blood pressure log     Problem: Knowledge deficit of hypertension  Goal: Demonstrate Knowledge of Hypertension. Short Term goal. 1-3 mo.   Note: Pt verbalized awareness of the risks of elevated BP such as heart issues, stroke, h/a, and blood vessel issue.   Intervention: Define hypertension in lay terms  Intervention: Educate patient on harmful effects of high blood pressure

## 2025-08-08 ENCOUNTER — PATIENT OUTREACH (OUTPATIENT)
Dept: HEALTH INFORMATION MANAGEMENT | Facility: OTHER | Age: 75
End: 2025-08-08
Payer: MEDICARE

## 2025-08-08 DIAGNOSIS — I12.9 HYPERTENSIVE KIDNEY DISEASE WITH CHRONIC KIDNEY DISEASE STAGE II: ICD-10-CM

## 2025-08-08 DIAGNOSIS — E78.2 MIXED HYPERLIPIDEMIA: Primary | ICD-10-CM

## 2025-08-08 DIAGNOSIS — N18.2 HYPERTENSIVE KIDNEY DISEASE WITH CHRONIC KIDNEY DISEASE STAGE II: ICD-10-CM

## (undated) DEVICE — DRAPE IOBAN LARGE 2 INCISE FILM (5EA/CA)

## (undated) DEVICE — SODIUM CHL. IRRIGATION 0.9% 3000ML (4EA/CA 65CA/PF)

## (undated) DEVICE — CANISTER SUCTION RIGID RED 1500CC (40EA/CA)

## (undated) DEVICE — ELECTRODE DUAL RETURN W/ CORD - (50/PK)

## (undated) DEVICE — HUMID-VENT HEAT AND MOISTURE EXCHANGE- (50/BX)

## (undated) DEVICE — SET EXTENSION WITH 2 PORTS (48EA/CA) ***PART #2C8610 IS A SUBSTITUTE*****

## (undated) DEVICE — GUIDEWIRE

## (undated) DEVICE — BLADE 90X18X1.27MM SAW SAGITTAL

## (undated) DEVICE — COVER LIGHT HANDLE FLEXIBLE - SOFT (2EA/PK 80PK/CA)

## (undated) DEVICE — STOCKINETTE IMPERVIOUS 12X48 - STERILELF (10/CA)"

## (undated) DEVICE — GLOVE BIOGEL SZ 8 SURGICAL PF LTX - (50PR/BX 4BX/CA)

## (undated) DEVICE — SUCTION INSTRUMENT YANKAUER BULBOUS TIP W/O VENT (50EA/CA)

## (undated) DEVICE — SENSOR OXIMETER ADULT SPO2 RD SET (20EA/BX)

## (undated) DEVICE — LACTATED RINGERS INJ 1000 ML - (14EA/CA 60CA/PF)

## (undated) DEVICE — SUTURE 2-0 VICRYL PLUS CT-1 36 (36PK/BX)"

## (undated) DEVICE — SUTURE 5 ETHIBOND V-37 (12PK/BX)

## (undated) DEVICE — DRAPE U SPLIT IMP 54 X 76 - (24/CA)

## (undated) DEVICE — HANDPIECE 10FT INTPLS SCT PLS IRRIGATION HAND CONTROL SET (6/PK)

## (undated) DEVICE — TUBING CLEARLINK DUO-VENT - C-FLO (48EA/CA)

## (undated) DEVICE — PAD PREP 24 X 48 CUFFED - (100/CA)

## (undated) DEVICE — SUCTION TIP STRAIGHT ARGYLE - 50EA/CA

## (undated) DEVICE — PACK MAJOR ORTHO - (2EA/CA)

## (undated) DEVICE — SODIUM CHL IRRIGATION 0.9% 1000ML (12EA/CA)

## (undated) DEVICE — SUTURE 0 VICRYL PLUS CT-1 - 36 INCH (36/BX)

## (undated) DEVICE — TIP INTPLS HFLO ML ORFC BTRY - (12/CS)  FOR SURGILAV

## (undated) DEVICE — DRAPE SURGICAL U 77X120 - (10/CA)

## (undated) DEVICE — DRILL BIT